# Patient Record
Sex: FEMALE | Race: WHITE | Employment: OTHER | URBAN - METROPOLITAN AREA
[De-identification: names, ages, dates, MRNs, and addresses within clinical notes are randomized per-mention and may not be internally consistent; named-entity substitution may affect disease eponyms.]

---

## 2020-09-01 VITALS
BODY MASS INDEX: 20.57 KG/M2 | HEIGHT: 66 IN | DIASTOLIC BLOOD PRESSURE: 64 MMHG | WEIGHT: 128 LBS | HEART RATE: 58 BPM | SYSTOLIC BLOOD PRESSURE: 113 MMHG

## 2020-09-01 DIAGNOSIS — I10 ESSENTIAL HYPERTENSION: ICD-10-CM

## 2020-09-01 DIAGNOSIS — Z95.0 CARDIAC PACEMAKER IN SITU: ICD-10-CM

## 2020-09-01 PROBLEM — K59.09 CHRONIC CONSTIPATION: Status: ACTIVE | Noted: 2018-10-22

## 2020-09-01 PROBLEM — I27.20 PULMONARY HYPERTENSION (HCC): Status: ACTIVE | Noted: 2018-09-04

## 2020-09-01 PROBLEM — I25.10 CORONARY ARTERIOSCLEROSIS: Status: ACTIVE | Noted: 2018-08-30

## 2020-09-01 PROBLEM — K57.90 DIVERTICULAR DISEASE: Status: ACTIVE | Noted: 2018-09-04

## 2020-09-01 RX ORDER — CARVEDILOL 3.12 MG/1
3.12 TABLET ORAL 2 TIMES DAILY WITH MEALS
COMMUNITY

## 2020-09-01 RX ORDER — FUROSEMIDE 20 MG/1
40 TABLET ORAL DAILY
COMMUNITY
End: 2021-10-06

## 2020-09-01 RX ORDER — AMLODIPINE BESYLATE 10 MG/1
TABLET ORAL DAILY
COMMUNITY
End: 2020-09-17

## 2020-09-17 ENCOUNTER — OFFICE VISIT (OUTPATIENT)
Dept: INTERNAL MEDICINE CLINIC | Age: 85
End: 2020-09-17
Payer: MEDICARE

## 2020-09-17 VITALS
OXYGEN SATURATION: 96 % | HEART RATE: 61 BPM | SYSTOLIC BLOOD PRESSURE: 116 MMHG | WEIGHT: 123.8 LBS | BODY MASS INDEX: 21.93 KG/M2 | DIASTOLIC BLOOD PRESSURE: 67 MMHG | TEMPERATURE: 97.6 F | RESPIRATION RATE: 14 BRPM | HEIGHT: 63 IN

## 2020-09-17 DIAGNOSIS — Z71.89 ADVANCED CARE PLANNING/COUNSELING DISCUSSION: ICD-10-CM

## 2020-09-17 DIAGNOSIS — K59.09 CHRONIC CONSTIPATION: ICD-10-CM

## 2020-09-17 DIAGNOSIS — I10 ESSENTIAL HYPERTENSION: Primary | ICD-10-CM

## 2020-09-17 DIAGNOSIS — E78.2 MIXED HYPERLIPIDEMIA: ICD-10-CM

## 2020-09-17 PROCEDURE — 3288F FALL RISK ASSESSMENT DOCD: CPT | Performed by: INTERNAL MEDICINE

## 2020-09-17 PROCEDURE — G8420 CALC BMI NORM PARAMETERS: HCPCS | Performed by: INTERNAL MEDICINE

## 2020-09-17 PROCEDURE — G8427 DOCREV CUR MEDS BY ELIG CLIN: HCPCS | Performed by: INTERNAL MEDICINE

## 2020-09-17 PROCEDURE — 1100F PTFALLS ASSESS-DOCD GE2>/YR: CPT | Performed by: INTERNAL MEDICINE

## 2020-09-17 PROCEDURE — G0444 DEPRESSION SCREEN ANNUAL: HCPCS | Performed by: INTERNAL MEDICINE

## 2020-09-17 PROCEDURE — G8510 SCR DEP NEG, NO PLAN REQD: HCPCS | Performed by: INTERNAL MEDICINE

## 2020-09-17 PROCEDURE — 99214 OFFICE O/P EST MOD 30 MIN: CPT | Performed by: INTERNAL MEDICINE

## 2020-09-17 PROCEDURE — 1090F PRES/ABSN URINE INCON ASSESS: CPT | Performed by: INTERNAL MEDICINE

## 2020-09-17 PROCEDURE — G8536 NO DOC ELDER MAL SCRN: HCPCS | Performed by: INTERNAL MEDICINE

## 2020-09-17 RX ORDER — PROMETHAZINE HYDROCHLORIDE 25 MG/1
25 TABLET ORAL
COMMUNITY
End: 2020-12-02 | Stop reason: SDUPTHER

## 2020-09-17 RX ORDER — LANOLIN ALCOHOL/MO/W.PET/CERES
1 CREAM (GRAM) TOPICAL
COMMUNITY
End: 2021-11-12

## 2020-09-17 RX ORDER — AMLODIPINE BESYLATE 5 MG/1
5 TABLET ORAL DAILY
COMMUNITY
Start: 2020-06-03 | End: 2021-01-25 | Stop reason: SDUPTHER

## 2020-09-17 RX ORDER — LANOLIN ALCOHOL/MO/W.PET/CERES
500 CREAM (GRAM) TOPICAL DAILY
COMMUNITY

## 2020-09-17 RX ORDER — BISMUTH SUBSALICYLATE 262 MG
1 TABLET,CHEWABLE ORAL DAILY
COMMUNITY

## 2020-09-17 RX ORDER — POLYETHYLENE GLYCOL 3350 17 G/17G
17 POWDER, FOR SOLUTION ORAL
COMMUNITY
End: 2021-11-12

## 2020-09-17 RX ORDER — FAMOTIDINE 20 MG/1
40 TABLET, FILM COATED ORAL 2 TIMES DAILY
COMMUNITY
Start: 2019-12-20 | End: 2021-01-25

## 2020-09-17 RX ORDER — SIMVASTATIN 20 MG/1
20 TABLET, FILM COATED ORAL
COMMUNITY
Start: 2020-06-03

## 2020-09-17 NOTE — PROGRESS NOTES
Omer Boone presents today for   Chief Complaint   Patient presents with    Physical    Hypotension     lightheaded       Depression Screening:  3 most recent PHQ Screens 9/17/2020   Little interest or pleasure in doing things Not at all   Feeling down, depressed, irritable, or hopeless Not at all   Total Score PHQ 2 0       Learning Assessment:  Learning Assessment 9/17/2020   PRIMARY LEARNER Patient   HIGHEST LEVEL OF EDUCATION - PRIMARY LEARNER  GRADUATED HIGH SCHOOL OR GED   PRIMARY LANGUAGE ENGLISH   LEARNER PREFERENCE PRIMARY READING   ANSWERED BY patient   RELATIONSHIP SELF       Abuse Screening:  Abuse Screening Questionnaire 9/17/2020   Do you ever feel afraid of your partner? N   Are you in a relationship with someone who physically or mentally threatens you? N   Is it safe for you to go home? Y       Fall Risk  Fall Risk Assessment, last 12 mths 9/17/2020   Able to walk? Yes   Fall in past 12 months? Yes   Fall with injury? No       ADL  ADL Assessment 9/17/2020   Feeding yourself No Help Needed   Getting from bed to chair No Help Needed   Getting dressed No Help Needed   Bathing or showering No Help Needed   Walk across the room (includes cane/walker) No Help Needed   Using the telphone No Help Needed   Taking your medications No Help Needed   Preparing meals No Help Needed   Managing money (expenses/bills) No Help Needed   Moderately strenuous housework (laundry) No Help Needed   Shopping for personal items (toiletries/medicines) No Help Needed   Shopping for groceries No Help Needed   Driving No Help Needed   Climbing a flight of stairs No Help Needed   Getting to places beyond walking distances No Help Needed         Health Maintenance reviewed and discussed and ordered per Provider.     Health Maintenance Due   Topic Date Due    Lipid Screen  07/31/1944    DTaP/Tdap/Td series (1 - Tdap) 07/31/1955    Shingrix Vaccine Age 50> (1 of 2) 07/31/1984    GLAUCOMA SCREENING Q2Y  07/31/1999    Bone Densitometry (Dexa) Screening  07/31/1999    Flu Vaccine (1) 09/01/2020   . Coordination of Care:  1. Have you been to the ER, urgent care clinic since your last visit? Hospitalized since your last visit? 8/28/20 costipation    2. Have you seen or consulted any other health care providers outside of the [address] [place] [address] since your last visit? Include any pap smears or colon screening. 9/7/20 right hand swelling.

## 2020-09-17 NOTE — PROGRESS NOTES
1. Essential hypertension  Pressures well controlled here. Will check a metabolic panel.  - METABOLIC PANEL, COMPREHENSIVE; Future    2. Chronic constipation  He is now having diarrhea from taking MiraLAX daily. I told her to cut her dose down by 50%. If she still has loose stools after that she is did change MiraLAX to every other day. 3. Mixed hyperlipidemia  Control unknown we will check a lipid panel continue statin  - LIPID PANEL; Future    4. Advanced care planning/counseling discussion  We spent about 8 minutes discussing advanced care planning. She is a DO NOT RESUSCITATE DO NOT INTUBATE. Her power of  is her daughter which is been documented in the ACP note  - DO NOT RESUSCITATE    5. Heart Failure  -Chronic condition and managed by . me see cardiology. Currently she appears euvolemic. She has chronic lower extremity edema. She did not take her Lasix dose this morning because she was coming to see me and did not want to have to use the bathroom. Patient was instructed to restart her Lasix immediately    Total time spent during this encounter was greater than 25 minutes more than 50% spent in counseling and coordination of care. This includes end-of-life discussion review of her medication lists and her past medical history and physical exam      Chief Complaint   Patient presents with    Physical    Hypotension     lightheaded        HPI   This is a delightful 44-year-old female with a history of cardiomyopathy and chronic congestive heart failure status post pacemaker placement. She has not seen a primary care doctor in quite some time but does have a cardiologist who is been managing her heart failure and her statin therapy. She reports she has been doing well but did have a visit to the hospital back in August.  She reports she has been doing well and taking her medications as prescribed. She has no chest pain shortness of breath no nausea vomiting or diarrhea.   She reports she does require home oxygen at night when she sleeps but otherwise can get around without oxygen. She reports she is wearing a mask pretty much at all times and otherwise is being very careful to avoid potential infection with COVID-19  Patient Active Problem List   Diagnosis Code    Allergic rhinitis J30.9    Cardiac pacemaker in situ Z95.0    Chronic constipation K59.09    Chronic obstructive lung disease ([facility] J44.9    Congestive heart failure (CHF) ([facility]) I50.9    Coronary arteriosclerosis I25.10    Diverticular disease K57.90    Essential hypertension I10    Gastroesophageal reflux disease K21.9    Hyperlipidemia E78.5    Myocardial infarction ([facility] I21.9    Osteopenia M85.80    Pulmonary hypertension ([facility]) I27.20        Current Outpatient Medications on File Prior to Visit   Medication Sig Dispense Refill    apixaban (ELIQUIS) 2.5 mg tablet Take 2.5 mg by mouth two (2) times a day.  famotidine (PEPCID) 20 mg tablet Take 40 mg by mouth two (2) times a day.  simvastatin (ZOCOR) 20 mg tablet Take 20 mg by mouth nightly.  amLODIPine (NORVASC) 5 mg tablet Take 5 mg by mouth daily.  linaCLOtide (LINZESS) 72 mcg cap capsule Take  by mouth Daily (before breakfast).  cyanocobalamin (VITAMIN B12) 500 mcg tablet Take 500 mcg by mouth daily.  calcium citrate-vitamin d3 (CITRACAL+D) 315 mg-5 mcg (200 unit) tab Take 1 Tab by mouth daily (with breakfast).  Omega-3 Fatty Acids 60- mg cpDR Take  by mouth.  multivitamin (ONE A DAY) tablet Take 1 Tab by mouth daily.  polyethylene glycol (MIRALAX) 17 gram/dose powder Take 17 g by mouth daily.  promethazine (PHENERGAN) 25 mg tablet Take 25 mg by mouth every six (6) hours as needed.  furosemide (LASIX) 20 mg tablet Take 20 mg by mouth daily.  carvediloL (COREG) 6.25 mg tablet Take 3.125 mg by mouth two (2) times daily (with meals).        No current facility-administered medications on file prior to visit.         ROS  - GEN: no weight gain/loss, no fevers or chills  - HEENT: no vision changes, no tinnitus, no sore throat  - CV: no cp, palpitations + edema  - RESP: no sob, cough  - ABD: no n/v/d, no blood in stool  - : no dysuria or changes in freq. - SKIN: no rashes, ulcers  - Neuro: no resting tremors, parasthesia in extremities, no headaches  - MS: No weakness in extremities, no gait abnormalities  - Psych: negative for depression or anxiety      Visit Vitals  /67 (BP 1 Location: Left arm, BP Patient Position: Sitting)   Pulse 61   Temp 97.6 °F (36.4 °C) (Temporal)   Resp 14   Ht 5' 3\" (1.6 m)   Wt 123 lb 12.8 oz (56.2 kg)   SpO2 96%   BMI 21.93 kg/m²           Physical Exam  Constitutional:       Appearance: Normal appearance. Normal weight. NAD and pleasant  HENT:      Head: Normocephalic. Nose: Nose normal.      Mouth/Throat:      Mouth: Mucous membranes are moist. Throat not inflammed  Eyes:      Extraocular Movements: Extraocular movements intact. Conjunctiva/sclera: Conjunctivae normal. Sclera anicteric     Pupils: Pupils are equal, round, and reactive to light. Cardiovascular:      Rate and Rhythm: some ectopy noted, pacer/defib in place on left. Pulses: Normal pulses. Pulmonary:      Effort: No respiratory distress. Breath sounds: CTAB and No stridor. No rhonchi. Abdominal:      General: There is no distension. NT, ND  Neurological:      Mental Status: patient is alert and oriented times 3.  No resting tremor, normal gait     Cranial Nerves: cranial nerves grossly intact  Muskuloskeletal     Full ROM in extremities     Normal gait  Skin     Dry without lesions on examined areas, warm to the touch       Deferred  Psychiatry     Calm, normal affect, interacting normally

## 2020-10-26 ENCOUNTER — HOSPITAL ENCOUNTER (OUTPATIENT)
Dept: LAB | Age: 85
Discharge: HOME | End: 2020-10-26
Payer: MEDICARE

## 2020-10-26 DIAGNOSIS — I10 ESSENTIAL HYPERTENSION: ICD-10-CM

## 2020-10-26 DIAGNOSIS — E78.2 MIXED HYPERLIPIDEMIA: ICD-10-CM

## 2020-10-26 LAB
ALBUMIN SERPL-MCNC: 4.4 G/DL (ref 3.5–4.7)
ALBUMIN/GLOB SERPL: 1.3 {RATIO}
ALP SERPL-CCNC: 66 U/L (ref 38–126)
ALT SERPL-CCNC: 16 U/L (ref 3–52)
ANION GAP SERPL CALC-SCNC: 10 MMOL/L
AST SERPL W P-5'-P-CCNC: 24 U/L (ref 14–74)
BILIRUB SERPL-MCNC: 0.7 MG/DL (ref 0.2–1)
BUN SERPL-MCNC: 14 MG/DL (ref 9–21)
BUN/CREAT SERPL: 20
CA-I BLD-MCNC: 9.9 MG/DL (ref 8.5–10.5)
CHLORIDE SERPL-SCNC: 107 MMOL/L (ref 94–111)
CO2 SERPL-SCNC: 26 MMOL/L (ref 21–33)
CREAT SERPL-MCNC: 0.7 MG/DL (ref 0.7–1.2)
GLOBULIN SER CALC-MCNC: 3.5 G/DL
GLUCOSE SERPL-MCNC: 84 MG/DL (ref 70–110)
POTASSIUM SERPL-SCNC: 3.9 MMOL/L (ref 3.2–5.1)
PROT SERPL-MCNC: 7.9 G/DL (ref 6.1–8.4)
SODIUM SERPL-SCNC: 143 MMOL/L (ref 135–145)

## 2020-10-26 PROCEDURE — 80061 LIPID PANEL: CPT

## 2020-10-26 PROCEDURE — 36415 COLL VENOUS BLD VENIPUNCTURE: CPT

## 2020-10-26 PROCEDURE — 80053 COMPREHEN METABOLIC PANEL: CPT

## 2020-10-27 LAB
CHOLEST SERPL-MCNC: 156 MG/DL
HDLC SERPL-MCNC: 73 MG/DL
HDLC SERPL: 2.1 {RATIO} (ref 0–5)
LDLC SERPL CALC-MCNC: 67 MG/DL (ref 0–100)
LIPID PROFILE,FLP: NORMAL
TRIGL SERPL-MCNC: 80 MG/DL (ref ?–150)
VLDLC SERPL CALC-MCNC: 16 MG/DL

## 2020-11-05 RX ORDER — LEVALBUTEROL TARTRATE 45 UG/1
90 AEROSOL, METERED ORAL
COMMUNITY
End: 2020-11-05 | Stop reason: SDUPTHER

## 2020-11-05 RX ORDER — LEVALBUTEROL TARTRATE 45 UG/1
AEROSOL, METERED ORAL
Qty: 1 INHALER | Refills: 3 | Status: SHIPPED | OUTPATIENT
Start: 2020-11-05 | End: 2021-01-25

## 2020-11-05 NOTE — TELEPHONE ENCOUNTER
Fax from pharmacy requesting refill for patient. Verbal order given by Dr. Peace Mcpherson and RX sent as requested.   Sharan Euceda LPN

## 2020-12-02 RX ORDER — PROMETHAZINE HYDROCHLORIDE 25 MG/1
25 TABLET ORAL
Qty: 20 TAB | Refills: 1 | Status: SHIPPED | OUTPATIENT
Start: 2020-12-02 | End: 2021-01-25

## 2021-01-25 ENCOUNTER — VIRTUAL VISIT (OUTPATIENT)
Dept: INTERNAL MEDICINE CLINIC | Age: 86
End: 2021-01-25
Payer: MEDICARE

## 2021-01-25 DIAGNOSIS — E78.2 MIXED HYPERLIPIDEMIA: Primary | ICD-10-CM

## 2021-01-25 DIAGNOSIS — Z13.31 SCREENING FOR DEPRESSION: ICD-10-CM

## 2021-01-25 DIAGNOSIS — Z00.00 MEDICARE ANNUAL WELLNESS VISIT, SUBSEQUENT: ICD-10-CM

## 2021-01-25 DIAGNOSIS — M62.838 NIGHT MUSCLE SPASMS: ICD-10-CM

## 2021-01-25 PROCEDURE — 1100F PTFALLS ASSESS-DOCD GE2>/YR: CPT | Performed by: INTERNAL MEDICINE

## 2021-01-25 PROCEDURE — G8536 NO DOC ELDER MAL SCRN: HCPCS | Performed by: INTERNAL MEDICINE

## 2021-01-25 PROCEDURE — G8420 CALC BMI NORM PARAMETERS: HCPCS | Performed by: INTERNAL MEDICINE

## 2021-01-25 PROCEDURE — G0439 PPPS, SUBSEQ VISIT: HCPCS | Performed by: INTERNAL MEDICINE

## 2021-01-25 PROCEDURE — G8510 SCR DEP NEG, NO PLAN REQD: HCPCS | Performed by: INTERNAL MEDICINE

## 2021-01-25 PROCEDURE — G8427 DOCREV CUR MEDS BY ELIG CLIN: HCPCS | Performed by: INTERNAL MEDICINE

## 2021-01-25 PROCEDURE — 3288F FALL RISK ASSESSMENT DOCD: CPT | Performed by: INTERNAL MEDICINE

## 2021-01-25 RX ORDER — CETIRIZINE HYDROCHLORIDE 5 MG/1
5 TABLET ORAL DAILY
COMMUNITY

## 2021-01-25 RX ORDER — ARFORMOTEROL TARTRATE 15 UG/2ML
15 SOLUTION RESPIRATORY (INHALATION)
COMMUNITY

## 2021-01-25 RX ORDER — BUDESONIDE 0.5 MG/2ML
500 INHALANT ORAL
COMMUNITY
End: 2021-03-17

## 2021-01-25 RX ORDER — DENOSUMAB 60 MG/ML
60 INJECTION SUBCUTANEOUS
COMMUNITY
End: 2021-09-14

## 2021-01-25 RX ORDER — METHOCARBAMOL 500 MG/1
500 TABLET, FILM COATED ORAL 4 TIMES DAILY
COMMUNITY
End: 2021-01-25 | Stop reason: SDUPTHER

## 2021-01-25 RX ORDER — LEVOCETIRIZINE DIHYDROCHLORIDE 5 MG/1
5 TABLET, FILM COATED ORAL DAILY
COMMUNITY

## 2021-01-25 RX ORDER — AMLODIPINE BESYLATE 5 MG/1
5 TABLET ORAL DAILY
Qty: 90 TAB | Refills: 3 | Status: SHIPPED | OUTPATIENT
Start: 2021-01-25 | End: 2021-09-14

## 2021-01-25 RX ORDER — METHOCARBAMOL 500 MG/1
500 TABLET, FILM COATED ORAL 4 TIMES DAILY
Qty: 360 TAB | Refills: 3 | Status: SHIPPED | OUTPATIENT
Start: 2021-01-25

## 2021-01-25 RX ORDER — MONTELUKAST SODIUM 10 MG/1
10 TABLET ORAL DAILY
COMMUNITY
End: 2021-09-06

## 2021-01-25 RX ORDER — TRAMADOL HYDROCHLORIDE 50 MG/1
50 TABLET ORAL
COMMUNITY
End: 2021-03-17

## 2021-01-25 RX ORDER — LANOLIN ALCOHOL/MO/W.PET/CERES
325 CREAM (GRAM) TOPICAL
COMMUNITY
End: 2021-03-17

## 2021-01-25 NOTE — PATIENT INSTRUCTIONS
Medicare Wellness Visit, Female The best way to live healthy is to have a lifestyle where you eat a well-balanced diet, exercise regularly, limit alcohol use, and quit all forms of tobacco/nicotine, if applicable. Regular preventive services are another way to keep healthy. Preventive services (vaccines, screening tests, monitoring & exams) can help personalize your care plan, which helps you manage your own care. Screening tests can find health problems at the earliest stages, when they are easiest to treat. [facility] follows the current, evidence-based guidelines published by the [facility] ([facility]) when recommending preventive services for our patients. Because we follow these guidelines, sometimes recommendations change over time as research supports it. (For example, mammograms used to be recommended annually. Even though Medicare will still pay for an annual mammogram, the newer guidelines recommend a mammogram every two years for women of average risk). Of course, you and your doctor may decide to screen more often for some diseases, based on your risk and your co-morbidities (chronic disease you are already diagnosed with). Preventive services for you include: - Medicare offers their members a free annual wellness visit, which is time for you and your primary care provider to discuss and plan for your preventive service needs. Take advantage of this benefit every year! 
-All adults over the age of 72 should receive the recommended pneumonia vaccines. Current USPSTF guidelines recommend a series of two vaccines for the best pneumonia protection.  
-All adults should have a flu vaccine yearly and a tetanus vaccine every 10 years.  
-All adults age 48 and older should receive the shingles vaccines (series of two vaccines). -All adults age 38-68 who are overweight should have a diabetes screening test once every three years. -All adults born between [birth year] and 1965 should be screened once for Hepatitis C. 
-Other screening tests and preventive services for persons with diabetes include: an eye exam to screen for diabetic retinopathy, a kidney function test, a foot exam, and stricter control over your cholesterol.  
-Cardiovascular screening for adults with routine risk involves an electrocardiogram (ECG) at intervals determined by your doctor.  
-Colorectal cancer screenings should be done for adults age 54-65 with no increased risk factors for colorectal cancer. There are a number of acceptable methods of screening for this type of cancer. Each test has its own benefits and drawbacks. Discuss with your doctor what is most appropriate for you during your annual wellness visit. The different tests include: colonoscopy (considered the best screening method), a fecal occult blood test, a fecal DNA test, and sigmoidoscopy. 
 
-A bone mass density test is recommended when a woman turns 65 to screen for osteoporosis. This test is only recommended one time, as a screening. Some providers will use this same test as a disease monitoring tool if you already have osteoporosis. -Breast cancer screenings are recommended every other year for women of normal risk, age 54-69. 
-Cervical cancer screenings for women over age 72 are only recommended with certain risk factors. Here is a list of your current Health Maintenance items (your personalized list of preventive services) with a due date: 
Health Maintenance Due Topic Date Due  
 COVID-19 Vaccine (1 of 2) 07/31/1950  DTaP/Tdap/Td  (1 - Tdap) 07/31/1955  Shingles Vaccine (1 of 2) 07/31/1984  Glaucoma Screening   07/31/1999  Bone Mineral Density   07/31/1999  Yearly Flu Vaccine (1) 09/01/2020

## 2021-01-25 NOTE — PROGRESS NOTES
Wants to see GI and Pulmonary. This is the Subsequent Medicare Annual Wellness Exam, performed 12 months or more after the Initial AWV or the last Subsequent AWV    I have reviewed the patient's medical history in detail and updated the computerized patient record. Depression Risk Factor Screening:     3 most recent PHQ Screens 9/17/2020   Little interest or pleasure in doing things Not at all   Feeling down, depressed, irritable, or hopeless Not at all   Total Score PHQ 2 0       Alcohol Risk Screen    Do you average more than 1 drink per night or more than 7 drinks a week:  No    On any one occasion in the past three months have you have had more than 3 drinks containing alcohol:  No        Functional Ability and Level of Safety:    Hearing: Hearing is good. Activities of Daily Living: The home contains: grab bars  Patient does total self care      Ambulation: with mild difficulty     Fall Risk:  Fall Risk Assessment, last 12 mths 9/17/2020   Able to walk? Yes   Fall in past 12 months? Yes   Fall with injury? 0      Abuse Screen:  Patient is not abused       Cognitive Screening    Has your family/caregiver stated any concerns about your memory: no    Cognitive Screening: Normal - Mini Cog Test Patient repeated back to me 2 out of 3 words she had to memorize banana and chair. Could not remember sunrise    Assessment/Plan   Education and counseling provided:  Are appropriate based on today's review and evaluation    Diagnoses and all orders for this visit:    1. Medicare annual wellness visit, subsequent  -     [address]    2. Screening for depression  -     DEPRESSION SCREEN ANNUAL    3. Tobacco  - quit smoking over 20 years ago. She does not want a screening ct.     Health Maintenance Due     Health Maintenance Due   Topic Date Due    COVID-19 Vaccine (1 of 2) 07/31/1950    DTaP/Tdap/Td series (1 - Tdap) 07/31/1955    Shingrix Vaccine Age 50> (1 of 2) 07/31/1984    GLAUCOMA SCREENING Q2Y  07/31/1999    Bone Densitometry (Dexa) Screening  07/31/1999    Flu Vaccine (1) 09/01/2020       Patient Care Team   Patient Care Team:  Loretta Schuster MD as PCP - General (Internal Medicine)  Loretta Schuster MD as PCP - [address] [place] [address] Dr Gifford Provider    History     Patient Active Problem List   Diagnosis Code    Allergic rhinitis J30.9    Cardiac pacemaker in situ Z95.0    Chronic constipation K59.09    Chronic obstructive lung disease ([address] J44.9    Congestive heart failure (CHF) ([facility]) I50.9    Coronary arteriosclerosis I25.10    Diverticular disease K57.90    Essential hypertension I10    Gastroesophageal reflux disease K21.9    Hyperlipidemia E78.5    Myocardial infarction ([address] I21.9    Osteopenia M85.80    Pulmonary hypertension ([address] I27.20     Past Medical History:   Diagnosis Date    Arthritis     Asthma     CAD (coronary artery disease)     Heart disease     High cholesterol     Hyperlipidemia       Past Surgical History:   Procedure Laterality Date    CARDIAC SURG PROCEDURE UNLIST  2007    defibrillator placed    HX APPENDECTOMY      HX COLONOSCOPY  01/26/2015    per gi no further colonoscopy needed due to age of the patient, see note 1/26/15    HX HERNIA REPAIR Left 2001    inguinal    HX HYSTERECTOMY       Current Outpatient Medications   Medication Sig Dispense Refill    promethazine (PHENERGAN) 25 mg tablet Take 1 Tab by mouth every twelve (12) hours as needed for Nausea. 20 Tab 1    levalbuterol tartrate (XOPENEX) 45 mcg/actuation inhaler Inhale 2 puffs every 4-6 hours as needed 1 Inhaler 3    apixaban (ELIQUIS) 2.5 mg tablet Take 2.5 mg by mouth two (2) times a day.  famotidine (PEPCID) 20 mg tablet Take 40 mg by mouth two (2) times a day.  simvastatin (ZOCOR) 20 mg tablet Take 20 mg by mouth nightly.  amLODIPine (NORVASC) 5 mg tablet Take 5 mg by mouth daily.       linaCLOtide (LINZESS) 72 mcg cap capsule Take  by mouth Daily (before breakfast).  cyanocobalamin (VITAMIN B12) 500 mcg tablet Take 500 mcg by mouth daily.  calcium citrate-vitamin d3 (CITRACAL+D) 315 mg-5 mcg (200 unit) tab Take 1 Tab by mouth daily (with breakfast).  Omega-3 Fatty Acids 60- mg cpDR Take  by mouth.  multivitamin (ONE A DAY) tablet Take 1 Tab by mouth daily.  polyethylene glycol (MIRALAX) 17 gram/dose powder Take 17 g by mouth daily.  furosemide (LASIX) 20 mg tablet Take 20 mg by mouth daily.  carvediloL (COREG) 6.25 mg tablet Take 3.125 mg by mouth two (2) times daily (with meals). Allergies   Allergen Reactions    Codeine Nausea and Vomiting       Family History   Problem Relation Age of Onset    Emphysema Mother      Social History     Tobacco Use    Smoking status: Former Smoker    Smokeless tobacco: Never Used   Substance Use Topics    Alcohol use: Not Currently       Margarito Lagunas, who was evaluated through a synchronous (real-time) audio only encounter, and/or her healthcare decision maker, is aware that it is a billable service, with coverage as determined by her insurance carrier. She provided verbal consent to proceed: Yes, and patient identification was verified. It was conducted pursuant to the emergency declaration under the [facility], [address] [place] [address] authority and the [employer] Act. A caregiver was present when appropriate. Ability to conduct physical exam was limited. I was at home. The patient was at home.     Kimber Gutierrez LPN

## 2021-03-02 RX ORDER — LINACLOTIDE 72 UG/1
CAPSULE, GELATIN COATED ORAL
Qty: 90 CAP | Refills: 0 | Status: SHIPPED | OUTPATIENT
Start: 2021-03-02

## 2021-03-17 ENCOUNTER — OFFICE VISIT (OUTPATIENT)
Dept: INTERNAL MEDICINE CLINIC | Age: 86
End: 2021-03-17
Payer: MEDICARE

## 2021-03-17 VITALS
RESPIRATION RATE: 20 BRPM | BODY MASS INDEX: 23.42 KG/M2 | TEMPERATURE: 96.7 F | SYSTOLIC BLOOD PRESSURE: 136 MMHG | HEART RATE: 65 BPM | HEIGHT: 63 IN | WEIGHT: 132.2 LBS | OXYGEN SATURATION: 96 % | DIASTOLIC BLOOD PRESSURE: 73 MMHG

## 2021-03-17 DIAGNOSIS — I48.0 PAROXYSMAL ATRIAL FIBRILLATION (HCC): ICD-10-CM

## 2021-03-17 DIAGNOSIS — R60.9 EDEMA, UNSPECIFIED TYPE: ICD-10-CM

## 2021-03-17 DIAGNOSIS — Z79.01 CHRONIC ANTICOAGULATION: ICD-10-CM

## 2021-03-17 DIAGNOSIS — I10 ESSENTIAL HYPERTENSION: ICD-10-CM

## 2021-03-17 DIAGNOSIS — I42.8 NONISCHEMIC CARDIOMYOPATHY (HCC): ICD-10-CM

## 2021-03-17 DIAGNOSIS — E78.2 MIXED HYPERLIPIDEMIA: ICD-10-CM

## 2021-03-17 DIAGNOSIS — R25.2 LEG CRAMPS: Primary | ICD-10-CM

## 2021-03-17 DIAGNOSIS — J44.9 CHRONIC OBSTRUCTIVE PULMONARY DISEASE, UNSPECIFIED COPD TYPE (HCC): ICD-10-CM

## 2021-03-17 PROCEDURE — G8420 CALC BMI NORM PARAMETERS: HCPCS | Performed by: INTERNAL MEDICINE

## 2021-03-17 PROCEDURE — G8536 NO DOC ELDER MAL SCRN: HCPCS | Performed by: INTERNAL MEDICINE

## 2021-03-17 PROCEDURE — G8427 DOCREV CUR MEDS BY ELIG CLIN: HCPCS | Performed by: INTERNAL MEDICINE

## 2021-03-17 PROCEDURE — 1100F PTFALLS ASSESS-DOCD GE2>/YR: CPT | Performed by: INTERNAL MEDICINE

## 2021-03-17 PROCEDURE — 99214 OFFICE O/P EST MOD 30 MIN: CPT | Performed by: INTERNAL MEDICINE

## 2021-03-17 PROCEDURE — 1090F PRES/ABSN URINE INCON ASSESS: CPT | Performed by: INTERNAL MEDICINE

## 2021-03-17 PROCEDURE — 3288F FALL RISK ASSESSMENT DOCD: CPT | Performed by: INTERNAL MEDICINE

## 2021-03-17 PROCEDURE — G8510 SCR DEP NEG, NO PLAN REQD: HCPCS | Performed by: INTERNAL MEDICINE

## 2021-03-17 RX ORDER — LISINOPRIL 20 MG/1
20 TABLET ORAL DAILY
COMMUNITY
End: 2021-10-06

## 2021-03-17 RX ORDER — FAMOTIDINE 20 MG/1
20 TABLET, FILM COATED ORAL 2 TIMES DAILY
COMMUNITY
End: 2021-10-08 | Stop reason: SDUPTHER

## 2021-03-17 NOTE — PROGRESS NOTES
Bilateral legs swelling and bad leg cramps at night. Jone Bender presents today for   Chief Complaint   Patient presents with    Peripheral Edema     bilateral lower extremity       Is someone accompanying this pt? no    Is the patient using any DME equipment during OV? no    Depression Screening:  3 most recent PHQ Screens 3/17/2021   Little interest or pleasure in doing things Not at all   Feeling down, depressed, irritable, or hopeless Not at all   Total Score PHQ 2 0       Learning Assessment:  Learning Assessment 1/25/2021   PRIMARY LEARNER Patient   HIGHEST LEVEL OF EDUCATION - PRIMARY LEARNER  GRADUATED HIGH SCHOOL OR GED   BARRIERS PRIMARY LEARNER NONE   PRIMARY LANGUAGE ENGLISH   LEARNER PREFERENCE PRIMARY READING   ANSWERED BY patient   RELATIONSHIP SELF       Fall Risk  Fall Risk Assessment, last 12 mths 3/17/2021   Able to walk? Yes   Fall in past 12 months? 0   Do you feel unsteady? 0   Are you worried about falling 0   Fall with injury? -       ADL  ADL Assessment 3/17/2021   Feeding yourself No Help Needed   Getting from bed to chair No Help Needed   Getting dressed No Help Needed   Bathing or showering No Help Needed   Walk across the room (includes cane/walker) No Help Needed   Using the telphone No Help Needed   Taking your medications No Help Needed   Preparing meals No Help Needed   Managing money (expenses/bills) No Help Needed   Moderately strenuous housework (laundry) No Help Needed   Shopping for personal items (toiletries/medicines) No Help Needed   Shopping for groceries No Help Needed   Driving No Help Needed   Climbing a flight of stairs No Help Needed   Getting to places beyond walking distances No Help Needed       Health Maintenance reviewed and discussed and ordered per Provider.     Health Maintenance Due   Topic Date Due    DTaP/Tdap/Td series (1 - Tdap) Never done    Shingrix Vaccine Age 50> (1 of 2) Never done    GLAUCOMA SCREENING Q2Y  Never done    Bone Densitometry (Dexa) Screening  Never done    Flu Vaccine (1) 09/01/2020   . Coordination of Care:  1. Have you been to the ER, urgent care clinic since your last visit? Hospitalized since your last visit? no    2. Have you seen or consulted any other health care providers outside of the [address] [place] [address] since your last visit? Include any pap smears or colon screening.  no

## 2021-03-17 NOTE — PROGRESS NOTES
1. Chronic obstructive pulmonary disease, unspecified COPD type ([facility] [address]  New current treatment. To my knowledge she has not had pulmonary function tests done. She does not want to travel further than [place].  - REFERRAL TO PULMONARY DISEASE    2. Nonischemic cardiomyopathy (HCC)  Stable. She does have edema in her bilateral lower extremities. I am going to check a CMP    3. Paroxysmal atrial fibrillation (HCC)  She is in normal sinus rhythm on my exam.  Continue anticoagulation    4. Leg cramps  We will check a magnesium level and a BMP  - MAGNESIUM    5. Essential hypertension  We will check a metabolic panel blood pressure is controlled today  - METABOLIC PANEL, COMPREHENSIVE    6. Mixed hyperlipidemia  She is due for lipid panel will check. Continue statin  - LIPID PANEL; Future    7. Chronic anticoagulation  CBC  - CBC W/O DIFF    8. Edema  This is a new problem. Increase Lasix to 40 mg daily. We are checking a CMP. We will plan to closely follow-up in a few weeks. Chief Complaint   Patient presents with    Peripheral Edema     bilateral lower extremity        HPI   This is a very pleasant 80-year-old female with a history of advanced COPD requiring 2 L of oxygen ischemic cardiomyopathy and generalized debility. She presents today complaining of a slight increase in shortness of breath over the past several months. She has an appointment with her cardiologist next month. She also reports she has been having slight we worse coughing over the past few months people. She has not seen a pulmonary physician in quite some time. She is also complaining of increased bilateral lower extremity edema. It goes up to the shins and is on both her left and right. She denies any dizziness chest pain or chest pressure. She has no nausea or vomiting. She reports elevating her legs really does not help with the edema.   Patient Active Problem List   Diagnosis Code    Allergic rhinitis J30.9    Cardiac pacemaker in situ Z95.0    Chronic constipation K59.09    Chronic obstructive lung disease ([facility]) J44.9    Congestive heart failure (CHF) ([facility]) I50.9    Coronary arteriosclerosis I25.10    Diverticular disease K57.90    Essential hypertension I10    Gastroesophageal reflux disease K21.9    Hyperlipidemia E78.5    Myocardial infarction ([facility]) I21.9    Osteopenia M85.80    Pulmonary hypertension ([facility]) I27.20        Current Outpatient Medications on File Prior to Visit   Medication Sig Dispense Refill    lisinopriL (PRINIVIL, ZESTRIL) 20 mg tablet Take 20 mg by mouth daily.  famotidine (PEPCID) 20 mg tablet Take 20 mg by mouth two (2) times a day.  Linzess 72 mcg cap capsule Take 1 capsule by mouth once daily for 90 days 90 Cap 0    albuterol sulfate 90 mcg/actuation aebs Take 90 mcg by inhalation daily.  arformoteroL (BROVANA) 15 mcg/2 mL nebu neb solution 15 mcg by Nebulization route.  cetirizine (ZYRTEC) 5 mg tablet Take 5 mg by mouth daily.  fluticasone propionate (FLONASE ALLERGY RELIEF NA) 1 [place] by Nasal route.  montelukast (Singulair) 10 mg tablet Take 10 mg by mouth daily.  denosumab (Prolia) 60 mg/mL injection 60 mg by SubCUTAneous route.  levocetirizine (XYZAL) 5 mg tablet Take 5 mg by mouth daily.  Oxygen 2 Liters at night      amLODIPine (NORVASC) 5 mg tablet Take 1 Tab by mouth daily. 90 Tab 3    methocarbamoL (ROBAXIN) 500 mg tablet Take 1 Tab by mouth four (4) times daily. 360 Tab 3    apixaban (ELIQUIS) 2.5 mg tablet Take 2.5 mg by mouth two (2) times a day.  simvastatin (ZOCOR) 20 mg tablet Take 20 mg by mouth nightly.  cyanocobalamin (VITAMIN B12) 500 mcg tablet Take 500 mcg by mouth daily.  calcium citrate-vitamin d3 (CITRACAL+D) 315 mg-5 mcg (200 unit) tab Take 1 Tab by mouth daily (with breakfast).  Omega-3 Fatty Acids 60- mg cpDR Take  by mouth.  multivitamin (ONE A DAY) tablet Take 1 Tab by mouth daily.  polyethylene glycol (MIRALAX) 17 gram/dose powder Take 17 g by mouth daily.  furosemide (LASIX) 20 mg tablet Take 20 mg by mouth daily.  carvediloL (COREG) 6.25 mg tablet Take 3.125 mg by mouth two (2) times daily (with meals). No current facility-administered medications on file prior to visit. ROS  - GEN: no weight gain/loss, no fevers or chills  - HEENT: no vision changes, no tinnitus, no sore throat  - CV: no cp, palpitations + edema  - RESP: + sob, +cough  - ABD: no n/v/d, no blood in stool  - : no dysuria or changes in freq. - SKIN: no rashes, ulcers  - Neuro: no resting tremors, parasthesia in extremities, no headaches  - MS: No weakness in extremities, no gait abnormalities  - Psych: negative for depression or anxiety      Visit Vitals  /73   Pulse 65   Temp (!) 96.7 °F (35.9 °C)   Resp 20   Ht 5' 3\" (1.6 m)   Wt 132 lb 3.2 oz (60 kg)   SpO2 96%   BMI 23.42 kg/m²           Physical Exam  Constitutional:       Appearance: Normal appearance. Normal weight. NAD and pleasant  HENT:      Head: Normocephalic. Nose: Nose normal.      Mouth/Throat:      Mouth: Mucous membranes are moist. Throat not inflammed  Eyes:      Extraocular Movements: Extraocular movements intact. Conjunctiva/sclera: Conjunctivae normal. Sclera anicteric     Pupils: Pupils are equal, round, and reactive to light. Cardiovascular:      Rate and Rhythm: Normal rate and regular rhythm. 2/6 sm lsb +1 bilateral pedal edema bilaterally no cords noted, negative irma's     Pulses: Normal pulses. Pulmonary:      Effort: No respiratory distress. Breath sounds: diminished in all lung fields   Abdominal:      General: There is no distension. NT, ND  Neurological:      Mental Status: patient is alert and oriented times 3.  No resting tremor, abnormal gait     Cranial Nerves: cranial nerves grossly intact  Muskuloskeletal     Full ROM in extremities     abnml gait  Skin     Dry without lesions on examined areas, warm to the touch       Deferred  Psychiatry     Calm, normal affect, interacting normally

## 2021-03-18 ENCOUNTER — HOSPITAL ENCOUNTER (OUTPATIENT)
Dept: LAB | Age: 86
Discharge: HOME | End: 2021-03-18
Payer: MEDICARE

## 2021-03-18 DIAGNOSIS — E78.2 MIXED HYPERLIPIDEMIA: ICD-10-CM

## 2021-03-18 LAB
CHOLEST SERPL-MCNC: 147 MG/DL
HDLC SERPL-MCNC: 82 MG/DL
HDLC SERPL: 1.8 {RATIO} (ref 0–5)
LDLC SERPL CALC-MCNC: 45.6 MG/DL (ref 0–100)
LIPID PROFILE,FLP: NORMAL
TRIGL SERPL-MCNC: 97 MG/DL (ref ?–150)
VLDLC SERPL CALC-MCNC: 19.4 MG/DL

## 2021-03-18 PROCEDURE — 36415 COLL VENOUS BLD VENIPUNCTURE: CPT

## 2021-03-18 PROCEDURE — 80061 LIPID PANEL: CPT

## 2021-04-19 ENCOUNTER — OFFICE VISIT (OUTPATIENT)
Dept: INTERNAL MEDICINE CLINIC | Age: 86
End: 2021-04-19
Payer: MEDICARE

## 2021-04-19 VITALS
OXYGEN SATURATION: 95 % | TEMPERATURE: 98.2 F | HEART RATE: 81 BPM | SYSTOLIC BLOOD PRESSURE: 121 MMHG | DIASTOLIC BLOOD PRESSURE: 72 MMHG | BODY MASS INDEX: 23.39 KG/M2 | RESPIRATION RATE: 22 BRPM | WEIGHT: 132 LBS | HEIGHT: 63 IN

## 2021-04-19 DIAGNOSIS — M62.838 TRAPEZIUS MUSCLE SPASM: ICD-10-CM

## 2021-04-19 DIAGNOSIS — R11.0 NAUSEA: ICD-10-CM

## 2021-04-19 DIAGNOSIS — J44.9 CHRONIC OBSTRUCTIVE PULMONARY DISEASE, UNSPECIFIED COPD TYPE (HCC): Primary | ICD-10-CM

## 2021-04-19 PROCEDURE — G8427 DOCREV CUR MEDS BY ELIG CLIN: HCPCS | Performed by: INTERNAL MEDICINE

## 2021-04-19 PROCEDURE — 99214 OFFICE O/P EST MOD 30 MIN: CPT | Performed by: INTERNAL MEDICINE

## 2021-04-19 PROCEDURE — 1100F PTFALLS ASSESS-DOCD GE2>/YR: CPT | Performed by: INTERNAL MEDICINE

## 2021-04-19 PROCEDURE — 3288F FALL RISK ASSESSMENT DOCD: CPT | Performed by: INTERNAL MEDICINE

## 2021-04-19 PROCEDURE — G8510 SCR DEP NEG, NO PLAN REQD: HCPCS | Performed by: INTERNAL MEDICINE

## 2021-04-19 PROCEDURE — G8420 CALC BMI NORM PARAMETERS: HCPCS | Performed by: INTERNAL MEDICINE

## 2021-04-19 PROCEDURE — G8536 NO DOC ELDER MAL SCRN: HCPCS | Performed by: INTERNAL MEDICINE

## 2021-04-19 PROCEDURE — 1090F PRES/ABSN URINE INCON ASSESS: CPT | Performed by: INTERNAL MEDICINE

## 2021-04-19 RX ORDER — PROMETHAZINE HYDROCHLORIDE 25 MG/1
25 TABLET ORAL
Qty: 60 TAB | Refills: 3 | Status: SHIPPED | OUTPATIENT
Start: 2021-04-19 | End: 2021-11-03 | Stop reason: DRUGHIGH

## 2021-04-19 RX ORDER — LIDOCAINE 50 MG/G
PATCH TOPICAL
Qty: 30 EACH | Refills: 2 | Status: SHIPPED | OUTPATIENT
Start: 2021-04-19

## 2021-04-19 NOTE — PROGRESS NOTES
SOB worsening and left shoulder pain. Patient may possibly need her oxygen around the clock. Patient oxygen level assessed with ambulating around office and 96%      Theresa Pagan presents today for   Chief Complaint   Patient presents with    Follow-up     4 week       Is someone accompanying this pt? no  Is the patient using any DME equipment during OV? no    Depression Screening:  3 most recent PHQ Screens 4/19/2021   Little interest or pleasure in doing things Not at all   Feeling down, depressed, irritable, or hopeless Not at all   Total Score PHQ 2 0       Learning Assessment:  Learning Assessment 1/25/2021   PRIMARY LEARNER Patient   HIGHEST LEVEL OF EDUCATION - PRIMARY LEARNER  GRADUATED HIGH SCHOOL OR GED   BARRIERS PRIMARY LEARNER NONE   PRIMARY LANGUAGE ENGLISH   LEARNER PREFERENCE PRIMARY READING   ANSWERED BY patient   RELATIONSHIP SELF       Fall Risk  Fall Risk Assessment, last 12 mths 4/19/2021   Able to walk? Yes   Fall in past 12 months? 0   Do you feel unsteady? 0   Are you worried about falling 0   Fall with injury? -       ADL  ADL Assessment 3/17/2021   Feeding yourself No Help Needed   Getting from bed to chair No Help Needed   Getting dressed No Help Needed   Bathing or showering No Help Needed   Walk across the room (includes cane/walker) No Help Needed   Using the telphone No Help Needed   Taking your medications No Help Needed   Preparing meals No Help Needed   Managing money (expenses/bills) No Help Needed   Moderately strenuous housework (laundry) No Help Needed   Shopping for personal items (toiletries/medicines) No Help Needed   Shopping for groceries No Help Needed   Driving No Help Needed   Climbing a flight of stairs No Help Needed   Getting to places beyond walking distances No Help Needed       Health Maintenance reviewed and discussed and ordered per Provider.     Health Maintenance Due   Topic Date Due    DTaP/Tdap/Td series (1 - Tdap) Never done    Shingrix Vaccine Age 50> (1 of 2) Never done    Bone Densitometry (Dexa) Screening  Never done   . Coordination of Care:  1. Have you been to the ER, urgent care clinic since your last visit? Hospitalized since your last visit? no    2. Have you seen or consulted any other health care providers outside of the [address] [place] [address] since your last visit? Include any pap smears or colon screening.  no

## 2021-04-19 NOTE — PROGRESS NOTES
1. Chronic obstructive pulmonary disease, unspecified COPD type ([facility] [address]  At this juncture I have ordered pulmonary referral and so has the patient's cardiologist.  She has still not heard anything back. I gave her the phone number of her most recent pulmonary referral.  Of asked her to give them a call and find out what is going on. If she cannot through get through she is to call my office and we will figure out a way to get this taken care of her for her    2. Trapezius muscle spasm  She has reproducible pain about her left scapula. She has been getting relief with 1% patches although she notes it still there. Will increase to Lidoderm 5%  - lidocaine (LIDODERM) 5 %; Apply patch to the affected area for 12 hours a day and remove for 12 hours a day. Dispense: 30 Each; Refill: 2    3. Nausea  Is a chronic problem. We will renew her Phenergan  - promethazine (PHENERGAN) 25 mg tablet; Take 1 Tab by mouth every twelve (12) hours as needed for Nausea. Dispense: 60 Tab; Refill: 3    Total time spent was approx 32 minutes. approx 20 minutes spent at the bedside and exam. The remaining time was spent reviewing her cardiologists note and looking up referal information   Chief Complaint   Patient presents with    Follow-up     4 week        HPI   This is a delightful 49-year-old female with a history of COPD hypertension and pulmonary hypertension as well who presents for follow-up. Her primary complaint is that she is having some left-sided scapular pain that occasionally radiates into her chest.  She reports she has noticed it over the past couple days. It is made worse when she gets short of breath but it completely goes away when she is at rest.  It is described as a sharp searing pain about her left shoulder right where the scapula and the spine come together.   She also reports sometimes the pain will move from that area to her left chest.  She has had this before and admits that usually occurs when she is having a COPD exacerbation or when she is exerting herself too much. She otherwise reports she has been doing well. She reports chronic nausea for which she takes Phenergan as needed. She denies any other chest pain or chest pressure she has no nausea or vomiting currently. She notes she usually takes Phenergan once or twice every couple days for her nausea. She otherwise reports she is feeling okay. She is looking forward to have meeting a new pulmonologist  Patient Active Problem List   Diagnosis Code    Allergic rhinitis J30.9    Cardiac pacemaker in situ Z95.0    Chronic constipation K59.09    Chronic obstructive lung disease ([facility] [address] J44.9    Congestive heart failure (CHF) ([facility]) I50.9    Coronary arteriosclerosis I25.10    Diverticular disease K57.90    Essential hypertension I10    Gastroesophageal reflux disease K21.9    Hyperlipidemia E78.5    My[facility] infarction ([facility] I21.9    Osteopenia M85.80    Pulmonary hypertension ([facility]) I27.20        Current Outpatient Medications on File Prior to Visit   Medication Sig Dispense Refill    lisinopriL (PRINIVIL, ZESTRIL) 20 mg tablet Take 20 mg by mouth daily.  famotidine (PEPCID) 20 mg tablet Take 20 mg by mouth two (2) times a day.  Linzess 72 mcg cap capsule Take 1 capsule by mouth once daily for 90 days 90 Cap 0    albuterol sulfate 90 mcg/actuation aebs Take 90 mcg by inhalation daily.  arformoteroL (BROVANA) 15 mcg/2 mL nebu neb solution 15 mcg by Nebulization route.  cetirizine (ZYRTEC) 5 mg tablet Take 5 mg by mouth daily.  fluticasone propionate (FLONASE ALLERGY RELIEF NA) 1 [place] by Nasal route.  montelukast (Singulair) 10 mg tablet Take 10 mg by mouth daily.  denosumab (Prolia) 60 mg/mL injection 60 mg by SubCUTAneous route.  levocetirizine (XYZAL) 5 mg tablet Take 5 mg by mouth daily.  Oxygen 2 Liters at night      amLODIPine (NORVASC) 5 mg tablet Take 1 Tab by mouth daily.  90 Tab 3    methocarbamoL (ROBAXIN) 500 mg tablet Take 1 Tab by mouth four (4) times daily. 360 Tab 3    apixaban (ELIQUIS) 2.5 mg tablet Take 2.5 mg by mouth two (2) times a day.  simvastatin (ZOCOR) 20 mg tablet Take 20 mg by mouth nightly.  cyanocobalamin (VITAMIN B12) 500 mcg tablet Take 500 mcg by mouth daily.  calcium citrate-vitamin d3 (CITRACAL+D) 315 mg-5 mcg (200 unit) tab Take 1 Tab by mouth daily (with breakfast).  Omega-3 Fatty Acids 60- mg cpDR Take  by mouth.  multivitamin (ONE A DAY) tablet Take 1 Tab by mouth daily.  polyethylene glycol (MIRALAX) 17 gram/dose powder Take 17 g by mouth daily.  furosemide (LASIX) 20 mg tablet Take 20 mg by mouth daily.  carvediloL (COREG) 6.25 mg tablet Take 3.125 mg by mouth two (2) times daily (with meals). No current facility-administered medications on file prior to visit. ROS  - GEN: no weight gain/loss, no fevers or chills  - HEENT: no vision changes, no tinnitus, no sore throat  - CV: no cp, palpitations or edema  - RESP: + sob,- cough  - ABD: no n/v/d, no blood in stool  - : no dysuria or changes in freq. - SKIN: no rashes, ulcers  - Neuro: no resting tremors, parasthesia in extremities, no headaches  - MS: No weakness in extremities, + gait abnormalities walks with cane  - Psych: negative for depression or anxiety      Visit Vitals  /72   Pulse 81   Temp 98.2 °F (36.8 °C)   Resp 22   Ht 5' 3\" (1.6 m)   Wt 132 lb (59.9 kg)   SpO2 95%   BMI 23.38 kg/m²           Physical Exam  Constitutional:       Appearance: Normal appearance. Normal weight. NAD and pleasant  HENT:      Head: Normocephalic. Nose: Nose normal.      Mouth/Throat:      Mouth: Mucous membranes are moist. Throat not inflammed  Eyes:      Extraocular Movements: Extraocular movements intact. Conjunctiva/sclera: Conjunctivae normal. Sclera anicteric     Pupils: Pupils are equal, round, and reactive to light.    Cardiovascular: Rate and Rhythm: Normal rate and regular rhythm. Pulses: Normal pulses. Pulmonary:      Effort: No respiratory distress. Breath sounds: CTAB and No stridor. No rhonchi. Abdominal:      General: There is no distension. NT, ND  Neurological:      Mental Status: patient is alert and oriented times 3.  No resting tremor, abnml gait     Cranial Nerves: cranial nerves grossly intact  Muskuloskeletal     Full ROM in extremities     abnml gait  Skin     Dry without lesions on examined areas, warm to the touch       Deferred  Psychiatry     Calm, normal affect, interacting normally

## 2021-05-11 DIAGNOSIS — J44.9 CHRONIC OBSTRUCTIVE PULMONARY DISEASE, UNSPECIFIED COPD TYPE (HCC): Primary | ICD-10-CM

## 2021-05-11 RX ORDER — ALBUTEROL SULFATE 90 UG/1
AEROSOL, METERED RESPIRATORY (INHALATION)
COMMUNITY
End: 2021-05-11

## 2021-05-11 RX ORDER — ALBUTEROL SULFATE 90 UG/1
2 AEROSOL, METERED RESPIRATORY (INHALATION)
Status: DISCONTINUED | OUTPATIENT
Start: 2021-05-11 | End: 2021-10-25

## 2021-05-11 RX ORDER — ALBUTEROL SULFATE 90 UG/1
2 AEROSOL, METERED RESPIRATORY (INHALATION)
Qty: 1 INHALER | Refills: 3 | Status: SHIPPED | OUTPATIENT
Start: 2021-05-11

## 2021-06-21 ENCOUNTER — HOSPITAL ENCOUNTER (OUTPATIENT)
Dept: GENERAL RADIOLOGY | Age: 86
Discharge: HOME | End: 2021-06-21
Attending: INTERNAL MEDICINE
Payer: MEDICARE

## 2021-06-21 ENCOUNTER — HOSPITAL ENCOUNTER (OUTPATIENT)
Dept: LAB | Age: 86
Discharge: HOME | End: 2021-06-21
Payer: MEDICARE

## 2021-06-21 ENCOUNTER — OFFICE VISIT (OUTPATIENT)
Dept: INTERNAL MEDICINE CLINIC | Age: 86
End: 2021-06-21
Payer: MEDICARE

## 2021-06-21 VITALS
BODY MASS INDEX: 23.07 KG/M2 | SYSTOLIC BLOOD PRESSURE: 127 MMHG | RESPIRATION RATE: 22 BRPM | TEMPERATURE: 97.8 F | WEIGHT: 130.2 LBS | OXYGEN SATURATION: 96 % | HEIGHT: 63 IN | DIASTOLIC BLOOD PRESSURE: 70 MMHG | HEART RATE: 64 BPM

## 2021-06-21 DIAGNOSIS — J44.1 COPD EXACERBATION (HCC): Primary | ICD-10-CM

## 2021-06-21 DIAGNOSIS — R53.83 FATIGUE, UNSPECIFIED TYPE: ICD-10-CM

## 2021-06-21 DIAGNOSIS — J44.1 COPD EXACERBATION (HCC): ICD-10-CM

## 2021-06-21 LAB
ALBUMIN SERPL-MCNC: 4.3 G/DL (ref 3.5–4.7)
ALBUMIN/GLOB SERPL: 1.1 {RATIO}
ALP SERPL-CCNC: 87 U/L (ref 38–126)
ALT SERPL-CCNC: 16 U/L (ref 3–52)
ANION GAP SERPL CALC-SCNC: 11 MMOL/L
AST SERPL W P-5'-P-CCNC: 22 U/L (ref 14–74)
BASOPHILS # BLD: 0 K/UL (ref 0–0.1)
BASOPHILS NFR BLD: 0 % (ref 0–2)
BILIRUB SERPL-MCNC: 0.6 MG/DL (ref 0.2–1)
BUN SERPL-MCNC: 12 MG/DL (ref 9–21)
BUN/CREAT SERPL: 17
CA-I BLD-MCNC: 10.3 MG/DL (ref 8.5–10.5)
CHLORIDE SERPL-SCNC: 106 MMOL/L (ref 94–111)
CO2 SERPL-SCNC: 26 MMOL/L (ref 21–33)
CREAT SERPL-MCNC: 0.7 MG/DL (ref 0.7–1.2)
EOSINOPHIL # BLD: 0.2 K/UL (ref 0–0.4)
EOSINOPHIL NFR BLD: 2 % (ref 0–5)
ERYTHROCYTE [DISTWIDTH] IN BLOOD BY AUTOMATED COUNT: 13.1 % (ref 11.6–14.5)
GLOBULIN SER CALC-MCNC: 3.9 G/DL
GLUCOSE SERPL-MCNC: 78 MG/DL (ref 70–110)
HCT VFR BLD AUTO: 42.5 % (ref 35–45)
HGB BLD-MCNC: 13.7 G/DL (ref 12–16)
IMM GRANULOCYTES # BLD AUTO: 0 K/UL
IMM GRANULOCYTES NFR BLD AUTO: 0 %
LYMPHOCYTES # BLD: 2.3 K/UL (ref 0.9–3.6)
LYMPHOCYTES NFR BLD: 28 % (ref 21–52)
MCH RBC QN AUTO: 32.3 PG (ref 24–34)
MCHC RBC AUTO-ENTMCNC: 32.2 G/DL (ref 31–37)
MCV RBC AUTO: 100.2 FL (ref 74–97)
MONOCYTES # BLD: 0.8 K/UL (ref 0.05–1.2)
MONOCYTES NFR BLD: 10 % (ref 3–10)
NEUTS SEG # BLD: 4.9 K/UL (ref 1.8–8)
NEUTS SEG NFR BLD: 60 % (ref 40–73)
PLATELET # BLD AUTO: 312 K/UL (ref 135–420)
PMV BLD AUTO: 10.5 FL (ref 9.2–11.8)
POTASSIUM SERPL-SCNC: 5.3 MMOL/L (ref 3.2–5.1)
PROT SERPL-MCNC: 8.2 G/DL (ref 6.1–8.4)
RBC # BLD AUTO: 4.24 M/UL (ref 4.2–5.3)
SODIUM SERPL-SCNC: 143 MMOL/L (ref 135–145)
WBC # BLD AUTO: 8.2 K/UL (ref 4.6–13.2)

## 2021-06-21 PROCEDURE — G8420 CALC BMI NORM PARAMETERS: HCPCS | Performed by: INTERNAL MEDICINE

## 2021-06-21 PROCEDURE — 99214 OFFICE O/P EST MOD 30 MIN: CPT | Performed by: INTERNAL MEDICINE

## 2021-06-21 PROCEDURE — 1100F PTFALLS ASSESS-DOCD GE2>/YR: CPT | Performed by: INTERNAL MEDICINE

## 2021-06-21 PROCEDURE — 36415 COLL VENOUS BLD VENIPUNCTURE: CPT

## 2021-06-21 PROCEDURE — G8510 SCR DEP NEG, NO PLAN REQD: HCPCS | Performed by: INTERNAL MEDICINE

## 2021-06-21 PROCEDURE — 85025 COMPLETE CBC W/AUTO DIFF WBC: CPT

## 2021-06-21 PROCEDURE — 71046 X-RAY EXAM CHEST 2 VIEWS: CPT

## 2021-06-21 PROCEDURE — 1090F PRES/ABSN URINE INCON ASSESS: CPT | Performed by: INTERNAL MEDICINE

## 2021-06-21 PROCEDURE — 80053 COMPREHEN METABOLIC PANEL: CPT

## 2021-06-21 PROCEDURE — G8536 NO DOC ELDER MAL SCRN: HCPCS | Performed by: INTERNAL MEDICINE

## 2021-06-21 PROCEDURE — G8427 DOCREV CUR MEDS BY ELIG CLIN: HCPCS | Performed by: INTERNAL MEDICINE

## 2021-06-21 PROCEDURE — 3288F FALL RISK ASSESSMENT DOCD: CPT | Performed by: INTERNAL MEDICINE

## 2021-06-21 RX ORDER — METHYLPREDNISOLONE 4 MG/1
TABLET ORAL
Qty: 1 DOSE PACK | Refills: 0 | Status: SHIPPED | OUTPATIENT
Start: 2021-06-21 | End: 2021-09-06

## 2021-06-21 NOTE — PROGRESS NOTES
Patient fell on her back 2 weeks ago, states her right ankle twisted and she fell. States that she has just been feeling really weak and no energy. Marc Funez presents today for   Chief Complaint   Patient presents with    Follow-up     2 month       Is someone accompanying this pt? noIs the patient using any DME equipment during OV? no    Depression Screening:  3 most recent PHQ Screens 6/21/2021   Little interest or pleasure in doing things Not at all   Feeling down, depressed, irritable, or hopeless Not at all   Total Score PHQ 2 0       Learning Assessment:  Learning Assessment 1/25/2021   PRIMARY LEARNER Patient   HIGHEST LEVEL OF EDUCATION - PRIMARY LEARNER  GRADUATED HIGH SCHOOL OR GED   BARRIERS PRIMARY LEARNER NONE   PRIMARY LANGUAGE ENGLISH   LEARNER PREFERENCE PRIMARY READING   ANSWERED BY patient   RELATIONSHIP SELF       Fall Risk  Fall Risk Assessment, last 12 mths 6/21/2021   Able to walk? Yes   Fall in past 12 months? 0   Do you feel unsteady? 0   Are you worried about falling 0   Fall with injury? -       ADL  ADL Assessment 6/21/2021   Feeding yourself No Help Needed   Getting from bed to chair No Help Needed   Getting dressed No Help Needed   Bathing or showering No Help Needed   Walk across the room (includes cane/walker) No Help Needed   Using the telphone No Help Needed   Taking your medications No Help Needed   Preparing meals No Help Needed   Managing money (expenses/bills) No Help Needed   Moderately strenuous housework (laundry) No Help Needed   Shopping for personal items (toiletries/medicines) No Help Needed   Shopping for groceries No Help Needed   Driving No Help Needed   Climbing a flight of stairs No Help Needed   Getting to places beyond walking distances No Help Needed       Health Maintenance reviewed and discussed and ordered per Provider.     Health Maintenance Due   Topic Date Due    DTaP/Tdap/Td series (1 - Tdap) Never done    Shingrix Vaccine Age 50> (1 of 2) Never done    Bone Densitometry (Dexa) Screening  Never done   . Coordination of Care:  1. Have you been to the ER, urgent care clinic since your last visit? Hospitalized since your last visit? no    2. Have you seen or consulted any other health care providers outside of the [address] [place] [address] since your last visit? Include any pap smears or colon screening.  no

## 2021-06-21 NOTE — PROGRESS NOTES
1. COPD exacerbation ([facility] [address]  She appears to be having a mild COPD exacerbation. She already has fairly advanced COPD and is following with pulmonary. We will start a Medrol Dosepak I am also ordering a chest x-ray to make sure she does not have a pneumonia. Check a CBC  - methylPREDNISolone (MEDROL DOSEPACK) 4 mg tablet; Take as directed  Dispense: 1 Dose Pack; Refill: 0  - CBC WITH AUTOMATED DIFF  - XR CHEST PA LAT; Future    2. Fatigue, unspecified type  She is also been complaining of fatigue. I am going to check a CMP to make sure her renal function is normal  - METABOLIC PANEL, COMPREHENSIVE      Chief Complaint   Patient presents with    Follow-up     2 month        HPI   This is a very pleasant 14-year-old female with a longstanding history of COPD hypertension and heart failure in the past who presents just not feeling herself. She reports she feels tired and weak. She has noticed some wheezing in her lungs as well. She does not have any chest pain she reports a slight dry cough. She had some diarrhea last week which is subsequently improved. She also reports a slight scratchy throat and generalized malaise. She has been taking all of her medications as prescribed. She has noted she has been sleeping a little bit more. She did finally see pulmonary and they recommended pulmonary PT and she has been participating in that. She denies any new shortness of breath while laying flat or swelling in her legs.   Patient Active Problem List   Diagnosis Code    Allergic rhinitis J30.9    Cardiac pacemaker in situ Z95.0    Chronic constipation K59.09    [facility] J44.9    Congestive heart failure (CHF) ([facility] I50.9    Coronary arteriosclerosis I25.10    Diverticular disease K57.90    Essential hypertension I10    Gastroesophageal reflux disease K21.9    Hyperlipidemia E78.5    Myocardial infarction ([facility] [address] I21.9    Osteopenia M85.80    Pulmonary hypertension ([facility] [address] I27.20        Current Outpatient Medications on File Prior to Visit   Medication Sig Dispense Refill    albuterol (PROVENTIL HFA, VENTOLIN HFA, PROAIR HFA) 90 mcg/actuation inhaler Take 2 Puffs by inhalation every six (6) hours as needed for Wheezing. Indications: chronic obstructive pulmonary disease 1 Inhaler 3    lidocaine (LIDODERM) 5 % Apply patch to the affected area for 12 hours a day and remove for 12 hours a day. 30 Each 2    promethazine (PHENERGAN) 25 mg tablet Take 1 Tab by mouth every twelve (12) hours as needed for Nausea. 60 Tab 3    lisinopriL (PRINIVIL, ZESTRIL) 20 mg tablet Take 20 mg by mouth daily.  famotidine (PEPCID) 20 mg tablet Take 20 mg by mouth two (2) times a day.  Linzess 72 mcg cap capsule Take 1 capsule by mouth once daily for 90 days 90 Cap 0    arformoteroL (BROVANA) 15 mcg/2 mL nebu neb solution 15 mcg by Nebulization route.  cetirizine (ZYRTEC) 5 mg tablet Take 5 mg by mouth daily.  fluticasone propionate (FLONASE ALLERGY RELIEF NA) 1 [place] by Nasal route.  montelukast (Singulair) 10 mg tablet Take 10 mg by mouth daily.  denosumab (Prolia) 60 mg/mL injection 60 mg by SubCUTAneous route.  levocetirizine (XYZAL) 5 mg tablet Take 5 mg by mouth daily.  Oxygen 2 Liters at night      amLODIPine (NORVASC) 5 mg tablet Take 1 Tab by mouth daily. 90 Tab 3    methocarbamoL (ROBAXIN) 500 mg tablet Take 1 Tab by mouth four (4) times daily. 360 Tab 3    apixaban (ELIQUIS) 2.5 mg tablet Take 2.5 mg by mouth two (2) times a day.  simvastatin (ZOCOR) 20 mg tablet Take 20 mg by mouth nightly.  cyanocobalamin (VITAMIN B12) 500 mcg tablet Take 500 mcg by mouth daily.  calcium citrate-vitamin d3 (CITRACAL+D) 315 mg-5 mcg (200 unit) tab Take 1 Tab by mouth daily (with breakfast).  Omega-3 Fatty Acids 60- mg cpDR Take  by mouth.  multivitamin (ONE A DAY) tablet Take 1 Tab by mouth daily.       polyethylene glycol (MIRALAX) 17 gram/dose powder Take 17 g by mouth daily.  furosemide (LASIX) 20 mg tablet Take 20 mg by mouth daily.  carvediloL (COREG) 6.25 mg tablet Take 3.125 mg by mouth two (2) times daily (with meals). Current Facility-Administered Medications on File Prior to Visit   Medication Dose Route Frequency Provider Last Rate Last Admin    albuterol (PROVENTIL HFA, VENTOLIN HFA, PROAIR HFA) inhaler 2 Puff  2 Puff Inhalation Q6H PRN Levon Shanks MD            ROS  - GEN: no weight gain/loss, no fevers or chills  - HEENT: no vision changes, no tinnitus, no sore throat  - CV: no cp, palpitations or edema  - RESP: no sob, +cough  - ABD: no n/v/d, no blood in stool  - : no dysuria or changes in freq. - SKIN: no rashes, ulcers  - Neuro: no resting tremors, parasthesia in extremities, no headaches  - MS: No weakness in extremities, no gait abnormalities  - Psych: negative for depression or anxiety      Visit Vitals  /70   Pulse 64   Temp 97.8 °F (36.6 °C)   Resp 22   Ht 5' 3\" (1.6 m)   Wt 130 lb 3.2 oz (59.1 kg)   SpO2 96%   BMI 23.06 kg/m²           Physical Exam  Constitutional:       Appearance: Normal appearance. Normal weight. NAD and pleasant  HENT:      Head: Normocephalic. Nose: Nose normal.      Mouth/Throat:      Mouth: Mucous membranes are moist. Throat not inflammed  Eyes:      Extraocular Movements: Extraocular movements intact. Conjunctiva/sclera: Conjunctivae normal. Sclera anicteric     Pupils: Pupils are equal, round, and reactive to light. Cardiovascular:      Rate and Rhythm: Normal rate and regular rhythm. Pulses: Normal pulses. Pulmonary:      Effort: No respiratory distress. Breath sounds: scant exp wheezes bilaterally. Abdominal:      General: There is no distension. NT, ND  Neurological:      Mental Status: patient is alert and oriented times 3.  No resting tremor, normal gait     Cranial Nerves: cranial nerves grossly intact  Muskuloskeletal     Full ROM in extremities     Normal gait  Skin     Dry without lesions on examined areas, warm to the touch       Deferred  Psychiatry     Calm, normal affect, interacting normally

## 2021-09-06 ENCOUNTER — APPOINTMENT (OUTPATIENT)
Dept: CT IMAGING | Age: 86
DRG: 392 | End: 2021-09-06
Attending: FAMILY MEDICINE
Payer: MEDICARE

## 2021-09-06 ENCOUNTER — HOSPITAL ENCOUNTER (INPATIENT)
Age: 86
LOS: 8 days | Discharge: NURSING, REHAB OR LONG-TERM CARE | DRG: 392 | End: 2021-09-14
Attending: FAMILY MEDICINE | Admitting: INTERNAL MEDICINE
Payer: MEDICARE

## 2021-09-06 DIAGNOSIS — K52.9 NONINFECTIOUS GASTROENTERITIS, UNSPECIFIED TYPE: Primary | ICD-10-CM

## 2021-09-06 DIAGNOSIS — J44.9 CHRONIC OBSTRUCTIVE PULMONARY DISEASE, UNSPECIFIED COPD TYPE (HCC): ICD-10-CM

## 2021-09-06 PROBLEM — R10.9 ABDOMINAL PAIN: Status: ACTIVE | Noted: 2021-09-06

## 2021-09-06 PROBLEM — I48.0 PAROXYSMAL ATRIAL FIBRILLATION (HCC): Status: ACTIVE | Noted: 2021-09-06

## 2021-09-06 PROBLEM — R91.8 LUNG MASS: Status: ACTIVE | Noted: 2021-09-06

## 2021-09-06 PROBLEM — R33.9 URINARY RETENTION: Status: ACTIVE | Noted: 2021-09-06

## 2021-09-06 PROBLEM — E87.6 HYPOKALEMIA: Status: ACTIVE | Noted: 2021-09-06

## 2021-09-06 LAB
ALBUMIN SERPL-MCNC: 2.9 G/DL (ref 3.5–4.7)
ALBUMIN/GLOB SERPL: 0.8 {RATIO}
ALP SERPL-CCNC: 59 U/L (ref 38–126)
ALT SERPL-CCNC: 19 U/L (ref 3–52)
ANION GAP SERPL CALC-SCNC: 10 MMOL/L
AST SERPL W P-5'-P-CCNC: 28 U/L (ref 14–74)
BASOPHILS # BLD: 0 K/UL (ref 0–0.1)
BASOPHILS NFR BLD: 0 % (ref 0–2)
BILIRUB SERPL-MCNC: 0.5 MG/DL (ref 0.2–1)
BUN SERPL-MCNC: 8 MG/DL (ref 9–21)
BUN/CREAT SERPL: 16
CA-I BLD-MCNC: 9 MG/DL (ref 8.5–10.5)
CHLORIDE SERPL-SCNC: 105 MMOL/L (ref 94–111)
CO2 SERPL-SCNC: 25 MMOL/L (ref 21–33)
CREAT SERPL-MCNC: 0.5 MG/DL (ref 0.7–1.2)
DIFFERENTIAL METHOD BLD: ABNORMAL
EOSINOPHIL # BLD: 0 K/UL (ref 0–0.4)
EOSINOPHIL NFR BLD: 0 % (ref 0–5)
ERYTHROCYTE [DISTWIDTH] IN BLOOD BY AUTOMATED COUNT: 13.5 % (ref 11.6–14.5)
GLOBULIN SER CALC-MCNC: 3.5 G/DL
GLUCOSE SERPL-MCNC: 125 MG/DL (ref 70–110)
HCT VFR BLD AUTO: 33.1 % (ref 35–45)
HGB BLD-MCNC: 10.8 G/DL (ref 12–16)
IMM GRANULOCYTES # BLD AUTO: 0 K/UL
IMM GRANULOCYTES NFR BLD AUTO: 0 %
LYMPHOCYTES # BLD: 2.3 K/UL (ref 0.9–3.6)
LYMPHOCYTES NFR BLD: 11 % (ref 21–52)
MAGNESIUM SERPL-MCNC: 1.9 MG/DL (ref 1.7–2.8)
MCH RBC QN AUTO: 31.6 PG (ref 24–34)
MCHC RBC AUTO-ENTMCNC: 32.6 G/DL (ref 31–37)
MCV RBC AUTO: 96.8 FL (ref 78–100)
MONOCYTES # BLD: 0 K/UL (ref 0.05–1.2)
MONOCYTES NFR BLD: 0 % (ref 3–10)
NEUTS SEG # BLD: 18.4 K/UL (ref 1.8–8)
NEUTS SEG NFR BLD: 89 % (ref 40–73)
NRBC # BLD: 0 K/UL (ref 0–0.01)
NRBC BLD-RTO: 0 PER 100 WBC
PLATELET # BLD AUTO: 336 K/UL (ref 135–420)
PMV BLD AUTO: 9.1 FL (ref 9.2–11.8)
POTASSIUM SERPL-SCNC: 3.1 MMOL/L (ref 3.2–5.1)
PROT SERPL-MCNC: 6.4 G/DL (ref 6.1–8.4)
RBC # BLD AUTO: 3.42 M/UL (ref 4.2–5.3)
RBC MORPH BLD: ABNORMAL
SODIUM SERPL-SCNC: 140 MMOL/L (ref 135–145)
WBC # BLD AUTO: 20.7 K/UL (ref 4.6–13.2)

## 2021-09-06 PROCEDURE — 96374 THER/PROPH/DIAG INJ IV PUSH: CPT

## 2021-09-06 PROCEDURE — 74011250637 HC RX REV CODE- 250/637: Performed by: NURSE PRACTITIONER

## 2021-09-06 PROCEDURE — 74011000258 HC RX REV CODE- 258: Performed by: FAMILY MEDICINE

## 2021-09-06 PROCEDURE — 80053 COMPREHEN METABOLIC PANEL: CPT

## 2021-09-06 PROCEDURE — 74177 CT ABD & PELVIS W/CONTRAST: CPT

## 2021-09-06 PROCEDURE — 74011250636 HC RX REV CODE- 250/636: Performed by: INTERNAL MEDICINE

## 2021-09-06 PROCEDURE — 96375 TX/PRO/DX INJ NEW DRUG ADDON: CPT

## 2021-09-06 PROCEDURE — 74011250636 HC RX REV CODE- 250/636: Performed by: FAMILY MEDICINE

## 2021-09-06 PROCEDURE — 99283 EMERGENCY DEPT VISIT LOW MDM: CPT

## 2021-09-06 PROCEDURE — 74011250636 HC RX REV CODE- 250/636: Performed by: NURSE PRACTITIONER

## 2021-09-06 PROCEDURE — 65270000029 HC RM PRIVATE

## 2021-09-06 PROCEDURE — 85025 COMPLETE CBC W/AUTO DIFF WBC: CPT

## 2021-09-06 PROCEDURE — 74011000636 HC RX REV CODE- 636: Performed by: FAMILY MEDICINE

## 2021-09-06 PROCEDURE — 83735 ASSAY OF MAGNESIUM: CPT

## 2021-09-06 RX ORDER — ONDANSETRON 4 MG/1
4 TABLET, ORALLY DISINTEGRATING ORAL
Status: DISCONTINUED | OUTPATIENT
Start: 2021-09-06 | End: 2021-09-14 | Stop reason: HOSPADM

## 2021-09-06 RX ORDER — THERA TABS 400 MCG
1 TAB ORAL DAILY
Status: DISCONTINUED | OUTPATIENT
Start: 2021-09-07 | End: 2021-09-14 | Stop reason: HOSPADM

## 2021-09-06 RX ORDER — LISINOPRIL 20 MG/1
20 TABLET ORAL DAILY
Status: DISCONTINUED | OUTPATIENT
Start: 2021-09-07 | End: 2021-09-14 | Stop reason: HOSPADM

## 2021-09-06 RX ORDER — POLYETHYLENE GLYCOL 3350 17 G/17G
17 POWDER, FOR SOLUTION ORAL DAILY PRN
Status: DISCONTINUED | OUTPATIENT
Start: 2021-09-06 | End: 2021-09-14 | Stop reason: HOSPADM

## 2021-09-06 RX ORDER — SODIUM CHLORIDE 0.9 % (FLUSH) 0.9 %
5-40 SYRINGE (ML) INJECTION EVERY 8 HOURS
Status: DISCONTINUED | OUTPATIENT
Start: 2021-09-06 | End: 2021-09-14 | Stop reason: HOSPADM

## 2021-09-06 RX ORDER — ATORVASTATIN CALCIUM 10 MG/1
20 TABLET, FILM COATED ORAL
Status: DISCONTINUED | OUTPATIENT
Start: 2021-09-06 | End: 2021-09-14 | Stop reason: HOSPADM

## 2021-09-06 RX ORDER — MORPHINE SULFATE 2 MG/ML
1 INJECTION, SOLUTION INTRAMUSCULAR; INTRAVENOUS
Status: DISCONTINUED | OUTPATIENT
Start: 2021-09-06 | End: 2021-09-06

## 2021-09-06 RX ORDER — ONDANSETRON 2 MG/ML
4 INJECTION INTRAMUSCULAR; INTRAVENOUS
Status: DISCONTINUED | OUTPATIENT
Start: 2021-09-06 | End: 2021-09-14 | Stop reason: HOSPADM

## 2021-09-06 RX ORDER — KETOROLAC TROMETHAMINE 30 MG/ML
15 INJECTION, SOLUTION INTRAMUSCULAR; INTRAVENOUS
Status: COMPLETED | OUTPATIENT
Start: 2021-09-06 | End: 2021-09-06

## 2021-09-06 RX ORDER — LEVOFLOXACIN 5 MG/ML
750 INJECTION, SOLUTION INTRAVENOUS EVERY 24 HOURS
Status: DISCONTINUED | OUTPATIENT
Start: 2021-09-06 | End: 2021-09-06

## 2021-09-06 RX ORDER — TAMSULOSIN HYDROCHLORIDE 0.4 MG/1
0.4 CAPSULE ORAL DAILY
Status: DISCONTINUED | OUTPATIENT
Start: 2021-09-07 | End: 2021-09-14 | Stop reason: HOSPADM

## 2021-09-06 RX ORDER — CARVEDILOL 3.12 MG/1
3.12 TABLET ORAL 2 TIMES DAILY WITH MEALS
Status: DISCONTINUED | OUTPATIENT
Start: 2021-09-06 | End: 2021-09-14 | Stop reason: HOSPADM

## 2021-09-06 RX ORDER — LANOLIN ALCOHOL/MO/W.PET/CERES
3 CREAM (GRAM) TOPICAL
Status: DISCONTINUED | OUTPATIENT
Start: 2021-09-06 | End: 2021-09-14 | Stop reason: HOSPADM

## 2021-09-06 RX ORDER — SODIUM CHLORIDE 9 MG/ML
50 INJECTION, SOLUTION INTRAVENOUS CONTINUOUS
Status: DISPENSED | OUTPATIENT
Start: 2021-09-06 | End: 2021-09-07

## 2021-09-06 RX ORDER — ALBUTEROL SULFATE 90 UG/1
2 AEROSOL, METERED RESPIRATORY (INHALATION)
Status: DISCONTINUED | OUTPATIENT
Start: 2021-09-06 | End: 2021-09-14 | Stop reason: HOSPADM

## 2021-09-06 RX ORDER — FUROSEMIDE 40 MG/1
40 TABLET ORAL DAILY
Status: DISCONTINUED | OUTPATIENT
Start: 2021-09-07 | End: 2021-09-14 | Stop reason: HOSPADM

## 2021-09-06 RX ORDER — AMLODIPINE BESYLATE 5 MG/1
5 TABLET ORAL DAILY
Status: DISCONTINUED | OUTPATIENT
Start: 2021-09-07 | End: 2021-09-14 | Stop reason: HOSPADM

## 2021-09-06 RX ORDER — SODIUM CHLORIDE 0.9 % (FLUSH) 0.9 %
5-40 SYRINGE (ML) INJECTION AS NEEDED
Status: DISCONTINUED | OUTPATIENT
Start: 2021-09-06 | End: 2021-09-14 | Stop reason: HOSPADM

## 2021-09-06 RX ORDER — TRAMADOL HYDROCHLORIDE 50 MG/1
50 TABLET ORAL
Status: DISCONTINUED | OUTPATIENT
Start: 2021-09-06 | End: 2021-09-14 | Stop reason: HOSPADM

## 2021-09-06 RX ORDER — SODIUM CHLORIDE 9 MG/ML
75 INJECTION, SOLUTION INTRAVENOUS CONTINUOUS
Status: DISCONTINUED | OUTPATIENT
Start: 2021-09-06 | End: 2021-09-06

## 2021-09-06 RX ORDER — LEVOFLOXACIN 5 MG/ML
750 INJECTION, SOLUTION INTRAVENOUS
Status: DISCONTINUED | OUTPATIENT
Start: 2021-09-06 | End: 2021-09-10

## 2021-09-06 RX ORDER — CALCIUM CARBONATE/VITAMIN D3 250-3.125
1 TABLET ORAL DAILY
Status: DISCONTINUED | OUTPATIENT
Start: 2021-09-07 | End: 2021-09-14 | Stop reason: HOSPADM

## 2021-09-06 RX ORDER — TAMSULOSIN HYDROCHLORIDE 0.4 MG/1
0.4 CAPSULE ORAL DAILY
COMMUNITY

## 2021-09-06 RX ORDER — SODIUM CHLORIDE 0.9 % (FLUSH) 0.9 %
5-10 SYRINGE (ML) INJECTION
Status: COMPLETED | OUTPATIENT
Start: 2021-09-06 | End: 2021-09-06

## 2021-09-06 RX ORDER — POTASSIUM CHLORIDE 750 MG/1
20 TABLET, EXTENDED RELEASE ORAL
Status: COMPLETED | OUTPATIENT
Start: 2021-09-06 | End: 2021-09-06

## 2021-09-06 RX ORDER — ACETAMINOPHEN 325 MG/1
650 TABLET ORAL
Status: DISCONTINUED | OUTPATIENT
Start: 2021-09-06 | End: 2021-09-14 | Stop reason: HOSPADM

## 2021-09-06 RX ORDER — ACETAMINOPHEN 650 MG/1
650 SUPPOSITORY RECTAL
Status: DISCONTINUED | OUTPATIENT
Start: 2021-09-06 | End: 2021-09-14 | Stop reason: HOSPADM

## 2021-09-06 RX ORDER — FAMOTIDINE 20 MG/1
20 TABLET, FILM COATED ORAL 2 TIMES DAILY
Status: DISCONTINUED | OUTPATIENT
Start: 2021-09-06 | End: 2021-09-08

## 2021-09-06 RX ORDER — FLUTICASONE PROPIONATE 50 MCG
1 SPRAY, SUSPENSION (ML) NASAL DAILY
Status: DISCONTINUED | OUTPATIENT
Start: 2021-09-07 | End: 2021-09-14 | Stop reason: HOSPADM

## 2021-09-06 RX ORDER — METRONIDAZOLE 500 MG/100ML
500 INJECTION, SOLUTION INTRAVENOUS
Status: COMPLETED | OUTPATIENT
Start: 2021-09-06 | End: 2021-09-06

## 2021-09-06 RX ORDER — METRONIDAZOLE 500 MG/100ML
500 INJECTION, SOLUTION INTRAVENOUS EVERY 8 HOURS
Status: DISCONTINUED | OUTPATIENT
Start: 2021-09-06 | End: 2021-09-10

## 2021-09-06 RX ADMIN — METRONIDAZOLE 500 MG: 500 INJECTION, SOLUTION INTRAVENOUS at 15:24

## 2021-09-06 RX ADMIN — ONDANSETRON 4 MG: 2 INJECTION INTRAMUSCULAR; INTRAVENOUS at 17:19

## 2021-09-06 RX ADMIN — KETOROLAC TROMETHAMINE 15 MG: 30 INJECTION, SOLUTION INTRAMUSCULAR at 12:46

## 2021-09-06 RX ADMIN — APIXABAN 2.5 MG: 2.5 TABLET, FILM COATED ORAL at 16:53

## 2021-09-06 RX ADMIN — SODIUM CHLORIDE 1000 ML: 9 INJECTION, SOLUTION INTRAVENOUS at 15:24

## 2021-09-06 RX ADMIN — Medication 10 ML: at 15:12

## 2021-09-06 RX ADMIN — Medication 10 ML: at 13:25

## 2021-09-06 RX ADMIN — SODIUM CHLORIDE 1000 ML: 9 INJECTION, SOLUTION INTRAVENOUS at 12:40

## 2021-09-06 RX ADMIN — ATORVASTATIN CALCIUM 20 MG: 10 TABLET, FILM COATED ORAL at 22:13

## 2021-09-06 RX ADMIN — SODIUM CHLORIDE 50 ML/HR: 9 INJECTION, SOLUTION INTRAVENOUS at 16:54

## 2021-09-06 RX ADMIN — CARVEDILOL 3.12 MG: 3.12 TABLET, FILM COATED ORAL at 16:53

## 2021-09-06 RX ADMIN — POTASSIUM CHLORIDE 20 MEQ: 750 TABLET, EXTENDED RELEASE ORAL at 16:53

## 2021-09-06 RX ADMIN — Medication 10 ML: at 22:14

## 2021-09-06 RX ADMIN — IOPAMIDOL 97 ML: 755 INJECTION, SOLUTION INTRAVENOUS at 13:28

## 2021-09-06 RX ADMIN — METRONIDAZOLE 500 MG: 500 INJECTION, SOLUTION INTRAVENOUS at 22:14

## 2021-09-06 RX ADMIN — PIPERACILLIN AND TAZOBACTAM 3.38 G: 3; .375 INJECTION, POWDER, LYOPHILIZED, FOR SOLUTION INTRAVENOUS at 12:41

## 2021-09-06 RX ADMIN — ACETAMINOPHEN 650 MG: 325 TABLET ORAL at 19:11

## 2021-09-06 RX ADMIN — LEVOFLOXACIN 750 MG: 5 INJECTION, SOLUTION INTRAVENOUS at 16:55

## 2021-09-06 RX ADMIN — FAMOTIDINE 20 MG: 20 TABLET, FILM COATED ORAL at 16:53

## 2021-09-06 NOTE — ED TRIAGE NOTES
Pt arrives via EMS from home c/o abdominal pain. Pt states she was diagnosed with diverticulitis last week at [facility]. Pt reports malaise, fatigue, and continued abdominal pain.

## 2021-09-06 NOTE — ASSESSMENT & PLAN NOTE
-abdominal pain, recent antibiotic treatment for diverticulitis, no improvement  -CT abdomen: Severe distal colitis involving rectum and roughly half of the sigmoid colon. There is also extensive sigmoid diverticulosis.   -NPO except for meds  -pain mgt Toradol  -antibiotics (Flagyl and Levofloxacin)  -clear liquids in 24 hours, advance as tolerated  -when tolerating diet switch to PO antibiotics  -IV hydration

## 2021-09-06 NOTE — ROUTINE PROCESS
TRANSFER - OUT REPORT:    Verbal report given to Pramod Paul RN on Para Block  being transferred to [facility] for admission. Report consisted of patients Situation, Background, Assessment and   Recommendations(SBAR). Information from the following report(s) ED Summary, MAR and Recent Results was reviewed with the receiving nurse. Lines:   Peripheral IV 09/06/21 Anterior; Left Hand (Active)        Opportunity for questions and clarification was provided.       Patient transported with:   Patient-specific medications from Pharmacy  Registered Nurse

## 2021-09-06 NOTE — ED PROVIDER NOTES
EMERGENCY DEPARTMENT HISTORY AND PHYSICAL EXAM      Date: 9/6/2021  Patient Name: Marie Ritter    History of Presenting Illness     Chief Complaint   Patient presents with    Abdominal Pain       History Provided By: Patient    HPI: Marie Ritter, 80 y.o. female with a past medical history significant hypertension, hyperlipidemia, myocardial infarction and asthma presents to the ED with cc of lower abdominal pain. Patient states that she was admitted to [facility] in [place] last week for diverticulitis. She been taking the antibiotics since being discharged. She is having increasing pain. She has a fever of 100.7 today. She says the pain is 8 out of 10. She taken meds without relief. She denies any fevers or chills. She denies cough or cold. There are no other complaints, changes, or physical findings at this time. PCP: Sam Núñez MD    Current Facility-Administered Medications on File Prior to Encounter   Medication Dose Route Frequency Provider Last Rate Last Admin    albuterol (PROVENTIL HFA, VENTOLIN HFA, PROAIR HFA) inhaler 2 Puff  2 Puff Inhalation Q6H PRN Sam Núñez MD         Current Outpatient Medications on File Prior to Encounter   Medication Sig Dispense Refill    methylPREDNISolone (MEDROL DOSEPACK) 4 mg tablet Take as directed 1 Dose Pack 0    albuterol (PROVENTIL HFA, VENTOLIN HFA, PROAIR HFA) 90 mcg/actuation inhaler Take 2 Puffs by inhalation every six (6) hours as needed for Wheezing. Indications: chronic obstructive pulmonary disease 1 Inhaler 3    lidocaine (LIDODERM) 5 % Apply patch to the affected area for 12 hours a day and remove for 12 hours a day. 30 Each 2    promethazine (PHENERGAN) 25 mg tablet Take 1 Tab by mouth every twelve (12) hours as needed for Nausea. 60 Tab 3    lisinopriL (PRINIVIL, ZESTRIL) 20 mg tablet Take 20 mg by mouth daily.  famotidine (PEPCID) 20 mg tablet Take 20 mg by mouth two (2) times a day.       Linzess 72 mcg cap capsule Take 1 capsule by mouth once daily for 90 days 90 Cap 0    arformoteroL (BROVANA) 15 mcg/2 mL nebu neb solution 15 mcg by Nebulization route.  cetirizine (ZYRTEC) 5 mg tablet Take 5 mg by mouth daily.  fluticasone propionate (FLONASE ALLERGY RELIEF NA) 1 [place] by Nasal route.  montelukast (Singulair) 10 mg tablet Take 10 mg by mouth daily.  denosumab (Prolia) 60 mg/mL injection 60 mg by SubCUTAneous route.  levocetirizine (XYZAL) 5 mg tablet Take 5 mg by mouth daily.  Oxygen 2 Liters at night      amLODIPine (NORVASC) 5 mg tablet Take 1 Tab by mouth daily. 90 Tab 3    methocarbamoL (ROBAXIN) 500 mg tablet Take 1 Tab by mouth four (4) times daily. 360 Tab 3    apixaban (ELIQUIS) 2.5 mg tablet Take 2.5 mg by mouth two (2) times a day.  simvastatin (ZOCOR) 20 mg tablet Take 20 mg by mouth nightly.  cyanocobalamin (VITAMIN B12) 500 mcg tablet Take 500 mcg by mouth daily.  calcium citrate-vitamin d3 (CITRACAL+D) 315 mg-5 mcg (200 unit) tab Take 1 Tab by mouth daily (with breakfast).  Omega-3 Fatty Acids 60- mg cpDR Take  by mouth.  multivitamin (ONE A DAY) tablet Take 1 Tab by mouth daily.  polyethylene glycol (MIRALAX) 17 gram/dose powder Take 17 g by mouth daily.  furosemide (LASIX) 20 mg tablet Take 20 mg by mouth daily.  carvediloL (COREG) 6.25 mg tablet Take 3.125 mg by mouth two (2) times daily (with meals).          Past History     Past Medical History:  Past Medical History:   Diagnosis Date    Arthritis     Asthma     CAD (coronary artery disease)     Heart disease     High cholesterol     Hyperlipidemia        Past Surgical History:  Past Surgical History:   Procedure Laterality Date    HX APPENDECTOMY      HX COLONOSCOPY  01/26/2015    per gi no further colonoscopy needed due to age of the patient, see note 1/26/15    HX HERNIA REPAIR Left 2001    inguinal    HX HYSTERECTOMY      [place] CARDIAC SURG PROCEDURE UNLIST  2007    defibrillator placed       Family History:  Family History   Problem Relation Age of Onset    Emphysema Mother        Social History:  Social History     Tobacco Use    Smoking status: Former Smoker    Smokeless tobacco: Never Used   Vaping Use    Vaping Use: Never used   Substance Use Topics    Alcohol use: Not Currently    Drug use: Never       Allergies: Allergies   Allergen Reactions    Codeine Nausea and Vomiting         Review of Systems     Review of Systems   Constitutional: Positive for fever. Negative for fatigue. HENT: Negative for rhinorrhea and sore throat. Respiratory: Negative for cough and shortness of breath. Cardiovascular: Negative for chest pain and palpitations. Gastrointestinal: Positive for abdominal pain and nausea. Negative for diarrhea and vomiting. Genitourinary: Negative for difficulty urinating and dysuria. Musculoskeletal: Negative for arthralgias and myalgias. Skin: Negative for color change and rash. Neurological: Negative for light-headedness and headaches. Physical Exam     Physical Exam  Vitals and nursing note reviewed. Constitutional:       General: She is awake. She is in acute distress. Appearance: Normal appearance. She is well-developed and normal weight. She is not ill-appearing, toxic-appearing or diaphoretic. Interventions: Face mask in place. Comments: Elderly   HENT:      Head: Normocephalic and atraumatic. Eyes:      Conjunctiva/sclera: Conjunctivae normal.      Pupils: Pupils are equal, round, and reactive to light. Cardiovascular:      Rate and Rhythm: Normal rate and regular rhythm. Pulses: Normal pulses. Heart sounds: Normal heart sounds. Pulmonary:      Effort: Pulmonary effort is normal.      Breath sounds: Normal breath sounds. Abdominal:      General: Abdomen is flat. Palpations: Abdomen is soft. Tenderness: There is abdominal tenderness in the left lower quadrant. Musculoskeletal:      Cervical back: Normal range of motion and neck supple. Skin:     General: Skin is warm and dry. Neurological:      General: No focal deficit present. Mental Status: She is alert and oriented to person, place, and time. GCS: GCS eye subscore is 4. GCS verbal subscore is 5. GCS motor subscore is 6. Psychiatric:         Mood and Affect: Mood and affect normal.         Behavior: Behavior normal. Behavior is cooperative. Thought Content: Thought content normal.         Lab and Diagnostic Study Results     Labs -     Recent Results (from the past 12 hour(s))   CBC WITH AUTOMATED DIFF    Collection Time: 09/06/21 12:00 PM   Result Value Ref Range    WBC 20.7 (H) 4.6 - 13.2 K/uL    RBC 3.42 (L) 4.20 - 5.30 M/uL    HGB 10.8 (L) 12.0 - 16.0 g/dL    HCT 33.1 (L) 35.0 - 45.0 %    MCV 96.8 78.0 - 100.0 FL    MCH 31.6 24.0 - 34.0 PG    MCHC 32.6 31.0 - 37.0 g/dL    RDW 13.5 11.6 - 14.5 %    PLATELET 727 058 - 072 K/uL    MPV 9.1 (L) 9.2 - 11.8 FL    NRBC 0.0 0.0  WBC    ABSOLUTE NRBC 0.00 0.00 - 0.01 K/uL    NEUTROPHILS 89 (H) 40 - 73 %    LYMPHOCYTES 11 (L) 21 - 52 %    MONOCYTES 0 (L) 3 - 10 %    EOSINOPHILS 0 0 - 5 %    BASOPHILS 0 0 - 2 %    IMMATURE GRANULOCYTES 0 %    ABS. NEUTROPHILS 18.4 (H) 1.8 - 8.0 K/UL    ABS. LYMPHOCYTES 2.3 0.9 - 3.6 K/UL    ABS. MONOCYTES 0.0 (L) 0.05 - 1.2 K/UL    ABS. EOSINOPHILS 0.0 0.0 - 0.4 K/UL    ABS. BASOPHILS 0.0 0.0 - 0.1 K/UL    ABS. IMM.  GRANS. 0.0 K/UL    DF AUTOMATED      RBC COMMENTS Normocytic, Normochromic     METABOLIC PANEL, COMPREHENSIVE    Collection Time: 09/06/21 12:00 PM   Result Value Ref Range    Sodium 140 135 - 145 mmol/L    Potassium 3.1 (L) 3.2 - 5.1 mmol/L    Chloride 105 94 - 111 mmol/L    CO2 25 21 - 33 mmol/L    Anion gap 10 mmol/L    Glucose 125 (H) 70 - 110 mg/dL    BUN 8 (L) 9 - 21 mg/dL    Creatinine 0.50 (L) 0.70 - 1.20 mg/dL    BUN/Creatinine ratio 16      GFR est AA >60 ml/min/1.73m2    GFR est non-AA >60 ml/min/1.73m2    Calcium 9.0 8.5 - 10.5 mg/dL    Bilirubin, total 0.5 0.2 - 1.0 mg/dL    AST (SGOT) 28 14 - 74 U/L    ALT (SGPT) 19 3 - 52 U/L    Alk. phosphatase 59 38 - 126 U/L    Protein, total 6.4 6.1 - 8.4 g/dL    Albumin 2.9 (L) 3.5 - 4.7 g/dL    Globulin 3.5 g/dL    A-G Ratio 0.8         Radiologic Studies -   @lastxrresult@  CT Results  (Last 48 hours)               09/06/21 1336  CT ABD PELV W CONT Final result    Impression:          1. Severe distal colitis involving rectum and roughly half of the sigmoid colon. There is also extensive sigmoid diverticulosis. 2. 4.6 cm mass in the right lower lobe with surrounding consolidation. This   should be considered to represent lung carcinoma until proven otherwise. 3. Mild hepatic steatosis. Narrative:  EXAM: CT ABD PELV W CONT       CLINICAL INDICATION/HISTORY:  abd pain.     > Additional: None       COMPARISON: 8/28/2020     > Correlative imaging: None. TECHNIQUE: Axial CT imaging of the abdomen and pelvis was performed with   intravenous contrast. Multiplanar reformats were generated. One or more dose   reduction techniques were used on this CT: automated exposure control,   adjustment of the mAs and/or kVp according to patient size, and iterative   reconstruction techniques. The specific techniques used on this CT exam have   been documented in the patient's electronic medical record. Digital imaging and   communications in medicine (DICOM) format image data are available to   nonaffiliated external healthcare facilities or entities on a secure, media   free, reciprocally searchable basis with patient authorization for at least a 12   month period after this study. _______________       FINDINGS:       LOWER CHEST: Interval development of a mass in the right lower lobe with   surrounding consolidation, proximally 4.6 x 4.3 cm. There is considerable   bilateral lung base scarring and emphysema.  Cardiac pacemaker in situ.       LIVER, BILIARY:      > Liver: Mildly diminished in attenuation. Focally diminished density in   segment 4, characteristic of steatosis. No definite liver masses. Incidentally   noted small perfusional variant in the anterior left lobe.     > Biliary: Unremarkable. SPLEEN: Unremarkable. PANCREAS: Unremarkable. ADRENALS: Unremarkable. KIDNEYS/URETERS/BLADDER: Small simple cysts bilaterally requiring no additional   follow-up. Urinary bladder largely secured by streak artifact from the left hip. PELVIC ORGANS: Probable hysterectomy, obscured. GASTROINTESTINAL TRACT: Extensive diverticulosis of the sigmoid colon. There is   a long segment of marked bowel wall thickening beginning in the mid sigmoid in   reaching all the way to the rectum, with marked surrounding inflammation,   consistent with colitis. Remaining large bowel unremarkable. Small bowel loops   are unremarkable. VASCULATURE: Moderate aortoiliac atherosclerosis without aneurysm. LYMPH NODES: No enlarged lymph nodes. OTHER: Small right inguinal hernia containing a knuckle of small bowel. No   obstruction. MUSCULOSKELETAL: Left hip arthroplasty with severe streak artifact. Marked   dextrorotoscoliosis of the lumbar spine.       _______________               CXR Results  (Last 48 hours)    None            Medical Decision Making   - I am the first provider for this patient. - I reviewed the vital signs, available nursing notes, past medical history, past surgical history, family history and social history. - Initial assessment performed. The patients presenting problems have been discussed, and they are in agreement with the care plan formulated and outlined with them. I have encouraged them to ask questions as they arise throughout their visit. Vital Signs-Reviewed the patient's vital signs.   Patient Vitals for the past 12 hrs:   Temp Pulse Resp BP SpO2   09/06/21 1203 98.2 °F (36.8 °C) 83 20 (!) 140/81 93 %       Records Reviewed: Nursing Notes and Old Medical Records    The patient presents with abdominal pain with a differential diagnosis of abdominal pain, diverticulitis, renal Colic, UTI and intra-abdominal abscess      ED Course:          Provider Notes (Medical Decision Making):     Patient is an 59-year-old female presented with lower abdominal pain. She is currently on Augmentin for diverticulitis. She was at [facility] in [place] for the same. Her white count was 20. She had a temp 100.7 per EMS. Her vitals are otherwise stable. CT showed extensive severe colitis. There is no abscess or perforation. She can be switched over to Flagyl and Levaquin. She can be admitted to inpatient medical.  Talk to the hospitalist who will admit the patient. MDM       Procedures   Medical Decision Makingedical Decision Making  Performed by: Oneyda Iqbal MD  PROCEDURES:  Procedures       Disposition   Disposition:    Admitted        Diagnosis     Clinical Impression:   1. Noninfectious gastroenteritis, unspecified type        Attestations:    Oneyda Iqbal MD    Please note that this dictation was completed with [employer], the computer voice recognition software. Quite often unanticipated grammatical, syntax, homophones, and other interpretive errors are inadvertently transcribed by the computer software. Please disregard these errors. Please excuse any errors that have escaped final proofreading. Thank you.

## 2021-09-06 NOTE — PROGRESS NOTES
Assumed care of pt    1911- Assessed pt, pt c/o abdominal pain and headache. PRN Tylenol given per pt request for headache. CBWR.     0368- Scheduled medications given per MAR. Pt states she \"feels clammy, like when I have a fever\", oral temperature is 97.2 at this time. Turned down thermostat in room. CBWR.      2300- placed pt on 2L of humidified oxygen per pt request to maintain her normal sleep routine. CBWR.     3017- rounded on pt, VSS, no voiced concerns at this time. CBWR     0425- in with pt to get up to go the bathroom, pt had a small amount of mucous like stool and unmeasureable amount of urine. VSS, pt back to bed, no other voiced concerns at this time, CBWR.

## 2021-09-06 NOTE — ASSESSMENT & PLAN NOTE
-incidental finding  -CT: 4.6 cm mass in the right lower lobe with surrounding consolidation.  This should be considered to represent lung carcinoma until proven otherwise.  -will consult pulmonologist, with possible outpatient follow-up  -patient is stable

## 2021-09-06 NOTE — H&P
History and Physical    Subjective:     Rob Torres is a 80 y.o. elderly  female with a past medical history for COPD nocturnal oxygen dependent, paroxsymal atrial fib, hypertension, hyperlipidemia, diastolic congestive heart failure, coronary artery disease, AICD, and recently diagnosed with diverticulitis. Patient presents to the ED with a chief complaint of abdominal pain. Patient states that abdominal pain has been ongoing since her last admission at [facility]. She complains of generalized abdominal pain, but mostly to LLQ that has been ongoing for the last 2 weeks, she reports intermittent sharp pain, but a continuous dull ache, nothing has relieved the pain, but palpation aggravates the pain, she is rating the pain 8/10. Patient was admitted on 8/28/2021 at [facility] for diagnosis of diverticulitis, there she was started on IV Zosyn and transition to p.o. Augmentin. Other accompanying symptoms that the patient complained of is fever, chills, weakness, lightheaded, headaches, nausea, mucoid stools, and shortness of breath that is chronic. Patient denies chest pain, palpitations, cough, vomiting, diarrhea and constipation. In the ED WBC 20.7, H&H 10.8/33.1, potassium 3.1, albumin 2.9, CT of the abdomen showing severe distal colitis involving the rectum and half of the sigmoid colon, 4.6 cm mass in her right lower lobe with surrounding consolidation. In the ED patient received 2 L of IV fluids, 3.375 of Zosyn, 50 mg of Toradol via IV, and 500 mg of Flagyl via IV. Admit to medical surgery unit. Patient assessed in the ED, at the bedside, patient is alert and oriented, there is no acute distress noted. Patient agrees to admission for a diagnosis of colitis, treatment to include IV antibiotics, gentle hydration, and pulmonology consult due to incidental finding of a right lower lobe lung mass.     Past Medical History:   Diagnosis Date    Arthritis     Asthma     CAD (coronary artery disease)     Heart disease     High cholesterol     Hyperlipidemia       Past Surgical History:   Procedure Laterality Date    HX APPENDECTOMY      HX COLONOSCOPY  01/26/2015    per gi no further colonoscopy needed due to age of the patient, see note 1/26/15    HX HERNIA REPAIR Left 2001    inguinal    HX HYSTERECTOMY      SC CARDIAC SURG PROCEDURE UNLIST  2007    defibrillator placed     Family History   Problem Relation Age of Onset    Emphysema Mother       Social History     Tobacco Use    Smoking status: Former Smoker    Smokeless tobacco: Never Used   Substance Use Topics    Alcohol use: Not Currently       Prior to Admission medications    Medication Sig Start Date End Date Taking? Authorizing Provider   tamsulosin (Flomax) 0.4 mg capsule Take 0.4 mg by mouth daily. Yes Provider, Historical   albuterol (PROVENTIL HFA, VENTOLIN HFA, PROAIR HFA) 90 mcg/actuation inhaler Take 2 Puffs by inhalation every six (6) hours as needed for Wheezing. Indications: chronic obstructive pulmonary disease 5/11/21  Yes Keiry Browning MD   promethazine (PHENERGAN) 25 mg tablet Take 1 Tab by mouth every twelve (12) hours as needed for Nausea. 4/19/21  Yes Keiry Browning MD   lisinopriL (PRINIVIL, ZESTRIL) 20 mg tablet Take 20 mg by mouth daily. Yes Provider, Historical   famotidine (PEPCID) 20 mg tablet Take 20 mg by mouth two (2) times a day. Yes Provider, Historical   Linzess 72 mcg cap capsule Take 1 capsule by mouth once daily for 90 days 3/2/21  Yes Luke Kirkpatrick MD   fluticasone propionate [facility] ALLERGY RELIEF NA) 1 [place] by Nasal route. Yes Provider, Historical   Oxygen 2 Liters at night   Yes Provider, Historical   apixaban (ELIQUIS) 2.5 mg tablet Take 2.5 mg by mouth two (2) times a day. 6/3/20  Yes Provider, Historical   simvastatin (ZOCOR) 20 mg tablet Take 20 mg by mouth nightly.  6/3/20  Yes Provider, Historical   calcium citrate-vitamin d3 (CITRACAL+D) 315 mg-5 mcg (200 unit) tab Take 1 Tab by mouth daily (with breakfast). Yes Provider, Historical   Omega-3 Fatty Acids 60- mg cpDR Take  by mouth. Yes Provider, Historical   multivitamin (ONE A DAY) tablet Take 1 Tab by mouth daily. Yes Provider, Historical   polyethylene glycol (MIRALAX) 17 gram/dose powder Take 17 g by mouth daily. Yes Provider, Historical   furosemide (LASIX) 20 mg tablet Take 40 mg by mouth daily. Yes Provider, Historical   carvediloL (COREG) 6.25 mg tablet Take 3.125 mg by mouth two (2) times daily (with meals). Yes Provider, Historical   lidocaine (LIDODERM) 5 % Apply patch to the affected area for 12 hours a day and remove for 12 hours a day. 4/19/21   Heinz Phalen., MD   arformoteroL (BROVANA) 15 mcg/2 mL nebu neb solution 15 mcg by Nebulization route. Provider, Historical   cetirizine (ZYRTEC) 5 mg tablet Take 5 mg by mouth daily. Provider, Historical   denosumab (Prolia) 60 mg/mL injection 60 mg by SubCUTAneous route. Provider, Historical   levocetirizine (XYZAL) 5 mg tablet Take 5 mg by mouth daily. Provider, Historical   amLODIPine (NORVASC) 5 mg tablet Take 1 Tab by mouth daily. 1/25/21   Heinz Phalen., MD   methocarbamoL (ROBAXIN) 500 mg tablet Take 1 Tab by mouth four (4) times daily. 1/25/21   Heinz Phalen., MD   cyanocobalamin (VITAMIN B12) 500 mcg tablet Take 500 mcg by mouth daily.     Provider, Historical     Allergies   Allergen Reactions    Codeine Nausea and Vomiting          REVIEW OF SYSTEMS:       Total of 12 systems reviewed as follows:    Positive = Red  Constitutional: Positive for malaise/fatigue and weakness and fever and chills   HENT: Negative for ear pain, positive for lightheaded and headaches, negative for loss of sense of taste and smell   Eyes: Negative for blurred vision and double vision   Skin: Negative for itching, negative for open areas   Cardiovascular: Negative for chest pain, palpitations, negative for swelling   Respiratory: Positive for shortness or breath, negative for cough, negative for sputum production   Gastrointestinal: Positive for abdominal pain and nausea, constipation, vomiting, and diarrhea   Genitourinary: Negative for dysuria, frequency, and hematuria   Musculoskeletal: Negative for joint pain and myalgias   Neurological: Negative for seizures   Psychiatric: Negative for depression and anxiousness        Objective:   VITALS:    Visit Vitals  BP (!) 157/78   Pulse 98   Temp 98.2 °F (36.8 °C)   Resp 20   Ht 5' 3\" (1.6 m)   Wt 58.5 kg (129 lb)   SpO2 94%   BMI 22.85 kg/m²       PHYSICAL EXAM:  Positive = Red  Constitutional: Alert and oriented x 3 and no noted acute distress appears to be stated age, ill-appearing elderly female   HENT: Atraumatic, nose midline, oropharynx clear ad moist, trachea midline, no supraclavicular   Eyes: Conjunctiva normal and pupils equal   Skin: Dry, intact, warm, and dry   Cardiovascular: Regular rate and rhythm, normal heart sounds, no murmurs, pulses palpable, no noted edema   Respiratory: Lungs clear throughout, no wheezes, rales, or rhonchi, effort normal   Gastrointestinal: Appearance normal, bowel sounds are normal, abdominal tenderness in her left lower quadrant   Genitourinary: Deferred   Musculoskeletal: Normal ROM   Neurological: Alert and oriented x 3, awake. No facial droop. No slurred speech. Hand grasps equal. Strength 5/5 in all extremities.  Intact sensations   Psychiatric: Affect normal, Answers questions appropriately     __________________________________________________  Care Plan discussed with:    Comments   Patient X    Family      RN     Care Manager                    Consultant:      _______________________________________________________________________  Expected  Disposition:   Home with Family X   HH/PT/OT/RN    SNF/LTC    TAMRA    ________________________________________________________________________    Labs:  Recent Results (from the past 24 hour(s))   CBC WITH AUTOMATED DIFF    Collection Time: 09/06/21 12:00 PM   Result Value Ref Range    WBC 20.7 (H) 4.6 - 13.2 K/uL    RBC 3.42 (L) 4.20 - 5.30 M/uL    HGB 10.8 (L) 12.0 - 16.0 g/dL    HCT 33.1 (L) 35.0 - 45.0 %    MCV 96.8 78.0 - 100.0 FL    MCH 31.6 24.0 - 34.0 PG    MCHC 32.6 31.0 - 37.0 g/dL    RDW 13.5 11.6 - 14.5 %    PLATELET 253 319 - 145 K/uL    MPV 9.1 (L) 9.2 - 11.8 FL    NRBC 0.0 0.0  WBC    ABSOLUTE NRBC 0.00 0.00 - 0.01 K/uL    NEUTROPHILS 89 (H) 40 - 73 %    LYMPHOCYTES 11 (L) 21 - 52 %    MONOCYTES 0 (L) 3 - 10 %    EOSINOPHILS 0 0 - 5 %    BASOPHILS 0 0 - 2 %    IMMATURE GRANULOCYTES 0 %    ABS. NEUTROPHILS 18.4 (H) 1.8 - 8.0 K/UL    ABS. LYMPHOCYTES 2.3 0.9 - 3.6 K/UL    ABS. MONOCYTES 0.0 (L) 0.05 - 1.2 K/UL    ABS. EOSINOPHILS 0.0 0.0 - 0.4 K/UL    ABS. BASOPHILS 0.0 0.0 - 0.1 K/UL    ABS. IMM. GRANS. 0.0 K/UL    DF AUTOMATED      RBC COMMENTS Normocytic, Normochromic     METABOLIC PANEL, COMPREHENSIVE    Collection Time: 09/06/21 12:00 PM   Result Value Ref Range    Sodium 140 135 - 145 mmol/L    Potassium 3.1 (L) 3.2 - 5.1 mmol/L    Chloride 105 94 - 111 mmol/L    CO2 25 21 - 33 mmol/L    Anion gap 10 mmol/L    Glucose 125 (H) 70 - 110 mg/dL    BUN 8 (L) 9 - 21 mg/dL    Creatinine 0.50 (L) 0.70 - 1.20 mg/dL    BUN/Creatinine ratio 16      GFR est AA >60 ml/min/1.73m2    GFR est non-AA >60 ml/min/1.73m2    Calcium 9.0 8.5 - 10.5 mg/dL    Bilirubin, total 0.5 0.2 - 1.0 mg/dL    AST (SGOT) 28 14 - 74 U/L    ALT (SGPT) 19 3 - 52 U/L    Alk.  phosphatase 59 38 - 126 U/L    Protein, total 6.4 6.1 - 8.4 g/dL    Albumin 2.9 (L) 3.5 - 4.7 g/dL    Globulin 3.5 g/dL    A-G Ratio 0.8     MAGNESIUM    Collection Time: 09/06/21 12:00 PM   Result Value Ref Range    Magnesium 1.9 1.7 - 2.8 mg/dL       Imaging:  CT ABD PELV W CONT    Result Date: 9/6/2021  EXAM: CT ABD PELV W CONT CLINICAL INDICATION/HISTORY:  abd pain.   > Additional: None COMPARISON: 8/28/2020   > Correlative imaging: None. TECHNIQUE: Axial CT imaging of the abdomen and pelvis was performed with intravenous contrast. Multiplanar reformats were generated. One or more dose reduction techniques were used on this CT: automated exposure control, adjustment of the mAs and/or kVp according to patient size, and iterative reconstruction techniques. The specific techniques used on this CT exam have been documented in the patient's electronic medical record. Digital imaging and communications in medicine (DICOM) format image data are available to nonaffiliated external healthcare facilities or entities on a secure, media free, reciprocally searchable basis with patient authorization for at least a 12 month period after this study. _______________ FINDINGS: LOWER CHEST: Interval development of a mass in the right lower lobe with surrounding consolidation, proximally 4.6 x 4.3 cm. There is considerable bilateral lung base scarring and emphysema. Cardiac pacemaker in situ. LIVER, BILIARY:   > Liver: Mildly diminished in attenuation. Focally diminished density in segment 4, characteristic of steatosis. No definite liver masses. Incidentally noted small perfusional variant in the anterior left lobe.   > Biliary: Unremarkable. SPLEEN: Unremarkable. PANCREAS: Unremarkable. ADRENALS: Unremarkable. KIDNEYS/URETERS/BLADDER: Small simple cysts bilaterally requiring no additional follow-up. Urinary bladder largely secured by streak artifact from the left hip. PELVIC ORGANS: Probable hysterectomy, obscured. GASTROINTESTINAL TRACT: Extensive diverticulosis of the sigmoid colon. There is a long segment of marked bowel wall thickening beginning in the mid sigmoid in reaching all the way to the rectum, with marked surrounding inflammation, consistent with colitis. Remaining large bowel unremarkable. Small bowel loops are unremarkable. VASCULATURE: Moderate aortoiliac atherosclerosis without aneurysm. LYMPH NODES: No enlarged lymph nodes.  OTHER: Small right inguinal hernia containing a knuckle of small bowel. No obstruction. MUSCULOSKELETAL: Left hip arthroplasty with severe streak artifact. Marked dextrorotoscoliosis of the lumbar spine. _______________     1. Severe distal colitis involving rectum and roughly half of the sigmoid colon. There is also extensive sigmoid diverticulosis. 2. 4.6 cm mass in the right lower lobe with surrounding consolidation. This should be considered to represent lung carcinoma until proven otherwise. 3. Mild hepatic steatosis. Assessment & Plan:     Abdominal pain  -likely secondary to colitis  -gentle hydration  -empiric antibiotics  -Pain management with Toradol orally  -BMP and CBC in am    Colitis  -abdominal pain, recent antibiotic treatment for diverticulitis, no improvement  -WBC 20.7  -CT abdomen: Severe distal colitis involving rectum and roughly half of the sigmoid colon. There is also extensive sigmoid diverticulosis. -NPO except for meds, advance as tolerated in a next 24 hours  -pain mgt Toradol  -antibiotics (Flagyl and Levofloxacin)  -clear liquids in 24 hours, advance as tolerated  -when tolerating diet switch to PO antibiotics  -IV hydration  -BC and BMP in a.m. Lung mass  -incidental finding  -CT: 4.6 cm mass in the right lower lobe with surrounding consolidation.  This should be considered to represent lung carcinoma until proven otherwise.  -will consult pulmonologist, with possible outpatient follow-up  -patient is stable     Hypokalemia  -potassium 3.1, oral replacement given 20 MEQ x dose   -Magnesium ordered  -daily BMP     Hyperlipidemia  -Chronic  -continue statin    Essential hypertension  -chronic/controlled  -continue Norvasc, Lisnopril, and coreg  -monitor BP closely    Gastroesophageal reflux disease  -continue Pepcid    Congestive heart failure (CHF) (HCC)  -diastolic  -continue Coreg and Lasix  -monitor closely for fluid overload    Chronic obstructive lung disease (HCC)  -nocturnal oxygen dependent (2 LPM via NC)  -sttable  -continue PRN Albuterol    Urinary retention  -continue Flomax    Paroxysmal atrial fibrillation (HCC)  -currently on LTOA (eliquis)  -continue Eliquis  -cardiac monitoring continuous    Disposition. 2-3 midnight stay    TOTAL TIME:  45 Minutes    Code Status: DNR    Prophylaxis:  Eliquis     Electronically Signed : Omar Grossman, [facility]    Please note that this dictation was completed with [employer], the computer voice recognition software. Quite often unanticipated grammatical, syntax, homophones, and other interpretive errors are inadvertently transcribed by the computer software. Please disregard these errors. Please excuse any errors that have escaped final proofreading. Thank you.

## 2021-09-06 NOTE — ED NOTES
Pt is admitted to hospital.  [facility] by RN with fluids.

## 2021-09-07 ENCOUNTER — APPOINTMENT (OUTPATIENT)
Dept: CT IMAGING | Age: 86
DRG: 392 | End: 2021-09-07
Attending: NURSE PRACTITIONER
Payer: MEDICARE

## 2021-09-07 LAB
ANION GAP SERPL CALC-SCNC: 8 MMOL/L
BUN SERPL-MCNC: 12 MG/DL (ref 9–21)
BUN/CREAT SERPL: 15
CA-I BLD-MCNC: 7.8 MG/DL (ref 8.5–10.5)
CHLORIDE SERPL-SCNC: 109 MMOL/L (ref 94–111)
CO2 SERPL-SCNC: 22 MMOL/L (ref 21–33)
CREAT SERPL-MCNC: 0.8 MG/DL (ref 0.7–1.2)
ERYTHROCYTE [DISTWIDTH] IN BLOOD BY AUTOMATED COUNT: 13.9 % (ref 11.6–14.5)
GLUCOSE SERPL-MCNC: 83 MG/DL (ref 70–110)
HCT VFR BLD AUTO: 32.4 % (ref 35–45)
HGB BLD-MCNC: 10.4 G/DL (ref 12–16)
MAGNESIUM SERPL-MCNC: 1.7 MG/DL (ref 1.7–2.8)
MCH RBC QN AUTO: 31.2 PG (ref 24–34)
MCHC RBC AUTO-ENTMCNC: 32.1 G/DL (ref 31–37)
MCV RBC AUTO: 97.3 FL (ref 78–100)
NRBC # BLD: 0 K/UL (ref 0–0.01)
NRBC BLD-RTO: 0 PER 100 WBC
PLATELET # BLD AUTO: 313 K/UL (ref 135–420)
PMV BLD AUTO: 9.4 FL (ref 9.2–11.8)
POTASSIUM SERPL-SCNC: 3.2 MMOL/L (ref 3.2–5.1)
RBC # BLD AUTO: 3.33 M/UL (ref 4.2–5.3)
SODIUM SERPL-SCNC: 139 MMOL/L (ref 135–145)
WBC # BLD AUTO: 18.8 K/UL (ref 4.6–13.2)

## 2021-09-07 PROCEDURE — 36415 COLL VENOUS BLD VENIPUNCTURE: CPT

## 2021-09-07 PROCEDURE — 74011250636 HC RX REV CODE- 250/636: Performed by: NURSE PRACTITIONER

## 2021-09-07 PROCEDURE — 94761 N-INVAS EAR/PLS OXIMETRY MLT: CPT

## 2021-09-07 PROCEDURE — 74011000636 HC RX REV CODE- 636: Performed by: INTERNAL MEDICINE

## 2021-09-07 PROCEDURE — 71260 CT THORAX DX C+: CPT

## 2021-09-07 PROCEDURE — 74011250637 HC RX REV CODE- 250/637: Performed by: NURSE PRACTITIONER

## 2021-09-07 PROCEDURE — 80048 BASIC METABOLIC PNL TOTAL CA: CPT

## 2021-09-07 PROCEDURE — 99222 1ST HOSP IP/OBS MODERATE 55: CPT | Performed by: INTERNAL MEDICINE

## 2021-09-07 PROCEDURE — 85027 COMPLETE CBC AUTOMATED: CPT

## 2021-09-07 PROCEDURE — 65270000029 HC RM PRIVATE

## 2021-09-07 PROCEDURE — 83735 ASSAY OF MAGNESIUM: CPT

## 2021-09-07 PROCEDURE — 77010033678 HC OXYGEN DAILY

## 2021-09-07 RX ORDER — SODIUM CHLORIDE 0.9 % (FLUSH) 0.9 %
5-10 SYRINGE (ML) INJECTION
Status: COMPLETED | OUTPATIENT
Start: 2021-09-07 | End: 2021-09-07

## 2021-09-07 RX ADMIN — Medication 10 ML: at 17:38

## 2021-09-07 RX ADMIN — Medication 10 ML: at 07:11

## 2021-09-07 RX ADMIN — METRONIDAZOLE 500 MG: 500 INJECTION, SOLUTION INTRAVENOUS at 14:30

## 2021-09-07 RX ADMIN — Medication 1 TABLET: at 10:30

## 2021-09-07 RX ADMIN — FAMOTIDINE 20 MG: 20 TABLET, FILM COATED ORAL at 17:33

## 2021-09-07 RX ADMIN — Medication 10 ML: at 14:55

## 2021-09-07 RX ADMIN — METRONIDAZOLE 500 MG: 500 INJECTION, SOLUTION INTRAVENOUS at 21:14

## 2021-09-07 RX ADMIN — Medication 10 ML: at 21:17

## 2021-09-07 RX ADMIN — APIXABAN 2.5 MG: 2.5 TABLET, FILM COATED ORAL at 17:33

## 2021-09-07 RX ADMIN — FLUTICASONE PROPIONATE 1 SPRAY: 50 SPRAY, METERED NASAL at 10:30

## 2021-09-07 RX ADMIN — TAMSULOSIN HYDROCHLORIDE 0.4 MG: 0.4 CAPSULE ORAL at 10:30

## 2021-09-07 RX ADMIN — TRAMADOL HYDROCHLORIDE 50 MG: 50 TABLET, FILM COATED ORAL at 23:52

## 2021-09-07 RX ADMIN — AMLODIPINE BESYLATE 5 MG: 5 TABLET ORAL at 10:30

## 2021-09-07 RX ADMIN — CARVEDILOL 3.12 MG: 3.12 TABLET, FILM COATED ORAL at 17:34

## 2021-09-07 RX ADMIN — ACETAMINOPHEN 650 MG: 325 TABLET ORAL at 12:54

## 2021-09-07 RX ADMIN — FAMOTIDINE 20 MG: 20 TABLET, FILM COATED ORAL at 10:30

## 2021-09-07 RX ADMIN — THERA TABS 1 TABLET: TAB at 10:30

## 2021-09-07 RX ADMIN — APIXABAN 2.5 MG: 2.5 TABLET, FILM COATED ORAL at 10:30

## 2021-09-07 RX ADMIN — CARVEDILOL 3.12 MG: 3.12 TABLET, FILM COATED ORAL at 10:30

## 2021-09-07 RX ADMIN — ATORVASTATIN CALCIUM 20 MG: 10 TABLET, FILM COATED ORAL at 21:14

## 2021-09-07 RX ADMIN — IOPAMIDOL 97 ML: 755 INJECTION, SOLUTION INTRAVENOUS at 14:58

## 2021-09-07 RX ADMIN — METRONIDAZOLE 500 MG: 500 INJECTION, SOLUTION INTRAVENOUS at 06:29

## 2021-09-07 NOTE — CONSULTS
Gastroenterology Consult Note       Primary GI Physician: Dominique Serrano MD    Referring Provider: Hospitalist team    Consult Date:  2021    Admit Date:  2021    Chief Complaint: Abdominal pain and abnormal CT scan. Subjective:     History of Present Illness:  Patient is a 80 y.o. female who is seen in consultation for evaluation for abdominal pain and abnormal CT scan. On 2021, the patient underwent CT scan of the abdomen pelvis upon admission to [address] [place] [address].  This revealed the followin. Acute sigmoid diverticulitis with intramural abscesses. No intraperitoneal abscess or free intraperitoneal perforation. She was admitted and treated with IV antibiotics because of abdominal pain and the above finding. On 2021, she had repeat CT scan which revealed the following:  Sigmoid diverticulitis. Previously suspected intramural abscess is no longer present. In addition, paracolic fat stranding has improved slightly. Patient was discharged home on oral antibiotics. She came the emergency room yesterday because of current abdominal pain. She has had no rectal bleeding although this has been an occasional problem in the past due to her known irritable bowel syndrome with alternating constipation diarrhea, predominantly constipation. She had CT scan of the abdomen and pelvis 21 in the emergency room here which revealed the following: Diffuse severe diverticulosis especially in the sigmoid colon, with severe colitis involving roughly half the distal sigmoid colon and the rectum. She was treated initially with IV piperacillin, and is now on IV Levaquin and metronidazole. CT scan also showed a 4.6 cm mass in the right lower lobe with surrounding consolidation suspicious for lung cancer. The patient is a regular patient of [facility]. She was actually seen by Dr. Wes Sepulveda 2021 for routine follow-up.   At that time she treated her for her known irritable bowel syndrome with constipation alternating with diarrhea. This includes use of Linzess 72 mcg a day and as needed MiraLAX. Also given dicyclomine. Review of the records shows that the patient underwent colonoscopy in 2015 which was incomplete because of severe diverticulosis in the sigmoid colon as well as internal and internal hemorrhoids. Past medical history is significant for coronary artery disease, history of congestive heart failure, history of COPD and asthma, and use of Eliquis for anticoagulation. PMH:  Past Medical History:   Diagnosis Date    Arthritis     Asthma     CAD (coronary artery disease)     Heart disease     High cholesterol     Hyperlipidemia        PSH:  Past Surgical History:   Procedure Laterality Date    HX APPENDECTOMY      HX COLONOSCOPY  01/26/2015    per gi no further colonoscopy needed due to age of the patient, see note 1/26/15    HX HERNIA REPAIR Left 2001    inguinal    HX HYSTERECTOMY      ND CARDIAC SURG PROCEDURE UNLIST  2007    defibrillator placed       Allergies: Allergies   Allergen Reactions    Codeine Nausea and Vomiting       Home Medications:  Prior to Admission medications    Medication Sig Start Date End Date Taking? Authorizing Provider   tamsulosin (Flomax) 0.4 mg capsule Take 0.4 mg by mouth daily. Yes Provider, Historical   albuterol (PROVENTIL HFA, VENTOLIN HFA, PROAIR HFA) 90 mcg/actuation inhaler Take 2 Puffs by inhalation every six (6) hours as needed for Wheezing. Indications: chronic obstructive pulmonary disease 5/11/21  Yes Gila Sepulveda MD   promethazine (PHENERGAN) 25 mg tablet Take 1 Tab by mouth every twelve (12) hours as needed for Nausea. 4/19/21  Yes Gila Sepulveda MD   lisinopriL (PRINIVIL, ZESTRIL) 20 mg tablet Take 20 mg by mouth daily. Yes Provider, Historical   famotidine (PEPCID) 20 mg tablet Take 20 mg by mouth two (2) times a day.    Yes Provider, Historical   Linzess 72 mcg cap capsule Take 1 capsule by mouth once daily for 90 days 3/2/21  Yes Payam Marshall MD   fluticasone propionate [facility] ALLERGY RELIEF NA) 1 [place] by Nasal route. Yes Provider, Historical   Oxygen 2 Liters at night   Yes Provider, Historical   apixaban (ELIQUIS) 2.5 mg tablet Take 2.5 mg by mouth two (2) times a day. 6/3/20  Yes Provider, Historical   simvastatin (ZOCOR) 20 mg tablet Take 20 mg by mouth nightly. 6/3/20  Yes Provider, Historical   calcium citrate-vitamin d3 (CITRACAL+D) 315 mg-5 mcg (200 unit) tab Take 1 Tab by mouth daily (with breakfast). Yes Provider, Historical   Omega-3 Fatty Acids 60- mg cpDR Take  by mouth. Yes Provider, Historical   multivitamin (ONE A DAY) tablet Take 1 Tab by mouth daily. Yes Provider, Historical   polyethylene glycol (MIRALAX) 17 gram/dose powder Take 17 g by mouth daily. Yes Provider, Historical   furosemide (LASIX) 20 mg tablet Take 40 mg by mouth daily. Yes Provider, Historical   carvediloL (COREG) 6.25 mg tablet Take 3.125 mg by mouth two (2) times daily (with meals). Yes Provider, Historical   lidocaine (LIDODERM) 5 % Apply patch to the affected area for 12 hours a day and remove for 12 hours a day. 4/19/21   Tiburcio Gupta MD   arformoteroL (BROVANA) 15 mcg/2 mL nebu neb solution 15 mcg by Nebulization route. Provider, Historical   cetirizine (ZYRTEC) 5 mg tablet Take 5 mg by mouth daily. Provider, Historical   denosumab (Prolia) 60 mg/mL injection 60 mg by SubCUTAneous route. Provider, Historical   levocetirizine (XYZAL) 5 mg tablet Take 5 mg by mouth daily. Provider, Historical   amLODIPine (NORVASC) 5 mg tablet Take 1 Tab by mouth daily. 1/25/21   Tiburcio Gupta MD   methocarbamoL (ROBAXIN) 500 mg tablet Take 1 Tab by mouth four (4) times daily. 1/25/21   Tiburcio Gupta MD   cyanocobalamin (VITAMIN B12) 500 mcg tablet Take 500 mcg by mouth daily.     Provider, Historical       Hospital Medications:  Current Facility-Administered Medications Medication Dose Route Frequency    levoFLOXacin (LEVAQUIN) 750 mg in D5W IVPB  750 mg IntraVENous Q48H    sodium chloride (NS) flush 5-40 mL  5-40 mL IntraVENous Q8H    sodium chloride (NS) flush 5-40 mL  5-40 mL IntraVENous PRN    acetaminophen (TYLENOL) tablet 650 mg  650 mg Oral Q6H PRN    Or    acetaminophen (TYLENOL) suppository 650 mg  650 mg Rectal Q6H PRN    polyethylene glycol (MIRALAX) packet 17 g  17 g Oral DAILY PRN    ondansetron (ZOFRAN ODT) tablet 4 mg  4 mg Oral Q8H PRN    Or    ondansetron (ZOFRAN) injection 4 mg  4 mg IntraVENous Q6H PRN    metroNIDAZOLE (FLAGYL) IVPB premix 500 mg  500 mg IntraVENous Q8H    melatonin tablet 3 mg  3 mg Oral QHS PRN    traMADoL (ULTRAM) tablet 50 mg  50 mg Oral Q6H PRN    albuterol (PROVENTIL HFA, VENTOLIN HFA, PROAIR HFA) inhaler 2 Puff  2 Puff Inhalation Q6H PRN    amLODIPine (NORVASC) tablet 5 mg  5 mg Oral DAILY    apixaban (ELIQUIS) tablet 2.5 mg  2.5 mg Oral BID    calcium-vitamin D (OS-MOOK +D3) 250 mg-125 unit per tablet 1 Tablet  1 Tablet Oral DAILY    carvediloL (COREG) tablet 3.125 mg  3.125 mg Oral BID WITH MEALS    famotidine (PEPCID) tablet 20 mg  20 mg Oral BID    fluticasone propionate (FLONASE) 50 mcg/actuation nasal spray 1 Spray  1 Spray Both Nostrils DAILY    furosemide (LASIX) tablet 40 mg  40 mg Oral DAILY    lisinopriL (PRINIVIL, ZESTRIL) tablet 20 mg  20 mg Oral DAILY    therapeutic multivitamin (THERAGRAN) tablet 1 Tablet  1 Tablet Oral DAILY    tamsulosin (FLOMAX) capsule 0.4 mg  0.4 mg Oral DAILY    atorvastatin (LIPITOR) tablet 20 mg  20 mg Oral QHS       Social History:  Social History     Tobacco Use    Smoking status: Former Smoker    Smokeless tobacco: Never Used   Substance Use Topics    Alcohol use: Not Currently       Pt denies any history of drug use, blood transfusions.     Family History:  Family History   Problem Relation Age of Onset    Emphysema Mother        Review of Systems:  A detailed 10 system ROS is obtained, with pertinent positives as listed above. All others are negative. Diet:      Objective:     Physical Exam:  Vitals:  Visit Vitals  BP (!) 114/55   Pulse 75   Temp 97.2 °F (36.2 °C)   Resp 18   Ht 5' 3\" (1.6 m)   Wt 58.5 kg (129 lb)   SpO2 94%   BMI 22.85 kg/m²     Gen:  Pt is alert, cooperative, no acute distress  Skin:  Extremities and face reveal no rashes. HEENT: Sclerae anicteric. Extra-occular muscles are intact. No oral ulcers. No abnormal pigmentation of the lips. The neck is supple. Cardiovascular: Regular rate and rhythm. No murmurs, gallops, or rubs. Respiratory:  Comfortable breathing with no accessory muscle use. Clear breath sounds anteriorly with no wheezes, rales, or rhonchi. GI:  Abdomen nondistended, soft. Normal active bowel sounds. No enlargement of the liver or spleen. No masses palpable. Diffuse tenderness without rebound or guarding. Rectal:  Deferred  Musculoskeletal:  No pitting edema of the lower legs. Neurological:  Gross memory appears intact. Patient is alert and oriented. Psychiatric:  Mood appears appropriate with judgement intact. Lymphatic:  No cervical or supraclavicular adenopathy.     Laboratory:      Lab Results   Component Value Date     09/07/2021    K 3.2 09/07/2021     09/07/2021    CO2 22 09/07/2021    AGAP 8 09/07/2021    GLU 83 09/07/2021    BUN 12 09/07/2021    CREA 0.80 09/07/2021    BUCR 15 09/07/2021    GFRAA >60 09/07/2021    GFRNA >60 09/07/2021    CA 7.8 (L) 09/07/2021    TBILI 0.5 09/06/2021    AST 28 09/06/2021    ALT 19 09/06/2021    AP 59 09/06/2021    TP 6.4 09/06/2021    ALB 2.9 (L) 09/06/2021    GLOB 3.5 09/06/2021    AGRAT 0.8 09/06/2021    WBC 18.8 (H) 09/07/2021    RBC 3.33 (L) 09/07/2021    HGB 10.4 (L) 09/07/2021    HCT 32.4 (L) 09/07/2021    MCV 97.3 09/07/2021    MCH 31.2 09/07/2021    MCHC 32.1 09/07/2021    RDW 13.9 09/07/2021     09/07/2021    MPLV 9.4 09/07/2021    GRANS 89 (H) 09/06/2021 LYMPH 11 (L) 09/06/2021    MONOS 0 (L) 09/06/2021    EOS 0 09/06/2021    BASOS 0 09/06/2021    IG 0 09/06/2021    ANEU 18.4 (H) 09/06/2021    ABL 2.3 09/06/2021    ABM 0.0 (L) 09/06/2021    STEVE 0.0 09/06/2021    ABB 0.0 09/06/2021    AIG 0.0 09/06/2021   results  Lab Results   Component Value Date     09/07/2021    K 3.2 09/07/2021     09/07/2021    CO2 22 09/07/2021    AGAP 8 09/07/2021    GLU 83 09/07/2021    BUN 12 09/07/2021    CREA 0.80 09/07/2021    BUCR 15 09/07/2021    GFRAA >60 09/07/2021    GFRNA >60 09/07/2021    CA 7.8 (L) 09/07/2021    TBILI 0.5 09/06/2021    AST 28 09/06/2021    ALT 19 09/06/2021    AP 59 09/06/2021    TP 6.4 09/06/2021    ALB 2.9 (L) 09/06/2021    GLOB 3.5 09/06/2021    AGRAT 0.8 09/06/2021    WBC 18.8 (H) 09/07/2021    RBC 3.33 (L) 09/07/2021    HGB 10.4 (L) 09/07/2021    HCT 32.4 (L) 09/07/2021    MCV 97.3 09/07/2021    MCH 31.2 09/07/2021    MCHC 32.1 09/07/2021    RDW 13.9 09/07/2021     09/07/2021    MPLV 9.4 09/07/2021    GRANS 89 (H) 09/06/2021    LYMPH 11 (L) 09/06/2021    MONOS 0 (L) 09/06/2021    EOS 0 09/06/2021    BASOS 0 09/06/2021    IG 0 09/06/2021    ANEU 18.4 (H) 09/06/2021    ABL 2.3 09/06/2021    ABM 0.0 (L) 09/06/2021    STEVE 0.0 09/06/2021    ABB 0.0 09/06/2021    AIG 0.0 09/06/2021            CT Results (most recent):  Results from Hospital Encounter encounter on 09/06/21    CT CHEST W CONT    Narrative  EXAM: CT chest    CLINICAL INDICATION/HISTORY: RLL lung mass  > Additional: None. COMPARISON: None. > Reference Exam: CT abdomen one day earlier. TECHNIQUE: Axial CT imaging from the thoracic inlet through the diaphragm with  intravenous contrast. Multiplanar reformats were generated. One or more dose  reduction techniques were used on this CT: automated exposure control,  adjustment of the mAs and/or kVp according to patient size, and iterative  reconstruction techniques.   The specific techniques used on this CT exam have  been documented in the patient's electronic medical record. Digital Imaging and  Communications in Medicine (DICOM) format image data are available to  nonaffiliated external healthcare facilities or entities on a secure, media  free, reciprocally searchable basis with patient authorization for at least a  12-month period after this study. _______________    FINDINGS:    LUNGS:  > Diffuse emphysema.  > Posterior right lower lobe lung mass measures 4.1 x 4.3 cm. A large portion  of this mass is essentially low in attenuation consistent with necrosis. Adjacent pleural-based consolidation potentially postobstructive atelectasis or  infection. PLEURA: No significant pleural effusion. No pneumothorax. AIRWAY: Patent centrally. MEDIASTINUM: Normal heart size. No pericardial effusion. Great vessels  unremarkable. LYMPH NODES: Enlarged right interlobar lymph node measuring 12 mm in short axis. Enlarged right hilar lymph node measuring 12 mm in short axis. Borderline  enlarged right lower paratracheal lymph node measuring 10 mm in short axis. UPPER ABDOMEN: Hepatic steatosis. OTHER: No acute or aggressive osseous abnormalities identified. Chronic  appearing T8 or T9 compression fracture. _______________    Impression  1. Necrotic right lower lobe lung mass highly suspicious for primary lung  malignancy. Adjacent pleural-based consolidation potentially postobstructive  atelectasis or infection. 2. Metastatic lymphadenopathy to right interlobar, right hilar and potentially  right lower paratracheal lymph nodes. US Results (most recent):  No results found for this or any previous visit. MRI Results (most recent):  No results found for this or any previous visit. Assessment:     1. Abdominal pain and abnormal CT scan. The patient had documented acute sigmoid diverticulitis with associated intramural abscesses.   A subsequent CT scan done 3 days later showed some improvement with the abscesses resolving but still with inflammation present. CT scan now shows diffuse colitis from the distal sigmoid colon involving the rectum. It would be highly unusual for this to change from diverticulitis to some type of colitis. She is however at risk for ischemic colitis given her significant coronary artery disease and lung disease so this is a possibility. Blood cell count is elevated over 18,000. I suspect actually what is occurring is she has significant and known diverticulosis and developed significant diverticulitis with abscess formation. This is resolving although slowly. Is not realistic to expect quick resolution of something as significant as abscess formation with diverticulitis. This will likely take at least 2 weeks of antibiotic treatment orally when she is discharged in 4 to 6 weeks till she is feeling back to normal, to resolve if not longer. 2.  Anemia. This is likely due to bone marrow suppression from her present infection as well as phlebotomies which have been significant over the last week to 10 days. 3.  COPD. She has been on medication normally for this problem. 4.  Coronary artery disease. She is on Eliquis as well as other medications for significant coronary artery disease. It would not be surprising given her multiple medical problems and acute diverticulitis now, if she developed some ischemia localized to the sigmoid colon/upper rectum area given her known coronary artery disease. 5.  Lung mass. She should be evaluated by pulmonary disease for this problem. Plan:     1. There is no need to perform colonoscopy at this particular time. We know what she has in this area, it would not change the diagnosis or treatment, and in fact, perform a colonoscopy in the setting of acute diverticulitis increases the risk of perforation bacteremia.   If she has underlying ischemic colitis in addition to the acute diverticulitis, this will also heal conservatively over time.  2.  Continue IV antibiotics. I would fully expect this to take 2 to 4 weeks to resolve completely, if not longer. She could also develop SCAD (segmental colitis associated with diverticulosis since she has severe diverticulosis in the sigmoid colon.)  This can make treatment difficult and it would just take time and antibiotics. She already has an antispasmodic at home in the form of dicyclomine which she can take upon discharge. 3.   Upon discharge, she should be treated for a full 14 days with oral ciprofloxacin and metronidazole, each 500 mg twice a day. 4.  Future considerations if she does not improve or continues to have recurrent symptoms despite antibiotic therapy, would be referral to a surgeon as she may need sigmoid resection. Of note however, she would be a very high risk and I think this would require careful thought before proceeding with surgery. 5.  She can follow-up with her regular gastroenterologist Dr. Murphy Betancourt upon discharge, whom she was actually supposed to see him in follow-up today the patient reports. Thank you very much for this consultation.       Ailyn Perez MD

## 2021-09-07 NOTE — PROGRESS NOTES
Comprehensive Nutrition Assessment    Type and Reason for Visit: Initial    Nutrition Recommendations/Plan: clear liquids diet advance as tolerated to a cardiac diet  Provide ensure clear TID until diet can be advanced    Nutrition Assessment:  81 yo female PMH: COPD, Af-ib, HTN, HLD, CHF, CAD, recent diverticulitis   recent diverticulitis has persistant LLQ pain x 2 weeks with fever, chills, weakness, lightheadedness, nausea mucoid stools. Being treated with IVF IV ABx and pumonology consult for incidental finding of right lower lobe lung mass. Currently on clear liquids diet no FR currently does have hx of CHF with chronic SOB. Albumin 2.9, K+ 3.1 low likely due to poor intake. Pt received 20 mEq oral K+ and will start ensure clear TID for low albumin can consider changing supplement once diet is advanced. Recent Results (from the past 24 hour(s))   METABOLIC PANEL, BASIC    Collection Time: 09/07/21  5:00 AM   Result Value Ref Range    Sodium 139 135 - 145 mmol/L    Potassium 3.2 3.2 - 5.1 mmol/L    Chloride 109 94 - 111 mmol/L    CO2 22 21 - 33 mmol/L    Anion gap 8 mmol/L    Glucose 83 70 - 110 mg/dL    BUN 12 9 - 21 mg/dL    Creatinine 0.80 0.70 - 1.20 mg/dL    BUN/Creatinine ratio 15      GFR est AA >60 ml/min/1.73m2    GFR est non-AA >60 ml/min/1.73m2    Calcium 7.8 (L) 8.5 - 10.5 mg/dL   MAGNESIUM    Collection Time: 09/07/21  5:00 AM   Result Value Ref Range    Magnesium 1.7 1.7 - 2.8 mg/dL   CBC W/O DIFF    Collection Time: 09/07/21  5:00 AM   Result Value Ref Range    WBC 18.8 (H) 4.6 - 13.2 K/uL    RBC 3.33 (L) 4.20 - 5.30 M/uL    HGB 10.4 (L) 12.0 - 16.0 g/dL    HCT 32.4 (L) 35.0 - 45.0 %    MCV 97.3 78.0 - 100.0 FL    MCH 31.2 24.0 - 34.0 PG    MCHC 32.1 31.0 - 37.0 g/dL    RDW 13.9 11.6 - 14.5 %    PLATELET 541 102 - 359 K/uL    MPV 9.4 9.2 - 11.8 FL    NRBC 0.0 0.0  WBC    ABSOLUTE NRBC 0.00 0.00 - 0.01 K/uL       Malnutrition Assessment:  Malnutrition Status:   Moderate malnutrition (LLQ pain x 2 weeks related to diverticulitis)    Context:  Acute illness     Findings of the 6 clinical characteristics of malnutrition:   Energy Intake:  7 - 50% or less of est energy requirements for 5 or more days  Weight Loss:  Unable to assess     Body Fat Loss:  No significant body fat loss,     Muscle Mass Loss:  No significant muscle mass loss,    Fluid Accumulation:  No significant fluid accumulation,     Strength:  Not performed         Estimated Daily Nutrient Needs:  Energy (kcal): 8153-8650 kcal/day; Weight Used for Energy Requirements: Admission (59 kg)  Protein (g): 59-71 g/day; Weight Used for Protein Requirements: Admission (1-1.2 g/kg)  Fluid (ml/day): 9124-3332 mL/day; Method Used for Fluid Requirements: 1 ml/kcal      Nutrition Related Findings:  recent diverticulitis has persistant LLQ pain x 2 weeks with fever, chills, weakness, lightheadedness, nausea mucoid stools. Being treated with IVF IV ABx and pumonology consult for incidental finding of right lower lobe lung mass. Wounds:    None       Current Nutrition Therapies:  ADULT DIET Clear Liquid    Anthropometric Measures:  · Height:  5' 3\" (160 cm)  · Current Body Wt:  58.5 kg (129 lb)   · Admission Body Wt:  129 lb    · Usual Body Wt:        · Ideal Body Wt:  115 lbs:  112.2 %   · Adjusted Body Weight:   ; Weight Adjustment for: No adjustment   · Adjusted BMI:       · BMI Category:  Normal weight (BMI 18.5-24. 9)       Nutrition Diagnosis:   · Inadequate oral intake, Altered GI function related to altered GI function, other (specify) (reduced appetite) as evidenced by nausea, poor intake prior to admission, lab values      Nutrition Interventions:   Food and/or Nutrient Delivery: Continue current diet, Start oral nutrition supplement  Nutrition Education and Counseling: Education not appropriate  Coordination of Nutrition Care: Continue to monitor while inpatient    Goals:  Pt to eat > 75% of meals will be able tolerate diet advancement with in 3 days, BM q 1-3 days, BMI 18.5-24.9, K+ 3.5-5.1, albumin > 3.5       Nutrition Monitoring and Evaluation:   Behavioral-Environmental Outcomes:    Food/Nutrient Intake Outcomes: Food and nutrient intake, Supplement intake, Diet advancement/tolerance  Physical Signs/Symptoms Outcomes: Biochemical data, Meal time behavior, Weight, GI status, Nausea/vomiting, Diarrhea     F/U:9/10/2021    Discharge Planning:     Too soon to determine     Electronically signed by Blima Hodgkin on 9/7/2021 at 3:59 PM    Contact: SUZAN [phone]

## 2021-09-07 NOTE — PROGRESS NOTES
conducted an initial consultation and Spiritual Assessment for Van Menchaca, who is a 80 y.o.,female. Patients Primary Language is: Callie Amel. According to the patients EMR Denominational Affiliation is: No preference. The reason the Patient came to the hospital is:   Patient Active Problem List    Diagnosis Date Noted    Colitis 09/06/2021    Urinary retention 09/06/2021    Paroxysmal atrial fibrillation ([facility] 09/06/2021    Abdominal pain 09/06/2021    Lung mass 09/06/2021    Hypokalemia 09/06/2021    Allergic rhinitis     Chronic obstructive lung disease (HCC)     Congestive heart failure (CHF) (HCC)     Essential hypertension     Gastroesophageal reflux disease     Hyperlipidemia     Myocardial infarction ([facility]     Osteopenia     Chronic constipation 10/22/2018    Diverticular disease 09/04/2018    Pulmonary hypertension ([facility] 09/04/2018    Coronary arteriosclerosis 08/30/2018    Cardiac pacemaker in situ 01/01/2007        The  provided the following Interventions:  Initiated a relationship of care and support. Explored issues of zulema, spirituality and/or Yarsanism needs while hospitalized. Listened empathically. Provided chaplaincy education. Provided information about Spiritual Care Services. Offered prayer and assurance of continued prayers on patient's behalf. Chart reviewed. The following outcomes were achieved:  Patient shared some information about their medical narrative and spiritual journey/beliefs. Patient processed feeling about current hospitalization. Patient expressed gratitude for the 's visit. Assessment:  Patient did not indicate any spiritual or Yarsanism issues which require Spiritual Care Services interventions at this time. Patient does not have any Yarsanism/cultural needs that will affect patients preferences in health care.     Plan:  Chaplains will continue to follow and will provide pastoral care on an as needed or requested basis.  recommends bedside caregivers page  on duty if patient shows signs of acute spiritual or emotional distress. Per patient, not feeling her best but better since admission. May have to do rest today. Great report concerning staff.  provided prayer of comfort.      [address] [facility]   Staff [address] [facility]   [phone]        er

## 2021-09-07 NOTE — PROGRESS NOTES
Pt resting in bed with visitor at bedside,no acute distress or sob, bed in lowest position, denies pain but does have tenderness to abdomen, no needs voiced at this time, call bell is in reach.

## 2021-09-07 NOTE — CONSULTS
[address], [place]    Consult Report    NAME:  Raj Duarte  SEX:   female  ADMIT: 2021  DATE OF CONSULT:2021  REFERRING PHYSICIAN:  GIO Ness  : [id number]  MR#    [id number]  ROOM: [address] [place] [address]#  [de-identified]    CONSULTING PHYSICIAN:  Anisha Fish MD    REASON FOR CONSULTATION:  Lung mass    HISTORY OF PRESENT ILLNESS:  Raj Duarte is a 80 y.o. elderly  female with a past medical history for COPD nocturnal oxygen dependent, paroxsymal atrial fib, hypertension, hyperlipidemia, diastolic congestive heart failure, coronary artery disease, AICD, and recently diagnosed with diverticulitis. Patient presents to the ED with a chief complaint of abdominal pain. Patient states that abdominal pain has been ongoing since her last admission at [facility]. She complains of generalized abdominal pain, but mostly to LLQ that has been ongoing for the last 2 weeks, she reports intermittent sharp pain, but a continuous dull ache, nothing has relieved the pain, but palpation aggravates the pain, she is rating the pain 8/10. Patient was admitted on 2021 at [facility] for diagnosis of diverticulitis, there she was started on IV Zosyn and transition to p.o. Augmentin. Other accompanying symptoms that the patient complained of is fever, chills, weakness, lightheaded, headaches, nausea, mucoid stools, and shortness of breath that is chronic. Patient denies chest pain, palpitations, cough, vomiting, diarrhea and constipation. In the ED WBC 20.7, H&H 10.8/33.1, potassium 3.1, albumin 2.9, CT of the abdomen showing severe distal colitis involving the rectum and half of the sigmoid colon, 4.6 cm mass in her right lower lobe with surrounding consolidation. In the ED patient received 2 L of IV fluids, 3.375 of Zosyn, 50 mg of Toradol via IV, and 500 mg of Flagyl via IV.     REVIEW OF SYSTEMS:  General ROS: negative for - chills, fatigue or fever  ENT ROS: negative for - epistaxis, headaches, nasal congestion, sore throat or vertigo  Hematological and Lymphatic ROS: negative for - bleeding problems, jaundice, night sweats or weight loss  Endocrine ROS: negative for - malaise/lethargy, mood swings, palpitations, polydipsia/polyuria or temperature intolerance  Respiratory ROS: no cough, shortness of breath, or wheezing  Cardiovascular ROS: no chest pain or dyspnea on exertion  Gastrointestinal ROS: no abdominal pain, change in bowel habits, or black or bloody stools  Genito-Urinary ROS: no dysuria, trouble voiding, or hematuria  Musculoskeletal ROS: negative  negative for - joint pain, joint stiffness, joint swelling or muscular weakness  Neurological ROS: no TIA or stroke symptoms    PAST MEDICAL HISTORY:  Past Medical History:   Diagnosis Date    Arthritis     Asthma     CAD (coronary artery disease)     Heart disease     High cholesterol     Hyperlipidemia    .     PAST SURGICAL HISTORY:  Past Surgical History:   Procedure Laterality Date    HX APPENDECTOMY      HX COLONOSCOPY  01/26/2015    per gi no further colonoscopy needed due to age of the patient, see note 1/26/15    HX HERNIA REPAIR Left 2001    inguinal    HX HYSTERECTOMY      MA CARDIAC SURG PROCEDURE UNLIST  2007    defibrillator placed       SOCIAL HISTORY:  Social History     Socioeconomic History    Marital status:      Spouse name: Not on file    Number of children: Not on file    Years of education: Not on file    Highest education level: Not on file   Occupational History    Not on file   Tobacco Use    Smoking status: Former Smoker    Smokeless tobacco: Never Used   Vaping Use    Vaping Use: Never used   Substance and Sexual Activity    Alcohol use: Not Currently    Drug use: Never    Sexual activity: Not on file   Other Topics Concern     Service Not Asked    Blood Transfusions Not Asked    Caffeine Concern Not Asked    Occupational Exposure Not Asked    [address] Hazards Not Asked Sleep Concern Not Asked    Stress Concern Not Asked    Weight Concern Not Asked    Special Diet Not Asked    Back Care Not Asked    Exercise Not Asked    Bike Helmet Not Asked    Seat Belt Not Asked    Self-Exams Not Asked   Social History Narrative    Not on file     Social Determinants of Health     Financial Resource Strain:     Difficulty of Paying Living Expenses:    Food Insecurity:     Worried About Running Out of Food in the Last Year:     Ran Out of Food in the Last Year:    Transportation Needs:     Lack of Transportation (Medical):     Lack of Transportation (Non-Medical):    Physical Activity:     Days of Exercise per Week:     Minutes of Exercise per Session:    Stress:     Feeling of Stress :    Social Connections:     Frequency of Communication with Friends and Family:     Frequency of Social Gatherings with Friends and Family:     Attends Temple Services: Active Member of Clubs or Organizations:     Attends Club or Organization Meetings:     Marital Status:    Intimate Partner Violence:     Fear of Current or Ex-Partner:     Emotionally Abused:     Physically Abused:     Sexually Abused:        FAMILY HISTORY:  Family History   Problem Relation Age of Onset    Emphysema Mother        ALLERGIES:  Allergies   Allergen Reactions    Codeine Nausea and Vomiting       MEDICATIONS:  Prior to Admission Medications   Prescriptions Last Dose Informant Patient Reported? Taking? Linzess 72 mcg cap capsule   No Yes   Sig: Take 1 capsule by mouth once daily for 90 days   Omega-3 Fatty Acids 60- mg cpDR   Yes Yes   Sig: Take  by mouth. Oxygen   Yes Yes   Si Liters at night   albuterol (PROVENTIL HFA, VENTOLIN HFA, PROAIR HFA) 90 mcg/actuation inhaler   No Yes   Sig: Take 2 Puffs by inhalation every six (6) hours as needed for Wheezing. Indications: chronic obstructive pulmonary disease   amLODIPine (NORVASC) 5 mg tablet   No No   Sig: Take 1 Tab by mouth daily.    apixaban (ELIQUIS) 2.5 mg tablet   Yes Yes   Sig: Take 2.5 mg by mouth two (2) times a day. arformoteroL (BROVANA) 15 mcg/2 mL nebu neb solution   Yes No   Sig: 15 mcg by Nebulization route. calcium citrate-vitamin d3 (CITRACAL+D) 315 mg-5 mcg (200 unit) tab   Yes Yes   Sig: Take 1 Tab by mouth daily (with breakfast). carvediloL (COREG) 6.25 mg tablet   Yes Yes   Sig: Take 3.125 mg by mouth two (2) times daily (with meals). cetirizine (ZYRTEC) 5 mg tablet   Yes No   Sig: Take 5 mg by mouth daily. cyanocobalamin (VITAMIN B12) 500 mcg tablet   Yes No   Sig: Take 500 mcg by mouth daily. denosumab (Prolia) 60 mg/mL injection   Yes No   Si mg by SubCUTAneous route. famotidine (PEPCID) 20 mg tablet   Yes Yes   Sig: Take 20 mg by mouth two (2) times a day. fluticasone propionate (FLONASE ALLERGY RELIEF NA)   Yes Yes   Si Spray by Nasal route. furosemide (LASIX) 20 mg tablet   Yes Yes   Sig: Take 40 mg by mouth daily. levocetirizine (XYZAL) 5 mg tablet   Yes No   Sig: Take 5 mg by mouth daily. lidocaine (LIDODERM) 5 %   No No   Sig: Apply patch to the affected area for 12 hours a day and remove for 12 hours a day. lisinopriL (PRINIVIL, ZESTRIL) 20 mg tablet   Yes Yes   Sig: Take 20 mg by mouth daily. methocarbamoL (ROBAXIN) 500 mg tablet   No No   Sig: Take 1 Tab by mouth four (4) times daily. multivitamin (ONE A DAY) tablet   Yes Yes   Sig: Take 1 Tab by mouth daily. polyethylene glycol (MIRALAX) 17 gram/dose powder   Yes Yes   Sig: Take 17 g by mouth daily. promethazine (PHENERGAN) 25 mg tablet   No Yes   Sig: Take 1 Tab by mouth every twelve (12) hours as needed for Nausea. simvastatin (ZOCOR) 20 mg tablet   Yes Yes   Sig: Take 20 mg by mouth nightly. tamsulosin (Flomax) 0.4 mg capsule   Yes Yes   Sig: Take 0.4 mg by mouth daily.       Facility-Administered Medications Last Administration Doses Remaining   albuterol (PROVENTIL HFA, VENTOLIN HFA, PROAIR HFA) inhaler 2 Puff None recorded         Current Facility-Administered Medications   Medication Dose Route Frequency    levoFLOXacin (LEVAQUIN) 750 mg in D5W IVPB  750 mg IntraVENous Q48H    sodium chloride (NS) flush 5-40 mL  5-40 mL IntraVENous Q8H    sodium chloride (NS) flush 5-40 mL  5-40 mL IntraVENous PRN    acetaminophen (TYLENOL) tablet 650 mg  650 mg Oral Q6H PRN    Or    acetaminophen (TYLENOL) suppository 650 mg  650 mg Rectal Q6H PRN    polyethylene glycol (MIRALAX) packet 17 g  17 g Oral DAILY PRN    ondansetron (ZOFRAN ODT) tablet 4 mg  4 mg Oral Q8H PRN    Or    ondansetron (ZOFRAN) injection 4 mg  4 mg IntraVENous Q6H PRN    metroNIDAZOLE (FLAGYL) IVPB premix 500 mg  500 mg IntraVENous Q8H    melatonin tablet 3 mg  3 mg Oral QHS PRN    traMADoL (ULTRAM) tablet 50 mg  50 mg Oral Q6H PRN    albuterol (PROVENTIL HFA, VENTOLIN HFA, PROAIR HFA) inhaler 2 Puff  2 Puff Inhalation Q6H PRN    amLODIPine (NORVASC) tablet 5 mg  5 mg Oral DAILY    apixaban (ELIQUIS) tablet 2.5 mg  2.5 mg Oral BID    calcium-vitamin D (OS-MOOK +D3) 250 mg-125 unit per tablet 1 Tablet  1 Tablet Oral DAILY    carvediloL (COREG) tablet 3.125 mg  3.125 mg Oral BID WITH MEALS    famotidine (PEPCID) tablet 20 mg  20 mg Oral BID    fluticasone propionate (FLONASE) 50 mcg/actuation nasal spray 1 Spray  1 Spray Both Nostrils DAILY    furosemide (LASIX) tablet 40 mg  40 mg Oral DAILY    lisinopriL (PRINIVIL, ZESTRIL) tablet 20 mg  20 mg Oral DAILY    therapeutic multivitamin (THERAGRAN) tablet 1 Tablet  1 Tablet Oral DAILY    tamsulosin (FLOMAX) capsule 0.4 mg  0.4 mg Oral DAILY    atorvastatin (LIPITOR) tablet 20 mg  20 mg Oral QHS       PHYSICAL EXAMINATION:  Patient Vitals for the past 24 hrs:   Temp Pulse Resp BP SpO2   09/07/21 0104 97 °F (36.1 °C) 61 12 (!) 81/42 96 %   09/07/21 0000 -- 61 -- -- --   09/06/21 2000 -- 77 -- -- --   09/06/21 1924 96.9 °F (36.1 °C) 77 16 (!) 91/49 92 %   09/06/21 1545 98.4 °F (36.9 °C) 100 18 137/61 93 %   09/06/21 1527 -- 98 -- (!) 157/78 94 % 09/06/21 1203 98.2 °F (36.8 °C) 83 20 (!) 140/81 93 %     HEENT: NCAT, PERRLA  Neck: supple  Chest: clear  CVS:RRR  Abd: soft, NT, ND, BS++  Ext: no edema  Neuro: non focal exam        LABORATORY DATA:  Labs: Results:   ABG   No results found for: PH, PHI, PCO2, PCO2I, PO2, PO2I, HCO3, HCO3I, FIO2, FIO2I   Chemistry Recent Labs     09/07/21  0500 09/06/21  1200   GLU 83 125*    140   K 3.2 3.1*    105   CO2 22 25   BUN 12 8*   CREA 0.80 0.50*   CA 7.8* 9.0   AGAP 8 10   BUCR 15 16   AP  --  59   TP  --  6.4   ALB  --  2.9*   GLOB  --  3.5   AGRAT  --  0.8    Estimated Creatinine Clearance: 41 mL/min (based on SCr of 0.8 mg/dL). CBC w/Diff Recent Labs     09/07/21  0500 09/06/21  1200   WBC 18.8* 20.7*   RBC 3.33* 3.42*   HGB 10.4* 10.8*   HCT 32.4* 33.1*    336   GRANS  --  89*   LYMPH  --  11*   EOS  --  0      BTNP No results found for: BNP, BNPP, BNPPPOC, XBNPT, BNPNT   Cardiac Enzymes No results for input(s): CPK, CKND1, KINJAL in the last 72 hours. No lab exists for component: CKRMB, TROIP   Coagulation No results for input(s): PTP, INR, APTT, INREXT in the last 72 hours. Liver Enzymes Recent Labs     09/06/21  1200   TP 6.4   ALB 2.9*   AP 59   ALT 19      Thyroid Studies No results for input(s): T4, T3U, TSH, TSHEXT in the last 72 hours. No lab exists for component: T3RU     UA No results for input(s): UGLU in the last 72 hours. No lab exists for component: SOURCEUR, UBILIRUBIN, UKET, USPG, UOCB, UPH, UPSC, UUROBILISQ, UNITR, URINELEUKOC   Blood cultures No results found for: CULT, CULT1, CULT2, GMS    No results for input(s): CULT, CULT1, CULT2, GMS in the last 72 hours.     No results found for: CULT, CULT1, CULT2, GMS   Cultures All Micro Results       None             All Micro Results       None           CT ABD PELV W CONT    Result Date: 9/6/2021  EXAM: CT ABD PELV W CONT CLINICAL INDICATION/HISTORY:  abd pain.   > Additional: None COMPARISON: 8/28/2020   > Correlative imaging: None. TECHNIQUE: Axial CT imaging of the abdomen and pelvis was performed with intravenous contrast. Multiplanar reformats were generated. One or more dose reduction techniques were used on this CT: automated exposure control, adjustment of the mAs and/or kVp according to patient size, and iterative reconstruction techniques. The specific techniques used on this CT exam have been documented in the patient's electronic medical record. Digital imaging and communications in medicine (DICOM) format image data are available to nonaffiliated external healthcare facilities or entities on a secure, media free, reciprocally searchable basis with patient authorization for at least a 12 month period after this study. _______________ FINDINGS: LOWER CHEST: Interval development of a mass in the right lower lobe with surrounding consolidation, proximally 4.6 x 4.3 cm. There is considerable bilateral lung base scarring and emphysema. Cardiac pacemaker in situ. LIVER, BILIARY:   > Liver: Mildly diminished in attenuation. Focally diminished density in segment 4, characteristic of steatosis. No definite liver masses. Incidentally noted small perfusional variant in the anterior left lobe.   > Biliary: Unremarkable. SPLEEN: Unremarkable. PANCREAS: Unremarkable. ADRENALS: Unremarkable. KIDNEYS/URETERS/BLADDER: Small simple cysts bilaterally requiring no additional follow-up. Urinary bladder largely secured by streak artifact from the left hip. PELVIC ORGANS: Probable hysterectomy, obscured. GASTROINTESTINAL TRACT: Extensive diverticulosis of the sigmoid colon. There is a long segment of marked bowel wall thickening beginning in the mid sigmoid in reaching all the way to the rectum, with marked surrounding inflammation, consistent with colitis. Remaining large bowel unremarkable. Small bowel loops are unremarkable. VASCULATURE: Moderate aortoiliac atherosclerosis without aneurysm. LYMPH NODES: No enlarged lymph nodes.  OTHER: Small right inguinal hernia containing a knuckle of small bowel. No obstruction. MUSCULOSKELETAL: Left hip arthroplasty with severe streak artifact. Marked dextrorotoscoliosis of the lumbar spine. _______________     1. Severe distal colitis involving rectum and roughly half of the sigmoid colon. There is also extensive sigmoid diverticulosis. 2. 4.6 cm mass in the right lower lobe with surrounding consolidation. This should be considered to represent lung carcinoma until proven otherwise. 3. Mild hepatic steatosis. ASSESSMENT:  - Right sided Lung mass new since 8/2020  -Colitis with abdominal pain  -Hypokalemia  -Hyperlipidemia  -Paroxysmal atrial fibrillation    PLAN:  I have reviewed the entire presentation , labs and radiology. Patient comes in for abdominal pain. CT abdomen/pelvis showed changes consistent with colitis but also demonstrated a new RLL density which likely represents a mass / surrounding infiltrate.  This was not seen on a CT abd on 8/2020.   - Would like a dedicated CT scan chest with contrast to better evaluate the lung and mediastinum  - Further recommendation based on the CT findings    Thanks for the consult    Mackenzie Morton MD  September 7, 2021  7:54 AM

## 2021-09-07 NOTE — PROGRESS NOTES
Reason for Admission: Chart reviewed and noted patient presented with C/O abdominal pain. Pt C/O generalized abdominal pain, but mostly to LLQ that has been ongoing for the last 2 weeks. She states the pain is an intermittent sharp pain, but a continuous dull ache. Nothing has relieved the pain, but palpation aggravates the pain. Also, she is having fever, chills, weakness, lightheadedness, headaches, nausea, mucoid stools and SOB that is chronic. Dx: Abdominal Pain    PMH: COPD, Paroxsymal Atrial Fib, HTN, HLD, Diastolic CHF, CAD, AICD, Diverticulitis                      RUR Score: 16%                    Plan for utilizing home health: TBD         PCP: First and Last name:  James Vega MD     Name of Practice:    Are you a current patient: Yes/No:    Approximate date of last visit:    Can you participate in a virtual visit with your PCP:                     Current Advanced Directive/Advance Care Plan: DNR- No ACP      Healthcare Decision Maker: Kierra Robbins  Click here to complete [address] including selection of the Healthcare Decision Maker Relationship (ie \"Primary\")                             Transition of Care Plan: TBD    Care Management Interventions  PCP Verified by CM: Yes  Transition of Care Consult (CM Consult):  Discharge Planning  Current Support Network:Daughter- Kierra Robbins- [phone]. Patient has a friend that lives with her. She has a cane and a rollator that she uses. She plans to return back home at discharge.

## 2021-09-08 LAB
ANION GAP SERPL CALC-SCNC: 9 MMOL/L
BUN SERPL-MCNC: 8 MG/DL (ref 9–21)
BUN/CREAT SERPL: 20
CA-I BLD-MCNC: 8 MG/DL (ref 8.5–10.5)
CHLORIDE SERPL-SCNC: 109 MMOL/L (ref 94–111)
CO2 SERPL-SCNC: 21 MMOL/L (ref 21–33)
CREAT SERPL-MCNC: 0.4 MG/DL (ref 0.7–1.2)
ERYTHROCYTE [DISTWIDTH] IN BLOOD BY AUTOMATED COUNT: 14 % (ref 11.6–14.5)
GLUCOSE SERPL-MCNC: 84 MG/DL (ref 70–110)
HCT VFR BLD AUTO: 28.9 % (ref 35–45)
HGB BLD-MCNC: 9.2 G/DL (ref 12–16)
MAGNESIUM SERPL-MCNC: 1.8 MG/DL (ref 1.7–2.8)
MCH RBC QN AUTO: 30.8 PG (ref 24–34)
MCHC RBC AUTO-ENTMCNC: 31.8 G/DL (ref 31–37)
MCV RBC AUTO: 96.7 FL (ref 78–100)
NRBC # BLD: 0 K/UL (ref 0–0.01)
NRBC BLD-RTO: 0 PER 100 WBC
PLATELET # BLD AUTO: 303 K/UL (ref 135–420)
PMV BLD AUTO: 9.5 FL (ref 9.2–11.8)
POTASSIUM SERPL-SCNC: 3 MMOL/L (ref 3.2–5.1)
RBC # BLD AUTO: 2.99 M/UL (ref 4.2–5.3)
SODIUM SERPL-SCNC: 139 MMOL/L (ref 135–145)
WBC # BLD AUTO: 16.3 K/UL (ref 4.6–13.2)

## 2021-09-08 PROCEDURE — 94761 N-INVAS EAR/PLS OXIMETRY MLT: CPT

## 2021-09-08 PROCEDURE — 74011250636 HC RX REV CODE- 250/636: Performed by: NURSE PRACTITIONER

## 2021-09-08 PROCEDURE — 99231 SBSQ HOSP IP/OBS SF/LOW 25: CPT | Performed by: INTERNAL MEDICINE

## 2021-09-08 PROCEDURE — 74011250637 HC RX REV CODE- 250/637: Performed by: INTERNAL MEDICINE

## 2021-09-08 PROCEDURE — 77010033678 HC OXYGEN DAILY

## 2021-09-08 PROCEDURE — 85027 COMPLETE CBC AUTOMATED: CPT

## 2021-09-08 PROCEDURE — 83735 ASSAY OF MAGNESIUM: CPT

## 2021-09-08 PROCEDURE — 74011250637 HC RX REV CODE- 250/637: Performed by: NURSE PRACTITIONER

## 2021-09-08 PROCEDURE — 65270000029 HC RM PRIVATE

## 2021-09-08 PROCEDURE — 36415 COLL VENOUS BLD VENIPUNCTURE: CPT

## 2021-09-08 PROCEDURE — 80048 BASIC METABOLIC PNL TOTAL CA: CPT

## 2021-09-08 RX ORDER — POTASSIUM CHLORIDE 750 MG/1
40 TABLET, EXTENDED RELEASE ORAL 2 TIMES DAILY
Status: DISCONTINUED | OUTPATIENT
Start: 2021-09-08 | End: 2021-09-12

## 2021-09-08 RX ORDER — FAMOTIDINE 20 MG/1
10 TABLET, FILM COATED ORAL 2 TIMES DAILY
Status: DISCONTINUED | OUTPATIENT
Start: 2021-09-08 | End: 2021-09-14 | Stop reason: HOSPADM

## 2021-09-08 RX ADMIN — POTASSIUM CHLORIDE 40 MEQ: 750 TABLET, EXTENDED RELEASE ORAL at 17:36

## 2021-09-08 RX ADMIN — APIXABAN 2.5 MG: 2.5 TABLET, FILM COATED ORAL at 09:09

## 2021-09-08 RX ADMIN — CARVEDILOL 3.12 MG: 3.12 TABLET, FILM COATED ORAL at 17:37

## 2021-09-08 RX ADMIN — FLUTICASONE PROPIONATE 1 SPRAY: 50 SPRAY, METERED NASAL at 09:14

## 2021-09-08 RX ADMIN — ACETAMINOPHEN 650 MG: 325 TABLET ORAL at 13:29

## 2021-09-08 RX ADMIN — METRONIDAZOLE 500 MG: 500 INJECTION, SOLUTION INTRAVENOUS at 21:48

## 2021-09-08 RX ADMIN — POTASSIUM CHLORIDE 40 MEQ: 750 TABLET, EXTENDED RELEASE ORAL at 09:13

## 2021-09-08 RX ADMIN — Medication 10 ML: at 21:48

## 2021-09-08 RX ADMIN — THERA TABS 1 TABLET: TAB at 09:07

## 2021-09-08 RX ADMIN — CARVEDILOL 3.12 MG: 3.12 TABLET, FILM COATED ORAL at 09:06

## 2021-09-08 RX ADMIN — ONDANSETRON 4 MG: 2 INJECTION INTRAMUSCULAR; INTRAVENOUS at 17:10

## 2021-09-08 RX ADMIN — TAMSULOSIN HYDROCHLORIDE 0.4 MG: 0.4 CAPSULE ORAL at 09:08

## 2021-09-08 RX ADMIN — Medication 1 TABLET: at 09:06

## 2021-09-08 RX ADMIN — ATORVASTATIN CALCIUM 20 MG: 10 TABLET, FILM COATED ORAL at 21:48

## 2021-09-08 RX ADMIN — AMLODIPINE BESYLATE 5 MG: 5 TABLET ORAL at 09:08

## 2021-09-08 RX ADMIN — METRONIDAZOLE 500 MG: 500 INJECTION, SOLUTION INTRAVENOUS at 13:30

## 2021-09-08 RX ADMIN — FUROSEMIDE 40 MG: 40 TABLET ORAL at 09:08

## 2021-09-08 RX ADMIN — Medication 10 ML: at 13:32

## 2021-09-08 RX ADMIN — FAMOTIDINE 20 MG: 20 TABLET, FILM COATED ORAL at 09:07

## 2021-09-08 RX ADMIN — FAMOTIDINE 10 MG: 20 TABLET, FILM COATED ORAL at 17:38

## 2021-09-08 RX ADMIN — Medication 10 ML: at 06:04

## 2021-09-08 RX ADMIN — LISINOPRIL 20 MG: 20 TABLET ORAL at 09:09

## 2021-09-08 RX ADMIN — METRONIDAZOLE 500 MG: 500 INJECTION, SOLUTION INTRAVENOUS at 06:05

## 2021-09-08 RX ADMIN — APIXABAN 2.5 MG: 2.5 TABLET, FILM COATED ORAL at 17:37

## 2021-09-08 RX ADMIN — LEVOFLOXACIN 750 MG: 5 INJECTION, SOLUTION INTRAVENOUS at 15:24

## 2021-09-08 NOTE — PROGRESS NOTES
Pt bathed. Complained of abdominal pain. 5/10. At 10:41 I Ambulated pt to the restroom. She had a bowel movement and urinated. Bowels were semi-solid, green in color. Urine was yellow and clear. Pt had SOB afterwards.

## 2021-09-08 NOTE — PROGRESS NOTES
2115- pt received HS meds. 2355- Pt received PRN tramadol r/t pain and discomfort. 0230- Pt assisted to bathroom, back to bed tolerated, denies needs, call bell in reach.

## 2021-09-08 NOTE — PROGRESS NOTES
Hospitalist Progress Note     INTERNAL MEDICINE PROGRESS NOTE  Patient: Ovidio Dietz   YOB: 1934   MRN: [id number]      Hospital course / Assessment    Principle Problems:  Abdominal Pain, Distal Colitis   - presented with abdominal pain, LLQ, Dull ache. - on admission afebrile, vitals stable, WBC 20K, Hgb 10.8  - CT of the abdomen showing severe distal colitis involving the rectum and half of the sigmoid colon  - admitted too medical floor,NPO, IVF support, IV flagyl, pain control and GI consulted  Lung mass - NEW , incidental finding  -CT: 4.6 cm mass in the right lower lobe with surrounding consolidation. This should be considered to represent lung carcinoma until proven otherwise.  -will consult pulmonologist, with possible outpatient follow-up with pulmonary and oncology once tissue diagnosis done  -patient is stable  Hypokalemia   - replace and monitor   Anemia Chronic   - Hgb 10.8 ->9.2, continue with pepcid, monitor h/h and sings of LGIB,   - hold Eliquis to continue with drop or bleed     Active Problems:  CAD  PAF - on BB and Eliquis   HTN - on Norvasc, Lisnopril, and coreg  Diastolic CHF chronic- on lasix, Coreg   S/P AICD   Asthma / COPD - Stable , on PRN albuterol   Hyperlipidemia - on statin   GERD - on Pepcid      Code Status: DNR  DVT prophylaxis: pharmacologic and mechanical    Today Recommendation and Plan:   continue with antibiotic , fluid support  Advance diet to clear liquid   GI recommendations / plans   Monitor H/h   replace K       Disposition    Disposition: home     Subjective / ROS:   Patient states she feel better, no melena .        Medical Decision Making   Chart, Images and Lab data reviewed, necessary medical Orders placed   Discussed with nursing staff     Vitals:    09/08/21 0045 09/08/21 0400 09/08/21 0432 09/08/21 0751   BP: (!) 118/51  (!) 106/52 (!) 118/57   Pulse: 72 75 75 76   Resp: 18  16 17   Temp: 97 °F (36.1 °C)  97.1 °F (36.2 °C) 97.8 °F (36.6 °C)   SpO2: 94%  96% 96%   Weight:  59.4 kg (130 lb 14.4 oz)     Height:         Temp (24hrs), Av.2 °F (36.2 °C), Min:96.3 °F (35.7 °C), Max:97.8 °F (36.6 °C)    No intake or output data in the 24 hours ending 21 0757    Physical Exam:   General Appearance:   Appears in no acute distress.,  HEENT:   Moist oral mucous membranes, conjunctiva clear,   Neck:   Supple  Lungs: No wheezes. , No rales. , Normal respiratory effort,   Heart:   Regular rate and rhythm  Abdomen:   Soft , Non-distended and Non-tender,   Extremities:   - edema of legs  Neuro:   alert, oriented, moves all extremities well    Current medications:     Current Facility-Administered Medications   Medication Dose Route Frequency    levoFLOXacin (LEVAQUIN) 750 mg in D5W IVPB  750 mg IntraVENous Q48H    sodium chloride (NS) flush 5-40 mL  5-40 mL IntraVENous Q8H    sodium chloride (NS) flush 5-40 mL  5-40 mL IntraVENous PRN    acetaminophen (TYLENOL) tablet 650 mg  650 mg Oral Q6H PRN    Or    acetaminophen (TYLENOL) suppository 650 mg  650 mg Rectal Q6H PRN    polyethylene glycol (MIRALAX) packet 17 g  17 g Oral DAILY PRN    ondansetron (ZOFRAN ODT) tablet 4 mg  4 mg Oral Q8H PRN    Or    ondansetron (ZOFRAN) injection 4 mg  4 mg IntraVENous Q6H PRN    metroNIDAZOLE (FLAGYL) IVPB premix 500 mg  500 mg IntraVENous Q8H    melatonin tablet 3 mg  3 mg Oral QHS PRN    traMADoL (ULTRAM) tablet 50 mg  50 mg Oral Q6H PRN    albuterol (PROVENTIL HFA, VENTOLIN HFA, PROAIR HFA) inhaler 2 Puff  2 Puff Inhalation Q6H PRN    amLODIPine (NORVASC) tablet 5 mg  5 mg Oral DAILY    apixaban (ELIQUIS) tablet 2.5 mg  2.5 mg Oral BID    calcium-vitamin D (OS-MOOK +D3) 250 mg-125 unit per tablet 1 Tablet  1 Tablet Oral DAILY    carvediloL (COREG) tablet 3.125 mg  3.125 mg Oral BID WITH MEALS    famotidine (PEPCID) tablet 20 mg  20 mg Oral BID    fluticasone propionate (FLONASE) 50 mcg/actuation nasal spray 1 Spray  1 Spray Both Nostrils DAILY    furosemide (LASIX) tablet 40 mg  40 mg Oral DAILY    lisinopriL (PRINIVIL, ZESTRIL) tablet 20 mg  20 mg Oral DAILY    therapeutic multivitamin (THERAGRAN) tablet 1 Tablet  1 Tablet Oral DAILY    tamsulosin (FLOMAX) capsule 0.4 mg  0.4 mg Oral DAILY    atorvastatin (LIPITOR) tablet 20 mg  20 mg Oral QHS          Laboratory and Radiology Data :      No results found for: FERR  WBC   Date Value Ref Range Status   09/08/2021 16.3 (H) 4.6 - 13.2 K/uL Final     No results found for: REUBEN  No results found for: UEO    Microbiology    No results found for: GMS    Recent Results (from the past 24 hour(s))   METABOLIC PANEL, BASIC    Collection Time: 09/08/21  4:53 AM   Result Value Ref Range    Sodium 139 135 - 145 mmol/L    Potassium 3.0 (L) 3.2 - 5.1 mmol/L    Chloride 109 94 - 111 mmol/L    CO2 21 21 - 33 mmol/L    Anion gap 9 mmol/L    Glucose 84 70 - 110 mg/dL    BUN 8 (L) 9 - 21 mg/dL    Creatinine 0.40 (L) 0.70 - 1.20 mg/dL    BUN/Creatinine ratio 20      GFR est AA >60 ml/min/1.73m2    GFR est non-AA >60 ml/min/1.73m2    Calcium 8.0 (L) 8.5 - 10.5 mg/dL   MAGNESIUM    Collection Time: 09/08/21  4:53 AM   Result Value Ref Range    Magnesium 1.8 1.7 - 2.8 mg/dL   CBC W/O DIFF    Collection Time: 09/08/21  4:53 AM   Result Value Ref Range    WBC 16.3 (H) 4.6 - 13.2 K/uL    RBC 2.99 (L) 4.20 - 5.30 M/uL    HGB 9.2 (L) 12.0 - 16.0 g/dL    HCT 28.9 (L) 35.0 - 45.0 %    MCV 96.7 78.0 - 100.0 FL    MCH 30.8 24.0 - 34.0 PG    MCHC 31.8 31.0 - 37.0 g/dL    RDW 14.0 11.6 - 14.5 %    PLATELET 808 284 - 487 K/uL    MPV 9.5 9.2 - 11.8 FL    NRBC 0.0 0.0  WBC    ABSOLUTE NRBC 0.00 0.00 - 0.01 K/uL       XR Results:  Results from Hospital Encounter encounter on 06/21/21    XR CHEST PA LAT    Narrative  EXAM: Chest Radiography    CLINICAL INDICATION/HISTORY: Shortness of breath;  > Additional: None    COMPARISON: 3/9/2020    TECHNIQUE: PA and lateral    _______________    FINDINGS:    HEART AND MEDIASTINUM: Normal heart size with normal mediastinal contours. Biventricular transvenous cardiac pacemaker/AICD in situ, with left-sided  generator. LUNGS AND PLEURAL SPACES: Clear. BONY THORAX AND SOFT TISSUES: There are several thoracic compression fractures  present. The previous chest radiograph was performed as a portable frontal view. There is an old CT of the thorax dated 1/27/2020, showing all of these  compression fractures to be unchanged and therefore chronic.    _______________    Impression  No active cardiopulmonary disease. Chronic thoracic spine compression fractures  as noted. CT Results:  Results from Hospital Encounter encounter on 09/06/21    CT CHEST W CONT    Narrative  EXAM: CT chest    CLINICAL INDICATION/HISTORY: RLL lung mass  > Additional: None. COMPARISON: None. > Reference Exam: CT abdomen one day earlier. TECHNIQUE: Axial CT imaging from the thoracic inlet through the diaphragm with  intravenous contrast. Multiplanar reformats were generated. One or more dose  reduction techniques were used on this CT: automated exposure control,  adjustment of the mAs and/or kVp according to patient size, and iterative  reconstruction techniques. The specific techniques used on this CT exam have  been documented in the patient's electronic medical record. Digital Imaging and  Communications in Medicine (DICOM) format image data are available to  nonaffiliated external healthcare facilities or entities on a secure, media  free, reciprocally searchable basis with patient authorization for at least a  12-month period after this study. _______________    FINDINGS:    LUNGS:  > Diffuse emphysema.  > Posterior right lower lobe lung mass measures 4.1 x 4.3 cm. A large portion  of this mass is essentially low in attenuation consistent with necrosis. Adjacent pleural-based consolidation potentially postobstructive atelectasis or  infection. PLEURA: No significant pleural effusion.  No pneumothorax. AIRWAY: Patent centrally. MEDIASTINUM: Normal heart size. No pericardial effusion. Great vessels  unremarkable. LYMPH NODES: Enlarged right interlobar lymph node measuring 12 mm in short axis. Enlarged right hilar lymph node measuring 12 mm in short axis. Borderline  enlarged right lower paratracheal lymph node measuring 10 mm in short axis. UPPER ABDOMEN: Hepatic steatosis. OTHER: No acute or aggressive osseous abnormalities identified. Chronic  appearing T8 or T9 compression fracture. _______________    Impression  1. Necrotic right lower lobe lung mass highly suspicious for primary lung  malignancy. Adjacent pleural-based consolidation potentially postobstructive  atelectasis or infection. 2. Metastatic lymphadenopathy to right interlobar, right hilar and potentially  right lower paratracheal lymph nodes. MRI Results:  No results found for this or any previous visit. Nuclear Medicine Results:  No results found for this or any previous visit. US Results:  No results found for this or any previous visit. IR Results:  No results found for this or any previous visit. VAS/US Results:  No results found for this or any previous visit. Carley Paulino M.D.   Hospitalist

## 2021-09-08 NOTE — PROGRESS NOTES
[facility]       Progress Note     Name: Madonna Fierro   : 1934   MRN: [id number]   Date: 2021    [x]I have reviewed the flowsheet and previous days notes. Events, vitals, medications and notes from last 24 hours reviewed. ASSESSMENT:   - Right sided Lung mass new since 2020  -Colitis with abdominal pain  -Hypokalemia  -Hyperlipidemia  -Paroxysmal atrial fibrillation   PLAN:   - CT scan chest personally reviewed. Highly suspicious for primary lung malignancy with mediastinal and hilar LN involvement( ipsilateral)  - Needs Tissue diagnosis when stable  - When I discussed the findings of the CT scan , she expressed\" I have had a good life and I am 80years old\"  She would like to discuss this with her family. One of her sons is coming to visit her today  - Continue management for her Colitis                 SUBJECTIVE: I am tired of laying in this bed    ROS:   Otherwise negative except for as mentioned in subjective. Allergy:  Allergies   Allergen Reactions    Codeine Nausea and Vomiting        Vital Signs:    Visit Vitals  BP (!) 118/57 (BP 1 Location: Left upper arm)   Pulse 76   Temp 97.8 °F (36.6 °C)   Resp 17   Ht 5' 3\" (1.6 m)   Wt 59.4 kg (130 lb 14.4 oz)   SpO2 96%   BMI 23.19 kg/m²       O2 Device: Nasal cannula   O2 Flow Rate (L/min): 2 l/min   Temp (24hrs), Av.2 °F (36.2 °C), Min:96.3 °F (35.7 °C), Max:97.8 °F (36.6 °C)       Patient Vitals for the past 8 hrs:   Temp Pulse Resp BP SpO2   21 0751 97.8 °F (36.6 °C) 76 17 (!) 118/57 96 %   21 0432 97.1 °F (36.2 °C) 75 16 (!) 106/52 96 %   21 0400  75            Intake/Output:   Last shift:      No intake/output data recorded. Last 3 shifts: No intake/output data recorded.   No intake or output data in the 24 hours ending 21 0911       Physical Exam:  HEENT: NCAT, PERRLA  Neck: supple  Chest: clear  CVS:RRR  Abd: soft, NT, ND, BS++  Ext: no edema  Neuro: non focal exam      DATA:     Current Facility-Administered Medications   Medication Dose Route Frequency    potassium chloride (KLOR-CON) tablet 40 mEq  40 mEq Oral BID    levoFLOXacin (LEVAQUIN) 750 mg in D5W IVPB  750 mg IntraVENous Q48H    sodium chloride (NS) flush 5-40 mL  5-40 mL IntraVENous Q8H    sodium chloride (NS) flush 5-40 mL  5-40 mL IntraVENous PRN    acetaminophen (TYLENOL) tablet 650 mg  650 mg Oral Q6H PRN    Or    acetaminophen (TYLENOL) suppository 650 mg  650 mg Rectal Q6H PRN    polyethylene glycol (MIRALAX) packet 17 g  17 g Oral DAILY PRN    ondansetron (ZOFRAN ODT) tablet 4 mg  4 mg Oral Q8H PRN    Or    ondansetron (ZOFRAN) injection 4 mg  4 mg IntraVENous Q6H PRN    metroNIDAZOLE (FLAGYL) IVPB premix 500 mg  500 mg IntraVENous Q8H    melatonin tablet 3 mg  3 mg Oral QHS PRN    traMADoL (ULTRAM) tablet 50 mg  50 mg Oral Q6H PRN    albuterol (PROVENTIL HFA, VENTOLIN HFA, PROAIR HFA) inhaler 2 Puff  2 Puff Inhalation Q6H PRN    amLODIPine (NORVASC) tablet 5 mg  5 mg Oral DAILY    apixaban (ELIQUIS) tablet 2.5 mg  2.5 mg Oral BID    calcium-vitamin D (OS-MOOK +D3) 250 mg-125 unit per tablet 1 Tablet  1 Tablet Oral DAILY    carvediloL (COREG) tablet 3.125 mg  3.125 mg Oral BID WITH MEALS    famotidine (PEPCID) tablet 20 mg  20 mg Oral BID    fluticasone propionate (FLONASE) 50 mcg/actuation nasal spray 1 Spray  1 Spray Both Nostrils DAILY    furosemide (LASIX) tablet 40 mg  40 mg Oral DAILY    lisinopriL (PRINIVIL, ZESTRIL) tablet 20 mg  20 mg Oral DAILY    therapeutic multivitamin (THERAGRAN) tablet 1 Tablet  1 Tablet Oral DAILY    tamsulosin (FLOMAX) capsule 0.4 mg  0.4 mg Oral DAILY    atorvastatin (LIPITOR) tablet 20 mg  20 mg Oral QHS                 Labs: Results:   Chemistry Recent Labs     09/08/21  0453 09/07/21  0500 09/06/21  1200   GLU 84 83 125*    139 140   K 3.0* 3.2 3.1*    109 105   CO2 21 22 25   BUN 8* 12 8*   CREA 0.40* 0.80 0.50*   CA 8.0* 7.8* 9.0 AGAP 9 8 10   BUCR 20 15 16   AP  --   --  59   TP  --   --  6.4   ALB  --   --  2.9*   GLOB  --   --  3.5   AGRAT  --   --  0.8      CBC w/Diff Recent Labs     09/08/21  0453 09/07/21  0500 09/06/21  1200   WBC 16.3* 18.8* 20.7*   RBC 2.99* 3.33* 3.42*   HGB 9.2* 10.4* 10.8*   HCT 28.9* 32.4* 33.1*    313 336   GRANS  --   --  89*   LYMPH  --   --  11*   EOS  --   --  0        All Micro Results     None            Imaging:   CT CHEST W CONT    Result Date: 9/7/2021  EXAM: CT chest CLINICAL INDICATION/HISTORY: RLL lung mass   > Additional: None. COMPARISON: None. > Reference Exam: CT abdomen one day earlier. TECHNIQUE: Axial CT imaging from the thoracic inlet through the diaphragm with intravenous contrast. Multiplanar reformats were generated. One or more dose reduction techniques were used on this CT: automated exposure control, adjustment of the mAs and/or kVp according to patient size, and iterative reconstruction techniques. The specific techniques used on this CT exam have been documented in the patient's electronic medical record. Digital Imaging and Communications in Medicine (DICOM) format image data are available to nonaffiliated external healthcare facilities or entities on a secure, media free, reciprocally searchable basis with patient authorization for at least a 12-month period after this study. _______________ FINDINGS: LUNGS:   > Diffuse emphysema.   > Posterior right lower lobe lung mass measures 4.1 x 4.3 cm. A large portion of this mass is essentially low in attenuation consistent with necrosis. Adjacent pleural-based consolidation potentially postobstructive atelectasis or infection. PLEURA: No significant pleural effusion. No pneumothorax. AIRWAY: Patent centrally. MEDIASTINUM: Normal heart size. No pericardial effusion. Great vessels unremarkable. LYMPH NODES: Enlarged right interlobar lymph node measuring 12 mm in short axis.  Enlarged right hilar lymph node measuring 12 mm in short axis. Borderline enlarged right lower paratracheal lymph node measuring 10 mm in short axis. UPPER ABDOMEN: Hepatic steatosis. OTHER: No acute or aggressive osseous abnormalities identified. Chronic appearing T8 or T9 compression fracture. _______________     1. Necrotic right lower lobe lung mass highly suspicious for primary lung malignancy. Adjacent pleural-based consolidation potentially postobstructive atelectasis or infection. 2. Metastatic lymphadenopathy to right interlobar, right hilar and potentially right lower paratracheal lymph nodes.            Valorie Miller MD  September 8, 2021  9:11 AM

## 2021-09-08 NOTE — PROGRESS NOTES
Gastroenterology Progress Note         Admit Date:  2021    Today's Date:  2021    CC: Abdominal pain    Subjective:     History of Present Illness:  Patient is a 80 y.o. female who is seen in consultation for evaluation for abdominal pain and abnormal CT scan. On 2021, the patient underwent CT scan of the abdomen pelvis upon admission to [address] [place] [address].  This revealed the followin. Acute sigmoid diverticulitis with intramural abscesses. No intraperitoneal abscess or free intraperitoneal perforation. She was admitted and treated with IV antibiotics because of abdominal pain and the above finding. On 2021, she had repeat CT scan which revealed the following:  Sigmoid diverticulitis. Previously suspected intramural abscess is no longer present. In addition, paracolic fat stranding has improved slightly. Patient was discharged home on oral antibiotics. She came the emergency room yesterday because of current abdominal pain. She has had no rectal bleeding although this has been an occasional problem in the past due to her known irritable bowel syndrome with alternating constipation diarrhea, predominantly constipation. She had CT scan of the abdomen and pelvis 21 in the emergency room here which revealed the following: Diffuse severe diverticulosis especially in the sigmoid colon, with severe colitis involving roughly half the distal sigmoid colon and the rectum. She was treated initially with IV piperacillin, and is now on IV Levaquin and metronidazole. CT scan also showed a 4.6 cm mass in the right lower lobe with surrounding consolidation suspicious for lung cancer.     The patient is a regular patient of [facility]. She was actually seen by Dr. Otilio Kovacs 2021 for routine follow-up. At that time she treated her for her known irritable bowel syndrome with constipation alternating with diarrhea. This includes use of Linzess 72 mcg a day and as needed MiraLAX.   Also given dicyclomine. Review of the records shows that the patient underwent colonoscopy in 2015 which was incomplete because of severe diverticulosis in the sigmoid colon as well as internal and internal hemorrhoids.     Past medical history is significant for coronary artery disease, history of congestive heart failure, history of COPD and asthma, and use of Eliquis for anticoagulation. 09/08/2021 visit: Patient reports today she was starting to feel better with less abdominal pain. She tried to eat a clear liquid diet for lunch and said she was unable to do so. She is still presently having pain and says he is not feeling as well she did earlier this morning.     Medications:   Current Facility-Administered Medications   Medication Dose Route Frequency    potassium chloride (KLOR-CON) tablet 40 mEq  40 mEq Oral BID    famotidine (PEPCID) tablet 10 mg  10 mg Oral BID    levoFLOXacin (LEVAQUIN) 750 mg in D5W IVPB  750 mg IntraVENous Q48H    sodium chloride (NS) flush 5-40 mL  5-40 mL IntraVENous Q8H    sodium chloride (NS) flush 5-40 mL  5-40 mL IntraVENous PRN    acetaminophen (TYLENOL) tablet 650 mg  650 mg Oral Q6H PRN    Or    acetaminophen (TYLENOL) suppository 650 mg  650 mg Rectal Q6H PRN    polyethylene glycol (MIRALAX) packet 17 g  17 g Oral DAILY PRN    ondansetron (ZOFRAN ODT) tablet 4 mg  4 mg Oral Q8H PRN    Or    ondansetron (ZOFRAN) injection 4 mg  4 mg IntraVENous Q6H PRN    metroNIDAZOLE (FLAGYL) IVPB premix 500 mg  500 mg IntraVENous Q8H    melatonin tablet 3 mg  3 mg Oral QHS PRN    traMADoL (ULTRAM) tablet 50 mg  50 mg Oral Q6H PRN    albuterol (PROVENTIL HFA, VENTOLIN HFA, PROAIR HFA) inhaler 2 Puff  2 Puff Inhalation Q6H PRN    amLODIPine (NORVASC) tablet 5 mg  5 mg Oral DAILY    apixaban (ELIQUIS) tablet 2.5 mg  2.5 mg Oral BID    calcium-vitamin D (OS-MOOK +D3) 250 mg-125 unit per tablet 1 Tablet  1 Tablet Oral DAILY    carvediloL (COREG) tablet 3.125 mg  3.125 mg Oral BID WITH MEALS    fluticasone propionate (FLONASE) 50 mcg/actuation nasal spray 1 Spray  1 Spray Both Nostrils DAILY    furosemide (LASIX) tablet 40 mg  40 mg Oral DAILY    lisinopriL (PRINIVIL, ZESTRIL) tablet 20 mg  20 mg Oral DAILY    therapeutic multivitamin (THERAGRAN) tablet 1 Tablet  1 Tablet Oral DAILY    tamsulosin (FLOMAX) capsule 0.4 mg  0.4 mg Oral DAILY    atorvastatin (LIPITOR) tablet 20 mg  20 mg Oral QHS       Review of Systems:  ROS was obtained, with pertinent positives as listed above. No chest pain or SOB. Diet:  no change    Objective:   Vitals:  Visit Vitals  /66 (BP 1 Location: Left upper arm, BP Patient Position: At rest)   Pulse 66   Temp 97.7 °F (36.5 °C)   Resp 16   Ht 5' 3\" (1.6 m)   Wt 59.4 kg (130 lb 14.4 oz)   SpO2 96%   BMI 23.19 kg/m²     Intake/Output:  No intake/output data recorded. No intake/output data recorded. Exam:  General appearance: alert, cooperative, no distress  Abdomen: soft, non-tender.  Bowel sounds normal. No masses, no organomegaly  Neuro:  alert and oriented    Data Review (Labs):      Lab Results   Component Value Date     09/08/2021    K 3.0 (L) 09/08/2021     09/08/2021    CO2 21 09/08/2021    AGAP 9 09/08/2021    GLU 84 09/08/2021    BUN 8 (L) 09/08/2021    CREA 0.40 (L) 09/08/2021    BUCR 20 09/08/2021    GFRAA >60 09/08/2021    GFRNA >60 09/08/2021    CA 8.0 (L) 09/08/2021    TBILI 0.5 09/06/2021    AST 28 09/06/2021    ALT 19 09/06/2021    AP 59 09/06/2021    TP 6.4 09/06/2021    ALB 2.9 (L) 09/06/2021    GLOB 3.5 09/06/2021    AGRAT 0.8 09/06/2021    WBC 16.3 (H) 09/08/2021    RBC 2.99 (L) 09/08/2021    HGB 9.2 (L) 09/08/2021    HCT 28.9 (L) 09/08/2021    MCV 96.7 09/08/2021    MCH 30.8 09/08/2021    MCHC 31.8 09/08/2021    RDW 14.0 09/08/2021     09/08/2021    MPLV 9.5 09/08/2021    GRANS 89 (H) 09/06/2021    LYMPH 11 (L) 09/06/2021    MONOS 0 (L) 09/06/2021    EOS 0 09/06/2021    BASOS 0 09/06/2021    IG 0 09/06/2021 ANEU 18.4 (H) 09/06/2021    ABL 2.3 09/06/2021    ABM 0.0 (L) 09/06/2021    STEVE 0.0 09/06/2021    ABB 0.0 09/06/2021    AIG 0.0 09/06/2021   results  Lab Results   Component Value Date     09/08/2021    K 3.0 (L) 09/08/2021     09/08/2021    CO2 21 09/08/2021    AGAP 9 09/08/2021    GLU 84 09/08/2021    BUN 8 (L) 09/08/2021    CREA 0.40 (L) 09/08/2021    BUCR 20 09/08/2021    GFRAA >60 09/08/2021    GFRNA >60 09/08/2021    CA 8.0 (L) 09/08/2021    TBILI 0.5 09/06/2021    AST 28 09/06/2021    ALT 19 09/06/2021    AP 59 09/06/2021    TP 6.4 09/06/2021    ALB 2.9 (L) 09/06/2021    GLOB 3.5 09/06/2021    AGRAT 0.8 09/06/2021    WBC 16.3 (H) 09/08/2021    RBC 2.99 (L) 09/08/2021    HGB 9.2 (L) 09/08/2021    HCT 28.9 (L) 09/08/2021    MCV 96.7 09/08/2021    MCH 30.8 09/08/2021    MCHC 31.8 09/08/2021    RDW 14.0 09/08/2021     09/08/2021    MPLV 9.5 09/08/2021    GRANS 89 (H) 09/06/2021    LYMPH 11 (L) 09/06/2021    MONOS 0 (L) 09/06/2021    EOS 0 09/06/2021    BASOS 0 09/06/2021    IG 0 09/06/2021    ANEU 18.4 (H) 09/06/2021    ABL 2.3 09/06/2021    ABM 0.0 (L) 09/06/2021    STEVE 0.0 09/06/2021    ABB 0.0 09/06/2021    AIG 0.0 09/06/2021          CT Results (most recent):  Results from Hospital Encounter encounter on 09/06/21    CT CHEST W CONT    Narrative  EXAM: CT chest    CLINICAL INDICATION/HISTORY: RLL lung mass  > Additional: None. COMPARISON: None. > Reference Exam: CT abdomen one day earlier. TECHNIQUE: Axial CT imaging from the thoracic inlet through the diaphragm with  intravenous contrast. Multiplanar reformats were generated. One or more dose  reduction techniques were used on this CT: automated exposure control,  adjustment of the mAs and/or kVp according to patient size, and iterative  reconstruction techniques. The specific techniques used on this CT exam have  been documented in the patient's electronic medical record.   Digital Imaging and  Communications in Medicine (DICOM) format image data are available to  nonaffiliated external healthcare facilities or entities on a secure, media  free, reciprocally searchable basis with patient authorization for at least a  12-month period after this study. _______________    FINDINGS:    LUNGS:  > Diffuse emphysema.  > Posterior right lower lobe lung mass measures 4.1 x 4.3 cm. A large portion  of this mass is essentially low in attenuation consistent with necrosis. Adjacent pleural-based consolidation potentially postobstructive atelectasis or  infection. PLEURA: No significant pleural effusion. No pneumothorax. AIRWAY: Patent centrally. MEDIASTINUM: Normal heart size. No pericardial effusion. Great vessels  unremarkable. LYMPH NODES: Enlarged right interlobar lymph node measuring 12 mm in short axis. Enlarged right hilar lymph node measuring 12 mm in short axis. Borderline  enlarged right lower paratracheal lymph node measuring 10 mm in short axis. UPPER ABDOMEN: Hepatic steatosis. OTHER: No acute or aggressive osseous abnormalities identified. Chronic  appearing T8 or T9 compression fracture. _______________    Impression  1. Necrotic right lower lobe lung mass highly suspicious for primary lung  malignancy. Adjacent pleural-based consolidation potentially postobstructive  atelectasis or infection. 2. Metastatic lymphadenopathy to right interlobar, right hilar and potentially  right lower paratracheal lymph nodes. US Results (most recent):  No results found for this or any previous visit. MRI Results (most recent):  No results found for this or any previous visit. .    Assessment:     1. Abdominal pain and abnormal CT scan. The patient had documented acute sigmoid diverticulitis with associated intramural abscesses. A subsequent CT scan done 3 days later showed some improvement with the abscesses resolving but still with inflammation present.   CT scan now shows diffuse colitis from the distal sigmoid colon involving the rectum. It would be highly unusual for this to change from diverticulitis to some type of colitis. She is however at risk for ischemic colitis given her significant coronary artery disease and lung disease so this is a possibility. Blood cell count is elevated over 18,000. I suspect actually what is occurring is she has significant and known diverticulosis and developed significant diverticulitis with abscess formation. This is resolving although slowly. Is not realistic to expect quick resolution of something as significant as abscess formation with diverticulitis. This may be a mixed picture of ischemic colitis on top of diverticulitis. 09/08/2021: She is feeling pretty much the same although some improvement this morning before trying to eat lunch of clear liquid diet. This will likely take at least 2 weeks of antibiotic treatment orally when she is discharged in 4 to 6 weeks till she is feeling back to normal, to resolve if not longer. 2.  Anemia. This is likely due to bone marrow suppression from her present infection as well as phlebotomies which have been significant over the last week to 10 days. 3.  COPD. She has been on medication normally for this problem. 4.  Coronary artery disease. She is on Eliquis as well as other medications for significant coronary artery disease. It would not be surprising given her multiple medical problems and acute diverticulitis now, if she developed some ischemia localized to the sigmoid colon/upper rectum area given her known coronary artery disease. 5.  Lung mass. Been seen by the pulmonologist.  He feels this is likely lung cancer. She is going to discuss with her family the prognosis and evaluation and consider whether she wants to just leave things as they are or proceed with lung and metastatic disease evaluation. Plan:     1. Continue present antibiotics. Okay to use clear liquid diet.   2.  Again I feel this may take 2 weeks of oral antibiotic therapy once she is discharged to resolve and may take 4 to 6 weeks to feel back to normal.  3.  If she does not start improving, then I would have a surgeon see her as she may need sigmoid resection. I feel this is resolving diverticulitis with abscess formation and not colitis as suggested by the CT scan. 4.  No plans for colonoscopy as this is contraindicated in the face of acute diverticulitis. 5.  Follow-up with her regular gastroenterologist Dr. Isabel Frank upon discharge. I will sign off for now. Please contact me if you have further questions.       Deborah Heaton MD

## 2021-09-08 NOTE — PROGRESS NOTES
Ambulated pt to restroom. Small BM, loose. Urine yellow,clear. SOB afterwards. Pt complained of abdominal pain after doctor visit. 5/10. Pt watching tv and put in comfortable position. Pt ate 5% of lunch. She claimed she was not hungry.

## 2021-09-09 LAB
ANION GAP SERPL CALC-SCNC: 7 MMOL/L
BUN SERPL-MCNC: 5 MG/DL (ref 9–21)
BUN/CREAT SERPL: 10
CA-I BLD-MCNC: 7.8 MG/DL (ref 8.5–10.5)
CHLORIDE SERPL-SCNC: 108 MMOL/L (ref 94–111)
CO2 SERPL-SCNC: 24 MMOL/L (ref 21–33)
CREAT SERPL-MCNC: 0.5 MG/DL (ref 0.7–1.2)
ERYTHROCYTE [DISTWIDTH] IN BLOOD BY AUTOMATED COUNT: 14.1 % (ref 11.6–14.5)
GLUCOSE SERPL-MCNC: 90 MG/DL (ref 70–110)
HCT VFR BLD AUTO: 30.5 % (ref 35–45)
HGB BLD-MCNC: 9.6 G/DL (ref 12–16)
MCH RBC QN AUTO: 30.5 PG (ref 24–34)
MCHC RBC AUTO-ENTMCNC: 31.5 G/DL (ref 31–37)
MCV RBC AUTO: 96.8 FL (ref 78–100)
NRBC # BLD: 0 K/UL (ref 0–0.01)
NRBC BLD-RTO: 0 PER 100 WBC
PLATELET # BLD AUTO: 321 K/UL (ref 135–420)
PMV BLD AUTO: 9.4 FL (ref 9.2–11.8)
POTASSIUM SERPL-SCNC: 3.6 MMOL/L (ref 3.2–5.1)
RBC # BLD AUTO: 3.15 M/UL (ref 4.2–5.3)
SODIUM SERPL-SCNC: 139 MMOL/L (ref 135–145)
WBC # BLD AUTO: 9.9 K/UL (ref 4.6–13.2)

## 2021-09-09 PROCEDURE — 74011250636 HC RX REV CODE- 250/636: Performed by: NURSE PRACTITIONER

## 2021-09-09 PROCEDURE — 74011250637 HC RX REV CODE- 250/637: Performed by: NURSE PRACTITIONER

## 2021-09-09 PROCEDURE — 36415 COLL VENOUS BLD VENIPUNCTURE: CPT

## 2021-09-09 PROCEDURE — 80048 BASIC METABOLIC PNL TOTAL CA: CPT

## 2021-09-09 PROCEDURE — 65270000029 HC RM PRIVATE

## 2021-09-09 PROCEDURE — 85027 COMPLETE CBC AUTOMATED: CPT

## 2021-09-09 PROCEDURE — 74011250637 HC RX REV CODE- 250/637: Performed by: INTERNAL MEDICINE

## 2021-09-09 PROCEDURE — 97161 PT EVAL LOW COMPLEX 20 MIN: CPT

## 2021-09-09 PROCEDURE — 94761 N-INVAS EAR/PLS OXIMETRY MLT: CPT

## 2021-09-09 PROCEDURE — 77010033678 HC OXYGEN DAILY

## 2021-09-09 RX ADMIN — APIXABAN 2.5 MG: 2.5 TABLET, FILM COATED ORAL at 08:53

## 2021-09-09 RX ADMIN — POTASSIUM CHLORIDE 40 MEQ: 750 TABLET, EXTENDED RELEASE ORAL at 08:34

## 2021-09-09 RX ADMIN — Medication 10 ML: at 06:18

## 2021-09-09 RX ADMIN — LISINOPRIL 20 MG: 20 TABLET ORAL at 08:34

## 2021-09-09 RX ADMIN — AMLODIPINE BESYLATE 5 MG: 5 TABLET ORAL at 08:35

## 2021-09-09 RX ADMIN — CARVEDILOL 3.12 MG: 3.12 TABLET, FILM COATED ORAL at 16:30

## 2021-09-09 RX ADMIN — APIXABAN 2.5 MG: 2.5 TABLET, FILM COATED ORAL at 16:31

## 2021-09-09 RX ADMIN — POTASSIUM CHLORIDE 40 MEQ: 750 TABLET, EXTENDED RELEASE ORAL at 17:03

## 2021-09-09 RX ADMIN — Medication 10 ML: at 13:58

## 2021-09-09 RX ADMIN — METRONIDAZOLE 500 MG: 500 INJECTION, SOLUTION INTRAVENOUS at 23:11

## 2021-09-09 RX ADMIN — Medication 1 TABLET: at 08:35

## 2021-09-09 RX ADMIN — THERA TABS 1 TABLET: TAB at 08:35

## 2021-09-09 RX ADMIN — CARVEDILOL 3.12 MG: 3.12 TABLET, FILM COATED ORAL at 08:35

## 2021-09-09 RX ADMIN — FLUTICASONE PROPIONATE 1 SPRAY: 50 SPRAY, METERED NASAL at 08:54

## 2021-09-09 RX ADMIN — METRONIDAZOLE 500 MG: 500 INJECTION, SOLUTION INTRAVENOUS at 13:57

## 2021-09-09 RX ADMIN — FUROSEMIDE 40 MG: 40 TABLET ORAL at 08:35

## 2021-09-09 RX ADMIN — FAMOTIDINE 10 MG: 20 TABLET, FILM COATED ORAL at 08:34

## 2021-09-09 RX ADMIN — METRONIDAZOLE 500 MG: 500 INJECTION, SOLUTION INTRAVENOUS at 06:18

## 2021-09-09 RX ADMIN — TRAMADOL HYDROCHLORIDE 50 MG: 50 TABLET, FILM COATED ORAL at 23:19

## 2021-09-09 RX ADMIN — ATORVASTATIN CALCIUM 20 MG: 10 TABLET, FILM COATED ORAL at 23:10

## 2021-09-09 RX ADMIN — TAMSULOSIN HYDROCHLORIDE 0.4 MG: 0.4 CAPSULE ORAL at 08:35

## 2021-09-09 RX ADMIN — FAMOTIDINE 10 MG: 20 TABLET, FILM COATED ORAL at 17:02

## 2021-09-09 RX ADMIN — ACETAMINOPHEN 650 MG: 325 TABLET ORAL at 08:34

## 2021-09-09 NOTE — PROGRESS NOTES
Assisted pt to the bathroom and back to bed. Assisted with denture care. Pt resting in bed with oxygen in place, NAD noted. No further needs voiced at this time. CBWR.  Primary nurse aware

## 2021-09-09 NOTE — PROGRESS NOTES
0600- Pt up to use restroom and back to bed, denies any other needs, call bell in reach. 0706- Report given to oncoming nurse.

## 2021-09-09 NOTE — PROGRESS NOTES
1900- Assumed care of pt from off going nurse. 2000- Pt assisted to bathroom tolerated well but did have some dyspnea on exertion, pt is back to bed, pt is relaxed at this time, nasal cannula in place, denies any pain or discomfort at this time, bed is in lowest position, call bell in reach.

## 2021-09-09 NOTE — PROGRESS NOTES
2200- Hs meds given, pt up to use restroom before IV flagyl was hung back to bed, call bell in reach, no needs voiced. 0030- Pt resting in bed, no acute distress or sob noted, denies any needs at this time, call bell is in reach. Patient

## 2021-09-09 NOTE — PROGRESS NOTES
[address] [facility]       Progress Note     Name: Raj Duarte   : 1934   MRN: [id number]   Date: 2021    [x]I have reviewed the flowsheet and previous days notes. Events, vitals, medications and notes from last 24 hours reviewed. ASSESSMENT:   - Right sided Lung mass new since 2020  -Colitis with abdominal pain  -Hypokalemia  -Hyperlipidemia  -Paroxysmal atrial fibrillation   PLAN:   - CT scan chest personally reviewed. Highly suspicious for primary lung malignancy with mediastinal and hilar LN involvement( ipsilateral)  - Needs Tissue diagnosis when stable  - When I discussed the findings of the CT scan , she expressed\" I have had a good life and I am 80years old\"  She would like to discuss this with her family. One of her sons is coming to visit her today  - Continue management for her Colitis                 SUBJECTIVE: I am tired of laying in this bed    ROS:   Otherwise negative except for as mentioned in subjective. Allergy:  Allergies   Allergen Reactions    Codeine Nausea and Vomiting        Vital Signs:    Visit Vitals  /62   Pulse 76   Temp 98.3 °F (36.8 °C)   Resp 18   Ht 5' 3\" (1.6 m)   Wt 59.8 kg (131 lb 14.4 oz)   SpO2 98%   BMI 23.37 kg/m²       O2 Device: Nasal cannula   O2 Flow Rate (L/min): 2 l/min   Temp (24hrs), Av.8 °F (36.6 °C), Min:97.3 °F (36.3 °C), Max:98.3 °F (36.8 °C)       Patient Vitals for the past 8 hrs:   Temp Pulse Resp BP SpO2   21 0805 98.3 °F (36.8 °C) 76 18 112/62 98 %   21 0730     94 %   21 0420 98.1 °F (36.7 °C) 89 18 (!) 126/58 93 %   21 0030 97.3 °F (36.3 °C) 67 20 (!) 112/56 97 %         Intake/Output:   Last shift:      No intake/output data recorded. Last 3 shifts:  1901 -  0700  In: 320 [P.O.:120;  I.V.:200]  Out: -     Intake/Output Summary (Last 24 hours) at 2021 0811  Last data filed at 2021 0618  Gross per 24 hour   Intake 320 ml   Output    Net 320 ml Physical Exam:  HEENT: NCAT, PERRLA  Neck: supple  Chest: clear  CVS:RRR  Abd: soft, NT, ND, BS++  Ext: no edema  Neuro: non focal exam      DATA:     Current Facility-Administered Medications   Medication Dose Route Frequency    potassium chloride (KLOR-CON) tablet 40 mEq  40 mEq Oral BID    famotidine (PEPCID) tablet 10 mg  10 mg Oral BID    levoFLOXacin (LEVAQUIN) 750 mg in D5W IVPB  750 mg IntraVENous Q48H    sodium chloride (NS) flush 5-40 mL  5-40 mL IntraVENous Q8H    sodium chloride (NS) flush 5-40 mL  5-40 mL IntraVENous PRN    acetaminophen (TYLENOL) tablet 650 mg  650 mg Oral Q6H PRN    Or    acetaminophen (TYLENOL) suppository 650 mg  650 mg Rectal Q6H PRN    polyethylene glycol (MIRALAX) packet 17 g  17 g Oral DAILY PRN    ondansetron (ZOFRAN ODT) tablet 4 mg  4 mg Oral Q8H PRN    Or    ondansetron (ZOFRAN) injection 4 mg  4 mg IntraVENous Q6H PRN    metroNIDAZOLE (FLAGYL) IVPB premix 500 mg  500 mg IntraVENous Q8H    melatonin tablet 3 mg  3 mg Oral QHS PRN    traMADoL (ULTRAM) tablet 50 mg  50 mg Oral Q6H PRN    albuterol (PROVENTIL HFA, VENTOLIN HFA, PROAIR HFA) inhaler 2 Puff  2 Puff Inhalation Q6H PRN    amLODIPine (NORVASC) tablet 5 mg  5 mg Oral DAILY    apixaban (ELIQUIS) tablet 2.5 mg  2.5 mg Oral BID    calcium-vitamin D (OS-MOOK +D3) 250 mg-125 unit per tablet 1 Tablet  1 Tablet Oral DAILY    carvediloL (COREG) tablet 3.125 mg  3.125 mg Oral BID WITH MEALS    fluticasone propionate (FLONASE) 50 mcg/actuation nasal spray 1 Spray  1 Spray Both Nostrils DAILY    furosemide (LASIX) tablet 40 mg  40 mg Oral DAILY    lisinopriL (PRINIVIL, ZESTRIL) tablet 20 mg  20 mg Oral DAILY    therapeutic multivitamin (THERAGRAN) tablet 1 Tablet  1 Tablet Oral DAILY    tamsulosin (FLOMAX) capsule 0.4 mg  0.4 mg Oral DAILY    atorvastatin (LIPITOR) tablet 20 mg  20 mg Oral QHS                 Labs: Results:   Chemistry Recent Labs     09/09/21  0457 09/08/21  0453 09/07/21  0505 09/06/21  1200 09/06/21  1200   GLU 90 84 83   < > 125*    139 139   < > 140   K 3.6 3.0* 3.2   < > 3.1*    109 109   < > 105   CO2 24 21 22   < > 25   BUN 5* 8* 12   < > 8*   CREA 0.50* 0.40* 0.80   < > 0.50*   CA 7.8* 8.0* 7.8*   < > 9.0   AGAP 7 9 8   < > 10   BUCR 10 20 15   < > 16   AP  --   --   --   --  59   TP  --   --   --   --  6.4   ALB  --   --   --   --  2.9*   GLOB  --   --   --   --  3.5   AGRAT  --   --   --   --  0.8    < > = values in this interval not displayed. CBC w/Diff Recent Labs     09/09/21  0457 09/08/21  0453 09/07/21  0500 09/06/21  1200 09/06/21  1200   WBC 9.9 16.3* 18.8*   < > 20.7*   RBC 3.15* 2.99* 3.33*   < > 3.42*   HGB 9.6* 9.2* 10.4*   < > 10.8*   HCT 30.5* 28.9* 32.4*   < > 33.1*    303 313   < > 336   GRANS  --   --   --   --  89*   LYMPH  --   --   --   --  11*   EOS  --   --   --   --  0    < > = values in this interval not displayed. All Micro Results     None            Imaging:   No results found.          Drucilla Harada, MD  September 9, 2021  9:11 AM

## 2021-09-09 NOTE — PROGRESS NOTES
Progress Note    Patient: Kiel Chaudhry MRN: [id number]  SSN: [id number]    YOB: 1934  Age: 80 y.o. Sex: female      Admit Date: 9/6/2021    LOS: 3 days     Subjective:   Pt seen in follow up today. Pt states states \" I feel like shit\". She complains of diffuse abdominal pain, and is requesting to go to the bathroom. She complains of multiple loose bowel movements in the last couple hours. She is fairly tolerating her liquid diet. She denies chest pain, any worsening shortness of breath, fever or chills. She is noted to be on 2 L/min which she states she uses as needed at home. She complains of generalized weakness. No overnight events reported by nurses. Objective:     Vitals:    09/09/21 0805 09/09/21 0941 09/09/21 1134 09/09/21 1508   BP: 112/62 112/62 (!) 98/53 (!) 101/52   Pulse: 76 76 70 70   Resp: 18 18 18 18   Temp: 98.3 °F (36.8 °C) 98.3 °F (36.8 °C) 97.6 °F (36.4 °C) 97.6 °F (36.4 °C)   SpO2: 98%  98% 98%   Weight:       Height:            Intake and Output:  Current Shift: No intake/output data recorded. Last three shifts: 09/07 1901 - 09/09 0700  In: 320 [P.O.:120; I.V.:200]  Out: -     Physical Exam:   Physical Exam  Vitals and nursing note reviewed. Constitutional:       Appearance: Normal appearance. Comments: Frail elderly female, appears stated age. She is not ill-appearing, not toxic appearing. Awake, alert, oriented x3. No acute distress. HENT:      Head: Normocephalic and atraumatic. Nose: Nose normal.      Mouth/Throat:      Mouth: Mucous membranes are moist.   Eyes:      Extraocular Movements: Extraocular movements intact. Pupils: Pupils are equal, round, and reactive to light. Comments: No icterus. Cardiovascular:      Rate and Rhythm: Normal rate and regular rhythm. Pulses: Normal pulses. Heart sounds: Normal heart sounds.    Pulmonary:      Effort: Pulmonary effort is normal.      Comments: Very diminished breath sounds in bilateral upper and lower lobes. Abdominal:      General: Bowel sounds are normal.      Palpations: Abdomen is soft. Tenderness: There is abdominal tenderness. Genitourinary:     General: Normal vulva. Rectum: Normal.   Musculoskeletal:         General: Normal range of motion. Cervical back: Neck supple. Skin:     General: Skin is warm. Capillary Refill: Capillary refill takes less than 2 seconds. Neurological:      General: No focal deficit present. Mental Status: She is alert and oriented to person, place, and time. Mental status is at baseline. Motor: Weakness present. Psychiatric:         Mood and Affect: Mood normal.         Lab/Data Review:  Recent Results (from the past 24 hour(s))   METABOLIC PANEL, BASIC    Collection Time: 09/09/21  4:57 AM   Result Value Ref Range    Sodium 139 135 - 145 mmol/L    Potassium 3.6 3.2 - 5.1 mmol/L    Chloride 108 94 - 111 mmol/L    CO2 24 21 - 33 mmol/L    Anion gap 7 mmol/L    Glucose 90 70 - 110 mg/dL    BUN 5 (L) 9 - 21 mg/dL    Creatinine 0.50 (L) 0.70 - 1.20 mg/dL    BUN/Creatinine ratio 10      GFR est AA >60 ml/min/1.73m2    GFR est non-AA >60 ml/min/1.73m2    Calcium 7.8 (L) 8.5 - 10.5 mg/dL   CBC W/O DIFF    Collection Time: 09/09/21  4:57 AM   Result Value Ref Range    WBC 9.9 4.6 - 13.2 K/uL    RBC 3.15 (L) 4.20 - 5.30 M/uL    HGB 9.6 (L) 12.0 - 16.0 g/dL    HCT 30.5 (L) 35.0 - 45.0 %    MCV 96.8 78.0 - 100.0 FL    MCH 30.5 24.0 - 34.0 PG    MCHC 31.5 31.0 - 37.0 g/dL    RDW 14.1 11.6 - 14.5 %    PLATELET 043 191 - 352 K/uL    MPV 9.4 9.2 - 11.8 FL    NRBC 0.0 0.0  WBC    ABSOLUTE NRBC 0.00 0.00 - 0.01 K/uL       Assessment:     Principal Problem:    Colitis (9/6/2021)    Active Problems:    Chronic obstructive lung disease (HCC) ()      Overview: 11- visitsees pulmonology (Dr. Usha Goodson) for for COPD.      no signs of exacerbation.             REQUESTING ORFDER FOR CXR ( MISSED THE ORDER DONE BY DR Vanessa Haines FOR FU LEONARDO)      Last modified by [id number]  10-, 09:13      Congestive heart failure (CHF) ([facility]) ()      Overview: With acid            08- visitSees cardiology. No issues. Last modified by DELORES Welch  10-, 09:13      Essential hypertension ()      Overview: 04- visitBP stable. continue meds      -Discussed checking BP at home occasionally and recording for f/u visit.      -Patient is aware to call our office if BP is greater than 150/90 mmHg or       less than 100/60 mmHg.      -Discussed low salt dieting and exercising 150 minutes/week. Last modified by DELORES Welch  10-, 09:13      Gastroesophageal reflux disease ()      Overview: 10- visitnot controlled      change Zantac to Pepcid      Last modified by [facility]  10-, 09:13      Hyperlipidemia ()      Urinary retention (9/6/2021)      Paroxysmal atrial fibrillation ([facility] [address] (9/6/2021)      Abdominal pain (9/6/2021)      Lung mass (9/6/2021)      Hypokalemia (9/6/2021)        Plan:   #1: Colitis with abdominal pain:  -Seen and evaluated by GI, Dr. Radha Quinonez, with recommendations to continue oral antibiotics in the next 2 weeks, upon discharge. .  -Leukocytosis improved. afebrile.   -However still with diffuse abdominal pain, will keep on IV antibiotics with Levaquin and Flagyl, next 24-48 hours.  -Continue clear liquids, advance as tolerated. -PT/OT evaluation and treatment. #2: Right-sided lung mass, new finding per CT 9/6/21:  - Highly suspicious for primary lung malignancy with mediastinal and hilar LN involvement (ipsilateral)  -Pulmonology has been consulted. See notes. #3: Hypokalemia:   -Resolved. #4: Paroxysmal atrial fibrillation:  -Chronic issue, stable.   -Hx pacemaker, AV paced on tele. -continue carvedilol and Eliquis. #5: Hypertension:  -Chronic issue, normotensive.   -BP on the low end of normal, hold amlodipine and lisinopril. #6: COPD/emphysema:  -Chronic issue, has home O2 at 2 L/min.  -Stable, not in acute exacerbation. #7: Hyperlipidemia:  -Chronic issue, on statin. VTE prophylaxis: Eliquis. CODE STATUS: DO NOT RESUSCITATE. Total time spent: 35 minutes. Discharge disposition: Anticipate DC home with [place] in 1 to 2 days, when symptoms improved.         Signed By: Ritu Lewis NP     September 9, 2021

## 2021-09-09 NOTE — PROGRESS NOTES
Assumed care of pt. Assessed pt, VSS, pt's daughter is at the bedside. No voiced concerns at this time. CBWR.     7767- Scheduled medications given per MAR.  No voiced concerns at this time, CBWR.     6020- prn pain medication administered per pt request.

## 2021-09-09 NOTE — PROGRESS NOTES
0200- Pt sleeping during rounding, no acute distress or sob. Call bell is in reach. 0400- Pt resting in bed, assisted to bathroom and back to bed, call bell in reach.

## 2021-09-09 NOTE — PROGRESS NOTES
0700: Received care of patient during walking rounds. A+O x 4. Respirations even and unlabored. Denies SOB. Denies CP. C/o abdomin pain. Rated 4/10 on numeric pain scale. Denies further needs. CBWR.     0827: Breakfast tray provided. Patient started on clears. Am medications administered per order. Swallowed pills easily. Pain reported increased ABD pain to LLQ. Rated 7/10 on numeric pain scale. Administered tylenol for pain. BM this am, solid in appearance. 1130: Patient provided clear liquid trays. Eating independently. Patient c/o feeling tired and has no energy. Resting in bed, denies further needs. 1449: New IV site to right hand # 22 inserted by DAYTON Ring rn.     1600: Assisted patient to the bathroom. Large void. Repositioned back into bed for comfort. Denies further needs. CBWR.    1800: Assisted to bathroom. Small formed BM. Repositioned back into bed for comfort. Fresh water provided. CBWR. Bed alarm ON.

## 2021-09-09 NOTE — PROGRESS NOTES
Problem: Mobility Impaired (Adult and Pediatric)  Goal: *Acute Goals and Plan of Care (Insert Text)  Description: Physical Therapy Goals  Initiated 9/9/2021 and to be accomplished within 7 day(s)  1. Patient will move from supine to sit and sit to supine , scoot up and down, and roll side to side in bed with modified independence. 2.  Patient will transfer from bed to chair and chair to bed with modified independence using the least restrictive device. 3.  Patient will perform sit to stand with modified independence. 4.  Patient will ambulate with modified independence for 100 feet with the least restrictive device. 5.  Patient will ascend/descend 4 stairs with 2 handrail(s) with minimal assistance/contact guard assist.    PLOF: Community ambulator who used a cane or RW PRN and who was (I) with ADLs. Outcome: Progressing Towards Goal   PHYSICAL THERAPY EVALUATION    Patient: Matt Toro (54 y.o. female)  Date: 9/9/2021  Primary Diagnosis: Colitis [K52.9]        Precautions: Other (comment) (Monitor SpO2)    ASSESSMENT :  Based on the objective data described below, the patient presents with colitis and accidental finding of a R lung mass. She is on 2L at rest but states she has been ambulating to the bathroom without it. She also reports using 2L normally at night at home. She performs mobility fairly well but does c/o severe dyspnea after gait. Her SpO2 remains normal during ambulation but does drop when she sits to rest to 89-90%. Patient would benefit from further P.T. to improve strength, gait, and stair navigation. They would likely benefit from home health once medically stable. Patient will benefit from skilled intervention to address the above impairments.   Patient's rehabilitation potential is considered to be Good  Factors which may influence rehabilitation potential include:   []         None noted  []         Mental ability/status  [x]         Medical condition  [] Home/family situation and support systems  []         Safety awareness  []         Pain tolerance/management  []         Other:      PLAN :  Recommendations and Planned Interventions:   [x]           Bed Mobility Training             []    Neuromuscular Re-Education  [x]           Transfer Training                   []    Orthotic/Prosthetic Training  [x]           Gait Training                          []    Modalities  [x]           Therapeutic Exercises           []    Edema Management/Control  [x]           Therapeutic Activities            []    Family Training/Education  [x]           Patient Education  []           Other (comment):    Frequency/Duration: Patient will be followed by physical therapy 1-2 times per day/4-7 days per week to address goals. Discharge Recommendations: Home Health and Home Physical Therapy  Further Equipment Recommendations for Discharge: N/A     SUBJECTIVE:   Patient stated i'm just having a hard time breathing.     OBJECTIVE DATA SUMMARY:     Past Medical History:   Diagnosis Date    Arthritis     Asthma     CAD (coronary artery disease)     Heart disease     High cholesterol     Hyperlipidemia      Past Surgical History:   Procedure Laterality Date    HX APPENDECTOMY      HX COLONOSCOPY  01/26/2015    per gi no further colonoscopy needed due to age of the patient, see note 1/26/15    HX HERNIA REPAIR Left 2001    inguinal    HX HYSTERECTOMY      OR CARDIAC SURG PROCEDURE UNLIST  2007    defibrillator placed     Barriers to Learning/Limitations: None  Compensate with: N/A  Home Situation:  Home Situation  Home Environment: Private residence  # Steps to Enter: 3  Rails to Enter: Yes  Hand Rails : Bilateral  One/Two Story Residence: One story  Living Alone: No  Support Systems: Other Family Member(s)  Patient Expects to be Discharged to[de-identified] House  Current DME Used/Available at Home: Sagar Beckett, susi, Cane, straight  Critical Behavior:  Neurologic State: Alert  Orientation Level: Oriented X4        Psychosocial  Purposeful Interaction: Yes  Pt Identified Daily Priority: Clinical issues (comment)  Car[employer] Process: Establish trust;Teaching/learning  Caring Interventions: Reassure  Strength:    Strength: Generally decreased, functional  RUE: 4/5  LUE: 4/5  RLE: 4/5  LLE: 4/5  Tone & Sensation:   Sensation: Intact  Range Of Motion:   AROM: Within functional limits  Posture:  Posture (WDL): Within defined limits  Functional Mobility:  Bed Mobility:  Rolling: Modified independent  Supine to Sit: Modified independent  Sit to Supine: Modified independent  Scooting: Modified independent  Transfers:  Sit to Stand: Contact guard assistance  Stand to Sit: Contact guard assistance  Balance:   Sitting: Intact  Standing: Intact  Ambulation/Gait Training:  Distance (ft): 35 Feet (ft) (25'x2, on R. A. SpO2 93-94% during after at 89-90%)  Assistive Device: Walker, rolling  Ambulation - Level of Assistance: Contact guard assistance  Gait Description (WDL): Exceptions to WDL  Gait Abnormalities: Other (flexed posture)  Pain:  Pain level pre-treatment: 7-8/10   Pain level post-treatment: 7-8/10   Pain Location: Abdomen  Pain Intervention(s) : Medication (see MAR); Rest, Ice, Repositioning  Response to intervention: Nurse notified, See doc flow    Activity Tolerance:   Poor  Please refer to the flowsheet for vital signs taken during this treatment. After treatment:   []         Patient left in no apparent distress sitting up in chair  [x]         Patient left in no apparent distress in bed  [x]         Call bell left within reach  []         Nursing notified  []         Caregiver present  []         Bed alarm activated  []         SCDs applied    COMMUNICATION/EDUCATION:   [x]         Role of Physical Therapy in the acute care setting. []         Fall prevention education was provided and the patient/caregiver indicated understanding.   [x]         Patient/family have participated as able in goal setting and plan of care. [x]         Patient/family agree to work toward stated goals and plan of care. []         Patient understands intent and goals of therapy, but is neutral about his/her participation. []         Patient is unable to participate in goal setting/plan of care: ongoing with therapy staff.  []         Other:     Thank you for this referral.  Priscilla Gomez, PT, DPT   Time Calculation: 24 mins

## 2021-09-10 PROCEDURE — 77010033678 HC OXYGEN DAILY

## 2021-09-10 PROCEDURE — 74011250637 HC RX REV CODE- 250/637: Performed by: NURSE PRACTITIONER

## 2021-09-10 PROCEDURE — 65270000029 HC RM PRIVATE

## 2021-09-10 PROCEDURE — 74011250637 HC RX REV CODE- 250/637: Performed by: INTERNAL MEDICINE

## 2021-09-10 PROCEDURE — 74011250636 HC RX REV CODE- 250/636: Performed by: NURSE PRACTITIONER

## 2021-09-10 PROCEDURE — 94761 N-INVAS EAR/PLS OXIMETRY MLT: CPT

## 2021-09-10 PROCEDURE — 97530 THERAPEUTIC ACTIVITIES: CPT

## 2021-09-10 RX ORDER — METRONIDAZOLE 250 MG/1
500 TABLET ORAL EVERY 8 HOURS
Status: DISCONTINUED | OUTPATIENT
Start: 2021-09-10 | End: 2021-09-11

## 2021-09-10 RX ORDER — METRONIDAZOLE 250 MG/1
500 TABLET ORAL EVERY 8 HOURS
Status: DISCONTINUED | OUTPATIENT
Start: 2021-09-10 | End: 2021-09-10

## 2021-09-10 RX ORDER — LEVOFLOXACIN 5 MG/ML
750 INJECTION, SOLUTION INTRAVENOUS
Status: DISCONTINUED | OUTPATIENT
Start: 2021-09-10 | End: 2021-09-10

## 2021-09-10 RX ORDER — METRONIDAZOLE 250 MG/1
750 TABLET ORAL
Status: DISCONTINUED | OUTPATIENT
Start: 2021-09-11 | End: 2021-09-10 | Stop reason: CLARIF

## 2021-09-10 RX ADMIN — ATORVASTATIN CALCIUM 20 MG: 10 TABLET, FILM COATED ORAL at 22:54

## 2021-09-10 RX ADMIN — Medication 1 TABLET: at 09:17

## 2021-09-10 RX ADMIN — TAMSULOSIN HYDROCHLORIDE 0.4 MG: 0.4 CAPSULE ORAL at 09:16

## 2021-09-10 RX ADMIN — CARVEDILOL 3.12 MG: 3.12 TABLET, FILM COATED ORAL at 09:17

## 2021-09-10 RX ADMIN — METRONIDAZOLE 500 MG: 250 TABLET ORAL at 18:17

## 2021-09-10 RX ADMIN — METRONIDAZOLE 500 MG: 250 TABLET ORAL at 22:54

## 2021-09-10 RX ADMIN — POTASSIUM CHLORIDE 40 MEQ: 750 TABLET, EXTENDED RELEASE ORAL at 18:14

## 2021-09-10 RX ADMIN — Medication 10 ML: at 00:04

## 2021-09-10 RX ADMIN — METRONIDAZOLE 500 MG: 500 INJECTION, SOLUTION INTRAVENOUS at 09:19

## 2021-09-10 RX ADMIN — POTASSIUM CHLORIDE 40 MEQ: 750 TABLET, EXTENDED RELEASE ORAL at 09:16

## 2021-09-10 RX ADMIN — APIXABAN 2.5 MG: 2.5 TABLET, FILM COATED ORAL at 18:14

## 2021-09-10 RX ADMIN — ONDANSETRON 4 MG: 4 TABLET, ORALLY DISINTEGRATING ORAL at 20:54

## 2021-09-10 RX ADMIN — FLUTICASONE PROPIONATE 1 SPRAY: 50 SPRAY, METERED NASAL at 09:30

## 2021-09-10 RX ADMIN — APIXABAN 2.5 MG: 2.5 TABLET, FILM COATED ORAL at 09:17

## 2021-09-10 RX ADMIN — FUROSEMIDE 40 MG: 40 TABLET ORAL at 09:16

## 2021-09-10 RX ADMIN — FAMOTIDINE 10 MG: 20 TABLET, FILM COATED ORAL at 18:14

## 2021-09-10 RX ADMIN — CARVEDILOL 3.12 MG: 3.12 TABLET, FILM COATED ORAL at 18:17

## 2021-09-10 RX ADMIN — FAMOTIDINE 10 MG: 20 TABLET, FILM COATED ORAL at 09:16

## 2021-09-10 RX ADMIN — THERA TABS 1 TABLET: TAB at 09:17

## 2021-09-10 RX ADMIN — Medication 10 ML: at 15:56

## 2021-09-10 RX ADMIN — LEVOFLOXACIN 750 MG: 500 TABLET, FILM COATED ORAL at 18:14

## 2021-09-10 NOTE — PROGRESS NOTES
Assumed care of pt.    1945- assessed pt, no voiced concerns at this time, CBWR.     2045- pt c/o nausea, prn zofran medication given. 2145- pt states nausea is better, no voiced concerns at this time, CBWR.     2255- scheduled medications given per MAR. CBWR.     0000- rounded on pt, resting with eyes closed. Door open CBWR    0400- rounded on pt, resting with eyes closed. Door open CBWR    486 3018- scheduled and prn medication given per MAR.

## 2021-09-10 NOTE — PROGRESS NOTES
Provided patients daughter Mikayla Dobson [phone] an update. Request for MD to call her back prior to discharge.

## 2021-09-10 NOTE — PROGRESS NOTES
Hospitalist Progress Note             Date of Service:  9/10/2021  NAME:  Nancy Freeman  :  1934  MRN:  [id number]      Assessment & Plan:       Plan:   #1: Colitis with abdominal pain:  -Seen and evaluated by GI, Dr. Yevgeniy Ulrich, with recommendations to continue oral antibiotics in the next 2 weeks, upon discharge. .  -Leukocytosis improved. afebrile.   -However still with diffuse abdominal pain, will keep on IV antibiotics with Levaquin and Flagyl, next 24-48 hours.  -Continue clear liquids, advance as tolerated. -PT/OT evaluation and treatment.     #2: Right-sided lung mass, new finding per CT 21:  - Highly suspicious for primary lung malignancy with mediastinal and hilar LN involvement (ipsilateral)  -Pulmonology has been consulted. See notes.     #3: Hypokalemia:   -Resolved.     #4: Paroxysmal atrial fibrillation:  -Chronic issue, stable.   -Hx pacemaker, AV paced on tele. -continue carvedilol and Eliquis.     #5: Hypertension:  -Chronic issue, normotensive. -BP on the low end of normal, hold amlodipine and lisinopril.     #6: COPD/emphysema:  -Chronic issue, has home O2 at 2 L/min.  -Stable, not in acute exacerbation.       #7: Hyperlipidemia:  -Chronic issue, on statin. abd pain greatly resolved, but very weak with PT today, says quite sob. Plan for dc tomorrow after 6 min walk on 2 liters home 02, and review labs. She is being set up to follow up with Dr Harman Henderson for her lung mass, ifi she would like to pursue treatment she will need biopsy once stable.     Hospital Problems  Date Reviewed: 2021        Codes Class Noted POA    * (Principal) Colitis ICD-10-CM: K52.9  ICD-9-CM: 558.9  2021 Yes        Urinary retention ICD-10-CM: R33.9  ICD-9-CM: 788.20  2021 Unknown        Paroxysmal atrial fibrillation ([facility] [address] ICD-10-CM: I48.0  ICD-9-CM: 427.31  2021 Unknown        Abdominal pain ICD-10-CM: R10.9  ICD-9-CM: 789.00  9/6/2021 Unknown        Lung mass ICD-10-CM: R91.8  ICD-9-CM: 786.6  9/6/2021 Unknown        Hypokalemia ICD-10-CM: E87.6  ICD-9-CM: 276.8  9/6/2021 Unknown        Chronic obstructive lung disease ([facility] ICD-10-CM: J44.9  ICD-9-CM: 003  Unknown Yes    Overview Signed 9/1/2020 12:00 PM by Radha Mckeon     11- visitsees pulmonology (Dr. Jessica Padgett) for for COPD.  no signs of exacerbation. REQUESTING ORFDER FOR CXR ( MISSED THE ORDER DONE BY DR Luisa Patricio FOR FU LEONARDO)  Last modified by William Dexter  10-, 09:13             Congestive heart failure (CHF) ([facility] ICD-10-CM: I50.9  ICD-9-CM: 428.0  Unknown Yes    Overview Signed 9/1/2020 12:01 PM by Radha Mckeon     With acid    08- visitSees cardiology. No issues. Last modified by DELORES Coombs  10-, 09:13             Essential hypertension ICD-10-CM: I10  ICD-9-CM: 401.9  Unknown Yes    Overview Signed 9/1/2020 12:03 PM by Radha Mckeon     04- visitBP stable. continue meds  -Discussed checking BP at home occasionally and recording for f/u visit.  -Patient is aware to call our office if BP is greater than 150/90 mmHg or less than 100/60 mmHg.  -Discussed low salt dieting and exercising 150 minutes/week. Last modified by DELORES Coombs  10-, 09:13             Gastroesophageal reflux disease ICD-10-CM: K21.9  ICD-9-CM: 530.81  Unknown Yes    Overview Signed 9/1/2020 12:03 PM by Radha Mckeon     10- visitnot controlled  change Zantac to Pepcid  Last modified by DELORES Coombs  10-, 09:13             Hyperlipidemia ICD-10-CM: E78.5  ICD-9-CM: 272.4  Unknown Yes                Review of Systems:   A comprehensive review of systems was negative except for that written in the HPI. My lgs hurt, sob and weak with ambulation    Vital Signs:    Last 24hrs VS reviewed since prior progress note.  Most recent are:  Visit Vitals  /60   Pulse 84   Temp 97.6 °F (36.4 °C)   Resp 14   Ht 5' 3\" (1.6 m)   Wt 59.4 kg (131 lb)   SpO2 95%   BMI 23.21 kg/m²       No intake or output data in the 24 hours ending 09/10/21 1240     Physical Examination:             General:          Alert, cooperative, no distress, appears stated age. HEENT:           Atraumatic, anicteric sclerae, pink conjunctivae                          No oral ulcers, mucosa moist, throat clear, dentition fair  Neck:               Supple, symmetrical  Lungs:             Clear to auscultation bilaterally. No Wheezing or Rhonchi. No rales. Chest wall:      No tenderness  No Accessory muscle use. Heart:              Regular  rhythm,  No  murmur   No edema  Abdomen:        Soft, mild tender. Not distended. Bowel sounds normal  Extremities:     No cyanosis. No clubbing,                            Skin turgor normal, Capillary refill normal  Skin:                Not pale. Not Jaundiced  No rashes   Psych:             Not anxious or agitated. Neurologic:      Alert, moves all extremities, answers questions appropriately and responds to commands        Data Review:    Review and/or order of clinical lab test  Review and/or order of tests in the radiology section of CPT  Review and/or order of tests in the medicine section of CPT      Labs:     Recent Labs     09/09/21 0457 09/08/21 0453   WBC 9.9 16.3*   HGB 9.6* 9.2*   HCT 30.5* 28.9*    303     Recent Labs     09/09/21 0457 09/08/21 0453    139   K 3.6 3.0*    109   CO2 24 21   BUN 5* 8*   CREA 0.50* 0.40*   GLU 90 84   CA 7.8* 8.0*   MG  --  1.8     No results for input(s): ALT, AP, TBIL, TBILI, TP, ALB, GLOB, GGT, AML, LPSE in the last 72 hours. No lab exists for component: SGOT, GPT, AMYP, HLPSE  No results for input(s): INR, PTP, APTT, INREXT in the last 72 hours. No results for input(s): FE, TIBC, PSAT, FERR in the last 72 hours.    No results found for: FOL, RBCF   No results for input(s): PH, PCO2, PO2 in the last 72 hours. No results for input(s): CPK, CKNDX, TROIQ in the last 72 hours.     No lab exists for component: CPKMB  Lab Results   Component Value Date/Time    Cholesterol, total 147 03/18/2021 11:27 AM    HDL Cholesterol 82 03/18/2021 11:27 AM    LDL, calculated 45.6 03/18/2021 11:27 AM    Triglyceride 97 03/18/2021 11:27 AM    CHOL/HDL Ratio 1.8 03/18/2021 11:27 AM     No results found for: GLUCPOC  No results found for: COLOR, APPRN, SPGRU, REFSG, ANDREY, PROTU, GLUCU, KETU, BILU, UROU, SUSAN, LEUKU, GLUKE, EPSU, BACTU, WBCU, RBCU, CASTS, UCRY      Medications Reviewed:     Current Facility-Administered Medications   Medication Dose Route Frequency    potassium chloride (KLOR-CON) tablet 40 mEq  40 mEq Oral BID    famotidine (PEPCID) tablet 10 mg  10 mg Oral BID    levoFLOXacin (LEVAQUIN) 750 mg in D5W IVPB  750 mg IntraVENous Q48H    sodium chloride (NS) flush 5-40 mL  5-40 mL IntraVENous Q8H    sodium chloride (NS) flush 5-40 mL  5-40 mL IntraVENous PRN    acetaminophen (TYLENOL) tablet 650 mg  650 mg Oral Q6H PRN    Or    acetaminophen (TYLENOL) suppository 650 mg  650 mg Rectal Q6H PRN    polyethylene glycol (MIRALAX) packet 17 g  17 g Oral DAILY PRN    ondansetron (ZOFRAN ODT) tablet 4 mg  4 mg Oral Q8H PRN    Or    ondansetron (ZOFRAN) injection 4 mg  4 mg IntraVENous Q6H PRN    metroNIDAZOLE (FLAGYL) IVPB premix 500 mg  500 mg IntraVENous Q8H    melatonin tablet 3 mg  3 mg Oral QHS PRN    traMADoL (ULTRAM) tablet 50 mg  50 mg Oral Q6H PRN    albuterol (PROVENTIL HFA, VENTOLIN HFA, PROAIR HFA) inhaler 2 Puff  2 Puff Inhalation Q6H PRN    [Held by provider] amLODIPine (NORVASC) tablet 5 mg  5 mg Oral DAILY    apixaban (ELIQUIS) tablet 2.5 mg  2.5 mg Oral BID    calcium-vitamin D (OS-MOOK +D3) 250 mg-125 unit per tablet 1 Tablet  1 Tablet Oral DAILY    carvediloL (COREG) tablet 3.125 mg  3.125 mg Oral BID WITH MEALS    fluticasone propionate (FLONASE) 50 mcg/actuation nasal spray 1 Spray  1 Spray Both Nostrils DAILY    furosemide (LASIX) tablet 40 mg  40 mg Oral DAILY    [Held by provider] lisinopriL (PRINIVIL, ZESTRIL) tablet 20 mg  20 mg Oral DAILY    therapeutic multivitamin (THERAGRAN) tablet 1 Tablet  1 Tablet Oral DAILY    tamsulosin (FLOMAX) capsule 0.4 mg  0.4 mg Oral DAILY    atorvastatin (LIPITOR) tablet 20 mg  20 mg Oral QHS     ______________________________________________________________________  EXPECTED LENGTH OF STAY: 2d 14h  ACTUAL LENGTH OF STAY:          4                 Dat Sotelo MD

## 2021-09-10 NOTE — PROGRESS NOTES
Comprehensive Nutrition Assessment    Type and Reason for Visit: reasssessment    Nutrition Recommendations/Plan: advanced to full liquids provide ensure protein max TID    Nutrition Assessment:  81 yo female PMH: COPD, Af-ib, HTN, HLD, CHF, CAD, recent diverticulitis   recent diverticulitis has persistant LLQ pain x 2 weeks with fever, chills, weakness, lightheadedness, nausea mucoid stools. Being treated with IVF IV ABx and pumonology consult for incidental finding of right lower lobe lung mass. Currently on clear liquids diet no FR currently does have hx of CHF with chronic SOB. Albumin 2.9, K+ 3.1 low likely due to poor intake. Pt received 20 mEq oral K+ and will start ensure clear TID for low albumin can consider changing supplement once diet is advanced. 9/10/2021 K+3.6 advanced to full liquids diet upgrade supplement to ensure protein max TID. Pt with lung mass needs biopsy once stable suspicious of lung malignancy. Pt weak PT consult pt discharge tomorrow [facility]. No constipation having consistent BM    No results found for this or any previous visit (from the past 24 hour(s)). Malnutrition Assessment:  Malnutrition Status:   Moderate malnutrition (LLQ pain x 2 weeks related to diverticulitis)    Context:  Acute illness     Findings of the 6 clinical characteristics of malnutrition:   Energy Intake:  7 - 50% or less of est energy requirements for 5 or more days  Weight Loss:  Unable to assess     Body Fat Loss:  No significant body fat loss,     Muscle Mass Loss:  No significant muscle mass loss,    Fluid Accumulation:  No significant fluid accumulation,     Strength:  Not performed         Estimated Daily Nutrient Needs:  Energy (kcal): 7961-7721 kcal/day; Weight Used for Energy Requirements: Admission (59 kg)  Protein (g): 59-71 g/day; Weight Used for Protein Requirements: Admission (1-1.2 g/kg)  Fluid (ml/day): 5905-3909 mL/day; Method Used for Fluid Requirements: 1 ml/kcal      Nutrition Related Findings:  recent diverticulitis has persistant LLQ pain x 2 weeks with fever, chills, weakness, lightheadedness, nausea mucoid stools. Being treated with IVF IV ABx and pumonology consult for incidental finding of right lower lobe lung mass. Wounds:    None       Current Nutrition Therapies:  ADULT ORAL NUTRITION SUPPLEMENT Breakfast, Lunch, Dinner; Clear Liquid  ADULT DIET Full Liquid    Anthropometric Measures:  · Height:  5' 3\" (160 cm)  · Current Body Wt:  58.5 kg (129 lb)   · Admission Body Wt:  129 lb    · Usual Body Wt:        · Ideal Body Wt:  115 lbs:  112.2 %   · Adjusted Body Weight:   ; Weight Adjustment for: No adjustment   · Adjusted BMI:       · BMI Category:  Normal weight (BMI 18.5-24. 9)       Nutrition Diagnosis:   · Inadequate oral intake, Altered GI function related to altered GI function, other (specify) (reduced appetite) as evidenced by nausea, poor intake prior to admission, lab values      Nutrition Interventions:   Food and/or Nutrient Delivery: Continue current diet, Start oral nutrition supplement  Nutrition Education and Counseling: Education not appropriate  Coordination of Nutrition Care: Continue to monitor while inpatient    Goals:  Pt to eat > 75% of meals will be able tolerate diet advancement with in 3 days, BM q 1-3 days, BMI 18.5-24.9, K+ 3.5-5.1, albumin > 3.5       Nutrition Monitoring and Evaluation:   Behavioral-Environmental Outcomes:    Food/Nutrient Intake Outcomes: Food and nutrient intake, Supplement intake, Diet advancement/tolerance  Physical Signs/Symptoms Outcomes: Biochemical data, Meal time behavior, Weight, GI status, Nausea/vomiting, Diarrhea     F/U:9/13/2021    Discharge Planning:     Too soon to determine     Electronically signed by Dot Parents on 9/10/2021 at 3:59 PM    Contact: SUZAN [phone]

## 2021-09-10 NOTE — PROGRESS NOTES
Rn called stating patient had lost IV access, and had been difficult to obtain another IV. We will transition to oral Abx at this time. She is tolerating her full liguid diet, without episodes of nausea or vomitng. Pt states her abdominal pain is improvedPlan communicated with pt and RN.

## 2021-09-10 NOTE — PROGRESS NOTES
Problem: Falls - Risk of  Goal: *Absence of Falls  Description: Document Magui Albrighten Fall Risk and appropriate interventions in the flowsheet. Outcome: Progressing Towards Goal  Note: Fall Risk Interventions:  Mobility Interventions: Patient to call before getting OOB         Medication Interventions: Patient to call before getting OOB    Elimination Interventions: Call light in reach              Problem: Patient Education: Go to Patient Education Activity  Goal: Patient/Family Education  Outcome: Progressing Towards Goal     Problem: Nutrition Deficit  Goal: *Optimize nutritional status  Outcome: Progressing Towards Goal     Problem: Pressure Injury - Risk of  Goal: *Prevention of pressure injury  Description: Document Jeffrey Scale and appropriate interventions in the flowsheet.   Outcome: Progressing Towards Goal  Note: Pressure Injury Interventions:       Moisture Interventions: Limit adult briefs    Activity Interventions: Increase time out of bed    Mobility Interventions: PT/OT evaluation    Nutrition Interventions: Document food/fluid/supplement intake    Friction and Shear Interventions: Minimize layers                Problem: Patient Education: Go to Patient Education Activity  Goal: Patient/Family Education  Outcome: Progressing Towards Goal     Problem: Patient Education: Go to Patient Education Activity  Goal: Patient/Family Education  Outcome: Progressing Towards Goal

## 2021-09-10 NOTE — PROGRESS NOTES
[facility]       Progress Note     Name: Kira Louie   : 1934   MRN: [id number]   Date: 9/10/2021    [x]I have reviewed the flowsheet and previous days notes. Events, vitals, medications and notes from last 24 hours reviewed. ASSESSMENT:   - Right sided Lung mass new since 2020  -Colitis with abdominal pain  -Hypokalemia  -Hyperlipidemia  -Paroxysmal atrial fibrillation   PLAN:   - CT scan chest personally reviewed. Highly suspicious for primary lung malignancy with mediastinal and hilar LN involvement( ipsilateral)  - Needs Tissue diagnosis when stable  -We will sign off                 SUBJECTIVE: I am tired of laying in this bed    ROS:   Otherwise negative except for as mentioned in subjective. Allergy:  Allergies   Allergen Reactions    Codeine Nausea and Vomiting        Vital Signs:    Visit Vitals  /60   Pulse 84   Temp 97.6 °F (36.4 °C)   Resp 14   Ht 5' 3\" (1.6 m)   Wt 59.4 kg (131 lb)   SpO2 95%   BMI 23.21 kg/m²       O2 Device: Nasal cannula   O2 Flow Rate (L/min): 2 l/min   Temp (24hrs), Av.4 °F (36.3 °C), Min:97.2 °F (36.2 °C), Max:97.6 °F (36.4 °C)       Patient Vitals for the past 8 hrs:   Temp Pulse Resp BP SpO2   09/10/21 1202 97.6 °F (36.4 °C) 84 14 104/60 95 %   09/10/21 0824 97.2 °F (36.2 °C) 94 14 119/68 95 %   09/10/21 0803     95 %         Intake/Output:   Last shift:      No intake/output data recorded. Last 3 shifts:  1901 - 09/10 0700  In: 320 [P.O.:120;  I.V.:200]  Out: -   No intake or output data in the 24 hours ending 09/10/21 1442       Physical Exam:  HEENT: NCAT, PERRLA  Neck: supple  Chest: clear  CVS:RRR  Abd: soft, NT, ND, BS++  Ext: no edema  Neuro: non focal exam      DATA:     Current Facility-Administered Medications   Medication Dose Route Frequency    potassium chloride (KLOR-CON) tablet 40 mEq  40 mEq Oral BID    famotidine (PEPCID) tablet 10 mg  10 mg Oral BID    levoFLOXacin (LEVAQUIN) 750 mg in D5W IVPB  750 mg IntraVENous Q48H    sodium chloride (NS) flush 5-40 mL  5-40 mL IntraVENous Q8H    sodium chloride (NS) flush 5-40 mL  5-40 mL IntraVENous PRN    acetaminophen (TYLENOL) tablet 650 mg  650 mg Oral Q6H PRN    Or    acetaminophen (TYLENOL) suppository 650 mg  650 mg Rectal Q6H PRN    polyethylene glycol (MIRALAX) packet 17 g  17 g Oral DAILY PRN    ondansetron (ZOFRAN ODT) tablet 4 mg  4 mg Oral Q8H PRN    Or    ondansetron (ZOFRAN) injection 4 mg  4 mg IntraVENous Q6H PRN    metroNIDAZOLE (FLAGYL) IVPB premix 500 mg  500 mg IntraVENous Q8H    melatonin tablet 3 mg  3 mg Oral QHS PRN    traMADoL (ULTRAM) tablet 50 mg  50 mg Oral Q6H PRN    albuterol (PROVENTIL HFA, VENTOLIN HFA, PROAIR HFA) inhaler 2 Puff  2 Puff Inhalation Q6H PRN    [Held by provider] amLODIPine (NORVASC) tablet 5 mg  5 mg Oral DAILY    apixaban (ELIQUIS) tablet 2.5 mg  2.5 mg Oral BID    calcium-vitamin D (OS-MOOK +D3) 250 mg-125 unit per tablet 1 Tablet  1 Tablet Oral DAILY    carvediloL (COREG) tablet 3.125 mg  3.125 mg Oral BID WITH MEALS    fluticasone propionate (FLONASE) 50 mcg/actuation nasal spray 1 Spray  1 Spray Both Nostrils DAILY    furosemide (LASIX) tablet 40 mg  40 mg Oral DAILY    [Held by provider] lisinopriL (PRINIVIL, ZESTRIL) tablet 20 mg  20 mg Oral DAILY    therapeutic multivitamin (THERAGRAN) tablet 1 Tablet  1 Tablet Oral DAILY    tamsulosin (FLOMAX) capsule 0.4 mg  0.4 mg Oral DAILY    atorvastatin (LIPITOR) tablet 20 mg  20 mg Oral QHS                 Labs: Results:   Chemistry Recent Labs     09/09/21 0457 09/08/21 0453   GLU 90 84    139   K 3.6 3.0*    109   CO2 24 21   BUN 5* 8*   CREA 0.50* 0.40*   CA 7.8* 8.0*   AGAP 7 9   BUCR 10 20      CBC w/Diff Recent Labs     09/09/21 0457 09/08/21 0453   WBC 9.9 16.3*   RBC 3.15* 2.99*   HGB 9.6* 9.2*   HCT 30.5* 28.9*    303        All Micro Results     None            Imaging:   No results found.          Teena Betty Hunt MD  September 10, 2021  9:11 AM

## 2021-09-10 NOTE — PROGRESS NOTES
Problem: Mobility Impaired (Adult and Pediatric)  Goal: *Acute Goals and Plan of Care (Insert Text)  Description: Physical Therapy Goals  Initiated 9/9/2021 and to be accomplished within 7 day(s)  1. Patient will move from supine to sit and sit to supine , scoot up and down, and roll side to side in bed with modified independence. 2.  Patient will transfer from bed to chair and chair to bed with modified independence using the least restrictive device. 3.  Patient will perform sit to stand with modified independence. 4.  Patient will ambulate with modified independence for 100 feet with the least restrictive device. 5.  Patient will ascend/descend 4 stairs with 2 handrail(s) with minimal assistance/contact guard assist.    PLOF: Community ambulator who used a cane or RW PRN and who was (I) with ADLs. Outcome: Progressing Towards Goal   PHYSICAL THERAPY TREATMENT    Patient: Dianne Nieto (74 y.o. female)  Date: 9/10/2021  Diagnosis: Colitis [K52.9] Colitis       Precautions: Other (comment) (Monitor SpO2)    ASSESSMENT:  Pt performing functional activities, so walks to the bathroom and performs toileting with assistance to don brief. She ambulates on R. A. with severe dyspnea reported and lightheadedness, but her BP was WNL. She then ambulates for one more bout. After this she pivots to a chair at the bedside and is left with all needs met. Progression toward goals:   []      Improving appropriately and progressing toward goals  [x]      Improving slowly and progressing toward goals  []      Not making progress toward goals and plan of care will be adjusted     PLAN:  Patient continues to benefit from skilled intervention to address the above impairments. Continue treatment per established plan of care.   Discharge Recommendations:  Home Health, Home Physical Therapy, or Skilled Nursing Facility  Further Equipment Recommendations for Discharge:  N/A     SUBJECTIVE:   Patient stated  I'm sorry that I can't do much.     OBJECTIVE DATA SUMMARY:   Critical Behavior:  Neurologic State: Alert  Orientation Level: Oriented X4  Functional Mobility Training:  Bed Mobility:  Rolling: Modified independent  Supine to Sit: Modified independent  Sit to Supine: Modified independent  Scooting: Modified independent  Transfers:  Sit to Stand: Minimum assistance (Pt has more difficulty standing from bed today)  Stand to Sit: Minimum assistance  Balance:  Sitting: Intact  Standing: Intact   Posture:   Posture (WDL): Within defined limits   Ambulation/Gait Training:  Distance (ft): 35 Feet (ft) (and 25'x2, severe dyspnea reported)  Assistive Device: Walker, rolling  Ambulation - Level of Assistance: Contact guard assistance  Gait Description (WDL): Exceptions to WDL  Gait Abnormalities: Other (flexed postre)  Stairs:  Stairs - Level of Assistance: Other (comment) (Pt states she is too tired to attempt today)  Pain:  Pain level pre-treatment: 5/10  Pain level post-treatment: 5/10   Pain Location: Abdomen  Pain Intervention(s): Medication (see MAR); Rest, Ice, Repositioning   Response to intervention: Nurse notified, See doc flow    Activity Tolerance:   Poor  Please refer to the flowsheet for vital signs taken during this treatment. After treatment:   [x] Patient left in no apparent distress sitting up in chair  [] Patient left in no apparent distress in bed  [x] Call bell left within reach  [x] Nursing notified  [] Caregiver present  [] Bed alarm activated  [] SCDs applied      COMMUNICATION/EDUCATION:   [x]         Role of Physical Therapy in the acute care setting. []         Fall prevention education was provided and the patient/caregiver indicated understanding. [x]         Patient/family have participated as able in working toward goals and plan of care. [x]         Patient/family agree to work toward stated goals and plan of care.   []         Patient understands intent and goals of therapy, but is neutral about his/her participation.   []         Patient is unable to participate in stated goals/plan of care: ongoing with therapy staff.  []         Other:        Chema Gonzalez, PT, DPT   Time Calculation: 23 mins ; therapist returned for PM tx but pt refused due to fatigue and abdominal discomfort

## 2021-09-10 NOTE — PROGRESS NOTES
Patient and patient's daughter have decided they would like for pt to go to one of the following:  [facility]  (R) [phone] (C) [phone] in [place] or [facility]- (D) [phone] (D) [phone] in [address] [place] [address]. Will send clinical to both. CM to follow.

## 2021-09-11 ENCOUNTER — APPOINTMENT (OUTPATIENT)
Dept: CT IMAGING | Age: 86
DRG: 392 | End: 2021-09-11
Attending: INTERNAL MEDICINE
Payer: MEDICARE

## 2021-09-11 LAB
ANION GAP SERPL CALC-SCNC: 8 MMOL/L
BASOPHILS # BLD: 0 K/UL (ref 0–0.1)
BASOPHILS NFR BLD: 0 % (ref 0–2)
BUN SERPL-MCNC: <1 MG/DL (ref 9–21)
BUN/CREAT SERPL: ABNORMAL
CA-I BLD-MCNC: 8.3 MG/DL (ref 8.5–10.5)
CHLORIDE SERPL-SCNC: 106 MMOL/L (ref 94–111)
CO2 SERPL-SCNC: 25 MMOL/L (ref 21–33)
CREAT SERPL-MCNC: 0.6 MG/DL (ref 0.7–1.2)
DIFFERENTIAL METHOD BLD: ABNORMAL
EOSINOPHIL # BLD: 0.2 K/UL (ref 0–0.4)
EOSINOPHIL NFR BLD: 2 % (ref 0–5)
ERYTHROCYTE [DISTWIDTH] IN BLOOD BY AUTOMATED COUNT: 14 % (ref 11.6–14.5)
GLUCOSE SERPL-MCNC: 77 MG/DL (ref 70–110)
HCT VFR BLD AUTO: 32.9 % (ref 35–45)
HGB BLD-MCNC: 10.3 G/DL (ref 12–16)
IMM GRANULOCYTES # BLD AUTO: 0.1 K/UL (ref 0–0.04)
IMM GRANULOCYTES NFR BLD AUTO: 1 % (ref 0–0.5)
LYMPHOCYTES # BLD: 1.6 K/UL (ref 0.9–3.6)
LYMPHOCYTES NFR BLD: 18 % (ref 21–52)
MAGNESIUM SERPL-MCNC: 1.7 MG/DL (ref 1.7–2.8)
MCH RBC QN AUTO: 30.6 PG (ref 24–34)
MCHC RBC AUTO-ENTMCNC: 31.3 G/DL (ref 31–37)
MCV RBC AUTO: 97.6 FL (ref 78–100)
MONOCYTES # BLD: 0.9 K/UL (ref 0.05–1.2)
MONOCYTES NFR BLD: 9 % (ref 3–10)
NEUTS SEG # BLD: 6.5 K/UL (ref 1.8–8)
NEUTS SEG NFR BLD: 70 % (ref 40–73)
NRBC # BLD: 0 K/UL (ref 0–0.01)
NRBC BLD-RTO: 0 PER 100 WBC
PHOSPHATE SERPL-MCNC: 1.6 MG/DL (ref 2.5–4.5)
PLATELET # BLD AUTO: 368 K/UL (ref 135–420)
PMV BLD AUTO: 9.3 FL (ref 9.2–11.8)
POTASSIUM SERPL-SCNC: 4.5 MMOL/L (ref 3.2–5.1)
RBC # BLD AUTO: 3.37 M/UL (ref 4.2–5.3)
SODIUM SERPL-SCNC: 139 MMOL/L (ref 135–145)
WBC # BLD AUTO: 9.4 K/UL (ref 4.6–13.2)

## 2021-09-11 PROCEDURE — 83735 ASSAY OF MAGNESIUM: CPT

## 2021-09-11 PROCEDURE — 94761 N-INVAS EAR/PLS OXIMETRY MLT: CPT

## 2021-09-11 PROCEDURE — 65270000029 HC RM PRIVATE

## 2021-09-11 PROCEDURE — 80048 BASIC METABOLIC PNL TOTAL CA: CPT

## 2021-09-11 PROCEDURE — 85025 COMPLETE CBC W/AUTO DIFF WBC: CPT

## 2021-09-11 PROCEDURE — 74011250637 HC RX REV CODE- 250/637: Performed by: NURSE PRACTITIONER

## 2021-09-11 PROCEDURE — 84100 ASSAY OF PHOSPHORUS: CPT

## 2021-09-11 PROCEDURE — 74011250636 HC RX REV CODE- 250/636: Performed by: INTERNAL MEDICINE

## 2021-09-11 PROCEDURE — 74011000636 HC RX REV CODE- 636: Performed by: INTERNAL MEDICINE

## 2021-09-11 PROCEDURE — 74011250637 HC RX REV CODE- 250/637: Performed by: INTERNAL MEDICINE

## 2021-09-11 PROCEDURE — 74177 CT ABD & PELVIS W/CONTRAST: CPT

## 2021-09-11 PROCEDURE — 36415 COLL VENOUS BLD VENIPUNCTURE: CPT

## 2021-09-11 PROCEDURE — 77010033678 HC OXYGEN DAILY

## 2021-09-11 RX ORDER — METRONIDAZOLE 500 MG/100ML
500 INJECTION, SOLUTION INTRAVENOUS EVERY 8 HOURS
Status: DISCONTINUED | OUTPATIENT
Start: 2021-09-11 | End: 2021-09-13

## 2021-09-11 RX ADMIN — METRONIDAZOLE 500 MG: 500 INJECTION, SOLUTION INTRAVENOUS at 22:26

## 2021-09-11 RX ADMIN — ONDANSETRON 4 MG: 4 TABLET, ORALLY DISINTEGRATING ORAL at 22:26

## 2021-09-11 RX ADMIN — ATORVASTATIN CALCIUM 20 MG: 10 TABLET, FILM COATED ORAL at 22:26

## 2021-09-11 RX ADMIN — POTASSIUM CHLORIDE 40 MEQ: 750 TABLET, EXTENDED RELEASE ORAL at 17:42

## 2021-09-11 RX ADMIN — TRAMADOL HYDROCHLORIDE 50 MG: 50 TABLET, FILM COATED ORAL at 09:36

## 2021-09-11 RX ADMIN — TRAMADOL HYDROCHLORIDE 50 MG: 50 TABLET, FILM COATED ORAL at 17:43

## 2021-09-11 RX ADMIN — ONDANSETRON 4 MG: 4 TABLET, ORALLY DISINTEGRATING ORAL at 05:50

## 2021-09-11 RX ADMIN — CARVEDILOL 3.12 MG: 3.12 TABLET, FILM COATED ORAL at 17:43

## 2021-09-11 RX ADMIN — Medication 10 ML: at 22:26

## 2021-09-11 RX ADMIN — THERA TABS 1 TABLET: TAB at 09:36

## 2021-09-11 RX ADMIN — Medication 1 TABLET: at 09:36

## 2021-09-11 RX ADMIN — APIXABAN 2.5 MG: 2.5 TABLET, FILM COATED ORAL at 17:42

## 2021-09-11 RX ADMIN — METRONIDAZOLE 500 MG: 250 TABLET ORAL at 05:50

## 2021-09-11 RX ADMIN — CARVEDILOL 3.12 MG: 3.12 TABLET, FILM COATED ORAL at 09:36

## 2021-09-11 RX ADMIN — FUROSEMIDE 40 MG: 40 TABLET ORAL at 09:36

## 2021-09-11 RX ADMIN — APIXABAN 2.5 MG: 2.5 TABLET, FILM COATED ORAL at 09:36

## 2021-09-11 RX ADMIN — POTASSIUM CHLORIDE 40 MEQ: 750 TABLET, EXTENDED RELEASE ORAL at 09:36

## 2021-09-11 RX ADMIN — IOPAMIDOL 100 ML: 755 INJECTION, SOLUTION INTRAVENOUS at 09:15

## 2021-09-11 RX ADMIN — FAMOTIDINE 10 MG: 20 TABLET, FILM COATED ORAL at 09:36

## 2021-09-11 RX ADMIN — METRONIDAZOLE 500 MG: 500 INJECTION, SOLUTION INTRAVENOUS at 17:43

## 2021-09-11 RX ADMIN — FAMOTIDINE 10 MG: 20 TABLET, FILM COATED ORAL at 17:43

## 2021-09-11 RX ADMIN — TAMSULOSIN HYDROCHLORIDE 0.4 MG: 0.4 CAPSULE ORAL at 09:36

## 2021-09-11 NOTE — PROGRESS NOTES
0342- bedside shift report completed and care of the patient assumed, bed in lowest position and call bell within reach    0800- brief changed due to accidental stool    0943- AM medications administered    1025- up to [facility] to defecate and void    1130-lunch provided, visitor in room    1305- up to [facility] to void. NP Grover in to discuss CT results. 1445- Up to [facility] to void.     1800- up to [facility] to void, scheduled medications administered

## 2021-09-11 NOTE — PROGRESS NOTES
Progress Note    Patient: Skip Posey MRN: [id number]  SSN: [id number]    YOB: 1934  Age: 80 y.o. Sex: female      Admit Date: 9/6/2021    LOS: 5 days     Assessment and Plan:   #1: Colitis with abdominal pain:  -Seen and evaluated by GI, Dr. Ofelia Zambrano recommendations to continue oral antibiotics in the next 2 weeks, upon discharge. .  -Leukocytosis improved.  afebrile.   - still has significant abdominal pain. Repeat ct abd/pelvis  - check cdiff  - surgery consult if not improved tomorrow  - now that she has iv access, will change flagyl to IV    #2: Right-sided lung mass, new finding per CT 9/6/21:  Tatyanayren Gosselin suspicious for primary lung malignancy with mediastinal and hilar LN involvement (ipsilateral)  -Pulmonology has been consulted. See notes.     #3: Hypokalemia:   -Resolved.     #4: Paroxysmal atrial fibrillation:  -Chronic issue, stable.   -Hx pacemaker, AV paced on tele. -continue carvedilol and Eliquis.     #5: Hypertension:  -Chronic issue, normotensive. -BP on the low end of normal, hold amlodipine and lisinopril.     #6: COPD/emphysema:  -Chronic issue, has home O2 at 2 L/min.  -Stable, not in acute exacerbation.       #7: Hyperlipidemia:  -Chronic issue, on statin.      Disposition: The patient has become too debilitated and really needs rehab. At this point the patient is not a safe discharge. I am going to repeat a CT of her abdomen and pelvis. I am going to encourage nursing staff to get her up as much as possible. Case management appears to be following for rehab discharge            Subjective: The patient continues to have diffuse abdominal pain slightly worse today. She has no fevers or chills and she is tolerating a diet. She denies any new shortness of breath or cough. She has no chest pain. She reports she is really unable to even get to the commode. She otherwise feels okay.         Current Facility-Administered Medications   Medication Dose Route Frequency  levoFLOXacin (LEVAQUIN) tablet 750 mg  750 mg Oral Q48H    metroNIDAZOLE (FLAGYL) tablet 500 mg  500 mg Oral Q8H    potassium chloride (KLOR-CON) tablet 40 mEq  40 mEq Oral BID    famotidine (PEPCID) tablet 10 mg  10 mg Oral BID    sodium chloride (NS) flush 5-40 mL  5-40 mL IntraVENous Q8H    sodium chloride (NS) flush 5-40 mL  5-40 mL IntraVENous PRN    acetaminophen (TYLENOL) tablet 650 mg  650 mg Oral Q6H PRN    Or    acetaminophen (TYLENOL) suppository 650 mg  650 mg Rectal Q6H PRN    polyethylene glycol (MIRALAX) packet 17 g  17 g Oral DAILY PRN    ondansetron (ZOFRAN ODT) tablet 4 mg  4 mg Oral Q8H PRN    Or    ondansetron (ZOFRAN) injection 4 mg  4 mg IntraVENous Q6H PRN    melatonin tablet 3 mg  3 mg Oral QHS PRN    traMADoL (ULTRAM) tablet 50 mg  50 mg Oral Q6H PRN    albuterol (PROVENTIL HFA, VENTOLIN HFA, PROAIR HFA) inhaler 2 Puff  2 Puff Inhalation Q6H PRN    [Held by provider] amLODIPine (NORVASC) tablet 5 mg  5 mg Oral DAILY    apixaban (ELIQUIS) tablet 2.5 mg  2.5 mg Oral BID    calcium-vitamin D (OS-MOOK +D3) 250 mg-125 unit per tablet 1 Tablet  1 Tablet Oral DAILY    carvediloL (COREG) tablet 3.125 mg  3.125 mg Oral BID WITH MEALS    fluticasone propionate (FLONASE) 50 mcg/actuation nasal spray 1 Spray  1 Spray Both Nostrils DAILY    furosemide (LASIX) tablet 40 mg  40 mg Oral DAILY    [Held by provider] lisinopriL (PRINIVIL, ZESTRIL) tablet 20 mg  20 mg Oral DAILY    therapeutic multivitamin (THERAGRAN) tablet 1 Tablet  1 Tablet Oral DAILY    tamsulosin (FLOMAX) capsule 0.4 mg  0.4 mg Oral DAILY    atorvastatin (LIPITOR) tablet 20 mg  20 mg Oral QHS        Vitals:    09/10/21 1202 09/10/21 1937 09/10/21 2053 09/11/21 0042   BP: 104/60  118/82 122/80   Pulse: 84  80 72   Resp: 14  16 16   Temp: 97.6 °F (36.4 °C)  98 °F (36.7 °C) 96.9 °F (36.1 °C)   SpO2: 95% 96% 97% 98%   Weight:       Height:         Objective:   General: alert awake well-oriented, no acute distress. HEENT: EOMI, no Icterus, no Pallor,  mucosa moist.  Neck: Neck is supple, No JVD  Lungs: breathsounds normal, scant wheezes noted  CVS: heart sounds normal, regular rate and rhythm, no murmurs, no rubs. GI: slightly distended, ttp, ? Rebound right and left lower quads  Extremeties: no cyanosis, no edema,   Neuro: Alert, awake, oriented x3, No new focal deficits, moving all extremeties well. Skin: normal skin turgor, no skin rashes. Intake and Output:  Current Shift: No intake/output data recorded. Last three shifts: 09/09 1901 - 09/11 0700  In: 1000 [P.O.:900; I.V.:100]  Out: -       Lab/Data Review:  Recent Results (from the past 24 hour(s))   CBC WITH AUTOMATED DIFF    Collection Time: 09/11/21  5:00 AM   Result Value Ref Range    WBC 9.4 4.6 - 13.2 K/uL    RBC 3.37 (L) 4.20 - 5.30 M/uL    HGB 10.3 (L) 12.0 - 16.0 g/dL    HCT 32.9 (L) 35.0 - 45.0 %    MCV 97.6 78.0 - 100.0 FL    MCH 30.6 24.0 - 34.0 PG    MCHC 31.3 31.0 - 37.0 g/dL    RDW 14.0 11.6 - 14.5 %    PLATELET 663 241 - 207 K/uL    MPV 9.3 9.2 - 11.8 FL    NRBC 0.0 0.0  WBC    ABSOLUTE NRBC 0.00 0.00 - 0.01 K/uL    NEUTROPHILS 70 40 - 73 %    LYMPHOCYTES 18 (L) 21 - 52 %    MONOCYTES 9 3 - 10 %    EOSINOPHILS 2 0 - 5 %    BASOPHILS 0 0 - 2 %    IMMATURE GRANULOCYTES 1 (H) 0 - 0.5 %    ABS. NEUTROPHILS 6.5 1.8 - 8.0 K/UL    ABS. LYMPHOCYTES 1.6 0.9 - 3.6 K/UL    ABS. MONOCYTES 0.9 0.05 - 1.2 K/UL    ABS. EOSINOPHILS 0.2 0.0 - 0.4 K/UL    ABS. BASOPHILS 0.0 0.0 - 0.1 K/UL    ABS. IMM.  GRANS. 0.1 (H) 0.00 - 0.04 K/UL    DF AUTOMATED     MAGNESIUM    Collection Time: 09/11/21  5:00 AM   Result Value Ref Range    Magnesium 1.7 1.7 - 2.8 mg/dL   METABOLIC PANEL, BASIC    Collection Time: 09/11/21  5:00 AM   Result Value Ref Range    Sodium 139 135 - 145 mmol/L    Potassium 4.5 3.2 - 5.1 mmol/L    Chloride 106 94 - 111 mmol/L    CO2 25 21 - 33 mmol/L    Anion gap 8 mmol/L    Glucose 77 70 - 110 mg/dL    BUN <1 (L) 9 - 21 mg/dL Creatinine 0.60 (L) 0.70 - 1.20 mg/dL    BUN/Creatinine ratio Not calculated      GFR est AA >60 ml/min/1.73m2    GFR est non-AA >60 ml/min/1.73m2    Calcium 8.3 (L) 8.5 - 10.5 mg/dL   PHOSPHORUS    Collection Time: 09/11/21  5:00 AM   Result Value Ref Range    Phosphorus 1.6 (L) 2.5 - 4.5 mg/dL          Signed By: Denise Pedro MD     September 11, 2021

## 2021-09-12 LAB
ALBUMIN SERPL-MCNC: 2.9 G/DL (ref 3.5–4.7)
ALBUMIN/GLOB SERPL: 0.9 {RATIO}
ALP SERPL-CCNC: 57 U/L (ref 38–126)
ALT SERPL-CCNC: 14 U/L (ref 3–52)
ANION GAP SERPL CALC-SCNC: 8 MMOL/L
AST SERPL W P-5'-P-CCNC: 24 U/L (ref 14–74)
BASOPHILS # BLD: 0 K/UL (ref 0–0.1)
BASOPHILS NFR BLD: 0 % (ref 0–2)
BILIRUB SERPL-MCNC: 0.6 MG/DL (ref 0.2–1)
BUN SERPL-MCNC: 6 MG/DL (ref 9–21)
BUN/CREAT SERPL: 9
CA-I BLD-MCNC: 9.1 MG/DL (ref 8.5–10.5)
CHLORIDE SERPL-SCNC: 105 MMOL/L (ref 94–111)
CO2 SERPL-SCNC: 26 MMOL/L (ref 21–33)
CREAT SERPL-MCNC: 0.7 MG/DL (ref 0.7–1.2)
DIFFERENTIAL METHOD BLD: ABNORMAL
EOSINOPHIL # BLD: 0.3 K/UL (ref 0–0.4)
EOSINOPHIL NFR BLD: 3 % (ref 0–5)
ERYTHROCYTE [DISTWIDTH] IN BLOOD BY AUTOMATED COUNT: 14.3 % (ref 11.6–14.5)
GLOBULIN SER CALC-MCNC: 3.2 G/DL
GLUCOSE SERPL-MCNC: 94 MG/DL (ref 70–110)
HCT VFR BLD AUTO: 34.7 % (ref 35–45)
HGB BLD-MCNC: 10.6 G/DL (ref 12–16)
IMM GRANULOCYTES # BLD AUTO: 0.1 K/UL (ref 0–0.04)
IMM GRANULOCYTES NFR BLD AUTO: 1 % (ref 0–0.5)
LYMPHOCYTES # BLD: 2.5 K/UL (ref 0.9–3.6)
LYMPHOCYTES NFR BLD: 24 % (ref 21–52)
MCH RBC QN AUTO: 30.1 PG (ref 24–34)
MCHC RBC AUTO-ENTMCNC: 30.5 G/DL (ref 31–37)
MCV RBC AUTO: 98.6 FL (ref 78–100)
MONOCYTES # BLD: 1 K/UL (ref 0.05–1.2)
MONOCYTES NFR BLD: 10 % (ref 3–10)
NEUTS SEG # BLD: 6.4 K/UL (ref 1.8–8)
NEUTS SEG NFR BLD: 62 % (ref 40–73)
NRBC # BLD: 0 K/UL (ref 0–0.01)
NRBC BLD-RTO: 0 PER 100 WBC
PLATELET # BLD AUTO: 389 K/UL (ref 135–420)
PMV BLD AUTO: 9.3 FL (ref 9.2–11.8)
POTASSIUM SERPL-SCNC: 5.3 MMOL/L (ref 3.2–5.1)
PROT SERPL-MCNC: 6.1 G/DL (ref 6.1–8.4)
RBC # BLD AUTO: 3.52 M/UL (ref 4.2–5.3)
SODIUM SERPL-SCNC: 139 MMOL/L (ref 135–145)
WBC # BLD AUTO: 10.4 K/UL (ref 4.6–13.2)

## 2021-09-12 PROCEDURE — 74011250636 HC RX REV CODE- 250/636: Performed by: NURSE PRACTITIONER

## 2021-09-12 PROCEDURE — 36415 COLL VENOUS BLD VENIPUNCTURE: CPT

## 2021-09-12 PROCEDURE — 80053 COMPREHEN METABOLIC PANEL: CPT

## 2021-09-12 PROCEDURE — 85025 COMPLETE CBC W/AUTO DIFF WBC: CPT

## 2021-09-12 PROCEDURE — 77010033678 HC OXYGEN DAILY

## 2021-09-12 PROCEDURE — 74011250637 HC RX REV CODE- 250/637: Performed by: NURSE PRACTITIONER

## 2021-09-12 PROCEDURE — 74011250636 HC RX REV CODE- 250/636: Performed by: INTERNAL MEDICINE

## 2021-09-12 PROCEDURE — 74011250637 HC RX REV CODE- 250/637: Performed by: INTERNAL MEDICINE

## 2021-09-12 PROCEDURE — 65270000029 HC RM PRIVATE

## 2021-09-12 PROCEDURE — 94761 N-INVAS EAR/PLS OXIMETRY MLT: CPT

## 2021-09-12 RX ADMIN — FUROSEMIDE 40 MG: 40 TABLET ORAL at 08:15

## 2021-09-12 RX ADMIN — FAMOTIDINE 10 MG: 20 TABLET, FILM COATED ORAL at 17:03

## 2021-09-12 RX ADMIN — CARVEDILOL 3.12 MG: 3.12 TABLET, FILM COATED ORAL at 08:16

## 2021-09-12 RX ADMIN — FAMOTIDINE 10 MG: 20 TABLET, FILM COATED ORAL at 08:16

## 2021-09-12 RX ADMIN — ONDANSETRON 4 MG: 4 TABLET, ORALLY DISINTEGRATING ORAL at 21:01

## 2021-09-12 RX ADMIN — SODIUM CHLORIDE 500 ML: 9 INJECTION, SOLUTION INTRAVENOUS at 12:02

## 2021-09-12 RX ADMIN — APIXABAN 2.5 MG: 2.5 TABLET, FILM COATED ORAL at 08:16

## 2021-09-12 RX ADMIN — TAMSULOSIN HYDROCHLORIDE 0.4 MG: 0.4 CAPSULE ORAL at 08:16

## 2021-09-12 RX ADMIN — Medication 10 ML: at 21:09

## 2021-09-12 RX ADMIN — Medication 1 TABLET: at 08:16

## 2021-09-12 RX ADMIN — THERA TABS 1 TABLET: TAB at 08:16

## 2021-09-12 RX ADMIN — Medication 10 ML: at 06:42

## 2021-09-12 RX ADMIN — METRONIDAZOLE 500 MG: 500 INJECTION, SOLUTION INTRAVENOUS at 22:18

## 2021-09-12 RX ADMIN — METRONIDAZOLE 500 MG: 500 INJECTION, SOLUTION INTRAVENOUS at 16:42

## 2021-09-12 RX ADMIN — METRONIDAZOLE 500 MG: 500 INJECTION, SOLUTION INTRAVENOUS at 08:16

## 2021-09-12 RX ADMIN — APIXABAN 2.5 MG: 2.5 TABLET, FILM COATED ORAL at 17:03

## 2021-09-12 RX ADMIN — LEVOFLOXACIN 750 MG: 500 TABLET, FILM COATED ORAL at 17:03

## 2021-09-12 RX ADMIN — ATORVASTATIN CALCIUM 20 MG: 10 TABLET, FILM COATED ORAL at 21:01

## 2021-09-12 NOTE — PROGRESS NOTES
4076- Bedside shift report received, care of the patient assumed, bed in lowest position and call bell within reach    0715- Rounding with Dr. Ronda Nuñez, [address] discussed with patient    0820- set up for breakfast, AM medications administered    0900- up to [facility] to void then up to chair    1125- low BP, patient request to go back to bed, states she is really dizzy, back to bed x2 assist, NP cecille notified of low blood pressure, 500ml bolus NS started    1345- up to [facility] and chair    1500- up to [facility], back to bed    1646- scheduled medications administered.

## 2021-09-12 NOTE — PROGRESS NOTES
1900 - Bedside shift report received, care of the patient assumed, bed in lowest position and call bell within reach    1920 - patient up with assist to bedside commode. Clear yellow urine voided     2108 - PRN zofran administered with scheduled medication. Patient up with assist to bedside commode. Clear yellow urine voided. Patient back in bed and resting comfortably. CBWR.

## 2021-09-12 NOTE — PROGRESS NOTES
Assumed care of pt.     2000- assessed pt, no voiced concerns at this time    2230- scheduled medications given per [facility]. Pt up to bedside commode. 0145- pt up to bedside commode, no voiced concerns at this time    0545- pt up to bedside commode, no voiced concerns at this time.

## 2021-09-13 LAB
ALBUMIN SERPL-MCNC: 2.6 G/DL (ref 3.5–4.7)
ALBUMIN/GLOB SERPL: 0.9 {RATIO}
ALP SERPL-CCNC: 48 U/L (ref 38–126)
ALT SERPL-CCNC: 12 U/L (ref 3–52)
ANION GAP SERPL CALC-SCNC: 8 MMOL/L
AST SERPL W P-5'-P-CCNC: 19 U/L (ref 14–74)
BASOPHILS # BLD: 0 K/UL (ref 0–0.1)
BASOPHILS NFR BLD: 0 % (ref 0–2)
BILIRUB SERPL-MCNC: 0.6 MG/DL (ref 0.2–1)
BUN SERPL-MCNC: 8 MG/DL (ref 9–21)
BUN/CREAT SERPL: 11
CA-I BLD-MCNC: 8.7 MG/DL (ref 8.5–10.5)
CHLORIDE SERPL-SCNC: 103 MMOL/L (ref 94–111)
CO2 SERPL-SCNC: 26 MMOL/L (ref 21–33)
COVID-19 RAPID TEST, COVR: NOT DETECTED
CREAT SERPL-MCNC: 0.7 MG/DL (ref 0.7–1.2)
DIFFERENTIAL METHOD BLD: ABNORMAL
EOSINOPHIL # BLD: 0.2 K/UL (ref 0–0.4)
EOSINOPHIL NFR BLD: 2 % (ref 0–5)
ERYTHROCYTE [DISTWIDTH] IN BLOOD BY AUTOMATED COUNT: 14.3 % (ref 11.6–14.5)
GLOBULIN SER CALC-MCNC: 2.9 G/DL
GLUCOSE SERPL-MCNC: 85 MG/DL (ref 70–110)
HCT VFR BLD AUTO: 34.1 % (ref 35–45)
HGB BLD-MCNC: 10.8 G/DL (ref 12–16)
IMM GRANULOCYTES # BLD AUTO: 0.1 K/UL (ref 0–0.04)
IMM GRANULOCYTES NFR BLD AUTO: 1 % (ref 0–0.5)
LYMPHOCYTES # BLD: 2.2 K/UL (ref 0.9–3.6)
LYMPHOCYTES NFR BLD: 19 % (ref 21–52)
MCH RBC QN AUTO: 30.9 PG (ref 24–34)
MCHC RBC AUTO-ENTMCNC: 31.7 G/DL (ref 31–37)
MCV RBC AUTO: 97.7 FL (ref 78–100)
MONOCYTES # BLD: 1.2 K/UL (ref 0.05–1.2)
MONOCYTES NFR BLD: 10 % (ref 3–10)
NEUTS SEG # BLD: 7.9 K/UL (ref 1.8–8)
NEUTS SEG NFR BLD: 68 % (ref 40–73)
NRBC # BLD: 0 K/UL (ref 0–0.01)
NRBC BLD-RTO: 0 PER 100 WBC
PLATELET # BLD AUTO: 379 K/UL (ref 135–420)
PMV BLD AUTO: 9.5 FL (ref 9.2–11.8)
POTASSIUM SERPL-SCNC: 3.9 MMOL/L (ref 3.2–5.1)
PROT SERPL-MCNC: 5.5 G/DL (ref 6.1–8.4)
RBC # BLD AUTO: 3.49 M/UL (ref 4.2–5.3)
SODIUM SERPL-SCNC: 137 MMOL/L (ref 135–145)
SPECIMEN SOURCE: NORMAL
WBC # BLD AUTO: 11.6 K/UL (ref 4.6–13.2)

## 2021-09-13 PROCEDURE — 74011250637 HC RX REV CODE- 250/637: Performed by: NURSE PRACTITIONER

## 2021-09-13 PROCEDURE — 94761 N-INVAS EAR/PLS OXIMETRY MLT: CPT

## 2021-09-13 PROCEDURE — 77010033678 HC OXYGEN DAILY

## 2021-09-13 PROCEDURE — 85025 COMPLETE CBC W/AUTO DIFF WBC: CPT

## 2021-09-13 PROCEDURE — 97110 THERAPEUTIC EXERCISES: CPT

## 2021-09-13 PROCEDURE — 74011250637 HC RX REV CODE- 250/637: Performed by: INTERNAL MEDICINE

## 2021-09-13 PROCEDURE — 87635 SARS-COV-2 COVID-19 AMP PRB: CPT

## 2021-09-13 PROCEDURE — 65270000029 HC RM PRIVATE

## 2021-09-13 PROCEDURE — 74011250636 HC RX REV CODE- 250/636: Performed by: INTERNAL MEDICINE

## 2021-09-13 PROCEDURE — 97116 GAIT TRAINING THERAPY: CPT

## 2021-09-13 PROCEDURE — 80053 COMPREHEN METABOLIC PANEL: CPT

## 2021-09-13 PROCEDURE — 36415 COLL VENOUS BLD VENIPUNCTURE: CPT

## 2021-09-13 RX ORDER — METRONIDAZOLE 500 MG/1
500 TABLET ORAL 3 TIMES DAILY
Qty: 21 TABLET | Refills: 0 | Status: SHIPPED
Start: 2021-09-13 | End: 2021-09-20

## 2021-09-13 RX ORDER — LEVOFLOXACIN 750 MG/1
750 TABLET ORAL DAILY
Qty: 7 TABLET | Refills: 0 | Status: SHIPPED
Start: 2021-09-13 | End: 2021-09-20

## 2021-09-13 RX ORDER — METRONIDAZOLE 250 MG/1
500 TABLET ORAL 3 TIMES DAILY
Status: DISCONTINUED | OUTPATIENT
Start: 2021-09-13 | End: 2021-09-14 | Stop reason: HOSPADM

## 2021-09-13 RX ADMIN — Medication 3 MG: at 21:59

## 2021-09-13 RX ADMIN — ACETAMINOPHEN 650 MG: 325 TABLET ORAL at 21:58

## 2021-09-13 RX ADMIN — METRONIDAZOLE 500 MG: 250 TABLET ORAL at 21:59

## 2021-09-13 RX ADMIN — CARVEDILOL 3.12 MG: 3.12 TABLET, FILM COATED ORAL at 17:09

## 2021-09-13 RX ADMIN — ATORVASTATIN CALCIUM 20 MG: 10 TABLET, FILM COATED ORAL at 21:59

## 2021-09-13 RX ADMIN — APIXABAN 2.5 MG: 2.5 TABLET, FILM COATED ORAL at 09:41

## 2021-09-13 RX ADMIN — FUROSEMIDE 40 MG: 40 TABLET ORAL at 09:41

## 2021-09-13 RX ADMIN — Medication 10 ML: at 14:34

## 2021-09-13 RX ADMIN — Medication 10 ML: at 05:01

## 2021-09-13 RX ADMIN — Medication 1 TABLET: at 09:40

## 2021-09-13 RX ADMIN — FAMOTIDINE 10 MG: 20 TABLET, FILM COATED ORAL at 09:40

## 2021-09-13 RX ADMIN — FLUTICASONE PROPIONATE 1 SPRAY: 50 SPRAY, METERED NASAL at 10:13

## 2021-09-13 RX ADMIN — APIXABAN 2.5 MG: 2.5 TABLET, FILM COATED ORAL at 17:09

## 2021-09-13 RX ADMIN — FAMOTIDINE 10 MG: 20 TABLET, FILM COATED ORAL at 17:09

## 2021-09-13 RX ADMIN — Medication 10 ML: at 14:33

## 2021-09-13 RX ADMIN — THERA TABS 1 TABLET: TAB at 09:40

## 2021-09-13 RX ADMIN — METRONIDAZOLE 500 MG: 500 INJECTION, SOLUTION INTRAVENOUS at 06:30

## 2021-09-13 RX ADMIN — ACETAMINOPHEN 650 MG: 325 TABLET ORAL at 04:56

## 2021-09-13 RX ADMIN — CARVEDILOL 3.12 MG: 3.12 TABLET, FILM COATED ORAL at 10:13

## 2021-09-13 RX ADMIN — METRONIDAZOLE 500 MG: 250 TABLET ORAL at 17:13

## 2021-09-13 RX ADMIN — TAMSULOSIN HYDROCHLORIDE 0.4 MG: 0.4 CAPSULE ORAL at 09:40

## 2021-09-13 NOTE — PROGRESS NOTES
Comprehensive Nutrition Assessment    Type and Reason for Visit: reasssessment    Nutrition Recommendations/Plan: advanced to full liquids provide ensure protein max TID  Continue to advance as tolerated to a GI soft/ cardiac diet    Nutrition Assessment:  79 yo female PMH: COPD, Af-ib, HTN, HLD, CHF, CAD, recent diverticulitis   recent diverticulitis has persistant LLQ pain x 2 weeks with fever, chills, weakness, lightheadedness, nausea mucoid stools. Being treated with IVF IV ABx and pumonology consult for incidental finding of right lower lobe lung mass. Currently on clear liquids diet no FR currently does have hx of CHF with chronic SOB. Albumin 2.9, K+ 3.1 low likely due to poor intake. Pt received 20 mEq oral K+ and will start ensure clear TID for low albumin can consider changing supplement once diet is advanced. 9/10/2021 K+3.6 advanced to full liquids diet upgrade supplement to ensure protein max TID. Pt with lung mass needs biopsy once stable suspicious of lung malignancy. Pt weak PT consult pt discharge tomorrow to go to [facility]. No constipation having consistent BM    9/13/2021 hyperkalemia resolved. Pt still on full liquids diet x 3 days (6 total days on liquids was on clears starting 9/10) still having some abdominal pain as reason for no further diet advancement. Still receiving ensure protein max TID to get protein calories. Pt is being discharged tomorrow to [facility] due to debility and will continue 7 days abx levaquin and PO flagyl.      Recent Results (from the past 24 hour(s))   METABOLIC PANEL, COMPREHENSIVE    Collection Time: 09/13/21  3:48 AM   Result Value Ref Range    Sodium 137 135 - 145 mmol/L    Potassium 3.9 3.2 - 5.1 mmol/L    Chloride 103 94 - 111 mmol/L    CO2 26 21 - 33 mmol/L    Anion gap 8 mmol/L    Glucose 85 70 - 110 mg/dL    BUN 8 (L) 9 - 21 mg/dL    Creatinine 0.70 0.70 - 1.20 mg/dL    BUN/Creatinine ratio 11      GFR est AA >60 ml/min/1.73m2 GFR est non-AA >60 ml/min/1.73m2    Calcium 8.7 8.5 - 10.5 mg/dL    Bilirubin, total 0.6 0.2 - 1.0 mg/dL    AST (SGOT) 19 14 - 74 U/L    ALT (SGPT) 12 3 - 52 U/L    Alk. phosphatase 48 38 - 126 U/L    Protein, total 5.5 (L) 6.1 - 8.4 g/dL    Albumin 2.6 (L) 3.5 - 4.7 g/dL    Globulin 2.9 g/dL    A-G Ratio 0.9     CBC WITH AUTOMATED DIFF    Collection Time: 09/13/21  3:48 AM   Result Value Ref Range    WBC 11.6 4.6 - 13.2 K/uL    RBC 3.49 (L) 4.20 - 5.30 M/uL    HGB 10.8 (L) 12.0 - 16.0 g/dL    HCT 34.1 (L) 35.0 - 45.0 %    MCV 97.7 78.0 - 100.0 FL    MCH 30.9 24.0 - 34.0 PG    MCHC 31.7 31.0 - 37.0 g/dL    RDW 14.3 11.6 - 14.5 %    PLATELET 887 162 - 407 K/uL    MPV 9.5 9.2 - 11.8 FL    NRBC 0.0 0.0  WBC    ABSOLUTE NRBC 0.00 0.00 - 0.01 K/uL    NEUTROPHILS 68 40 - 73 %    LYMPHOCYTES 19 (L) 21 - 52 %    MONOCYTES 10 3 - 10 %    EOSINOPHILS 2 0 - 5 %    BASOPHILS 0 0 - 2 %    IMMATURE GRANULOCYTES 1 (H) 0 - 0.5 %    ABS. NEUTROPHILS 7.9 1.8 - 8.0 K/UL    ABS. LYMPHOCYTES 2.2 0.9 - 3.6 K/UL    ABS. MONOCYTES 1.2 0.05 - 1.2 K/UL    ABS. EOSINOPHILS 0.2 0.0 - 0.4 K/UL    ABS. BASOPHILS 0.0 0.0 - 0.1 K/UL    ABS. IMM. GRANS. 0.1 (H) 0.00 - 0.04 K/UL    DF AUTOMATED     COVID-19 RAPID TEST    Collection Time: 09/13/21  1:00 PM   Result Value Ref Range    Specimen source Nasopharyngeal      COVID-19 rapid test Not Detected Not Detected         Malnutrition Assessment:  Malnutrition Status:   Moderate malnutrition (LLQ pain x 2 weeks related to diverticulitis)    Context:  Acute illness     Findings of the 6 clinical characteristics of malnutrition:   Energy Intake:  7 - 50% or less of est energy requirements for 5 or more days  Weight Loss:  Unable to assess     Body Fat Loss:  No significant body fat loss,     Muscle Mass Loss:  No significant muscle mass loss,    Fluid Accumulation:  No significant fluid accumulation,     Strength:  Not performed         Estimated Daily Nutrient Needs:  Energy (kcal): 4575-4328 kcal/day; Weight Used for Energy Requirements: Admission (59 kg)  Protein (g): 59-71 g/day; Weight Used for Protein Requirements: Admission (1-1.2 g/kg)  Fluid (ml/day): 5735-9850 mL/day; Method Used for Fluid Requirements: 1 ml/kcal      Nutrition Related Findings:  recent diverticulitis has persistant LLQ pain x 2 weeks with fever, chills, weakness, lightheadedness, nausea mucoid stools. Being treated with IVF IV ABx and pumonology consult for incidental finding of right lower lobe lung mass. Wounds:    None       Current Nutrition Therapies:  ADULT DIET Full Liquid  ADULT ORAL NUTRITION SUPPLEMENT Breakfast, Lunch, Dinner; Low Calorie/High Protein    Anthropometric Measures:  · Height:  5' 3\" (160 cm)  · Current Body Wt:  58.5 kg (129 lb)   · Admission Body Wt:  129 lb    · Usual Body Wt:        · Ideal Body Wt:  115 lbs:  112.2 %   · Adjusted Body Weight:   ; Weight Adjustment for: No adjustment   · Adjusted BMI:       · BMI Category:  Normal weight (BMI 18.5-24. 9)       Nutrition Diagnosis:   · Inadequate oral intake, Altered GI function related to altered GI function, other (specify) (reduced appetite) as evidenced by nausea, poor intake prior to admission, lab values      Nutrition Interventions:   Food and/or Nutrient Delivery: Continue current diet, Start oral nutrition supplement  Nutrition Education and Counseling: Education not appropriate  Coordination of Nutrition Care: Continue to monitor while inpatient    Goals:  Pt to eat > 75% of meals will be able tolerate diet advancement with in 3 days, BM q 1-3 days, BMI 18.5-24.9, K+ 3.5-5.1, albumin > 3.5       Nutrition Monitoring and Evaluation:   Behavioral-Environmental Outcomes:    Food/Nutrient Intake Outcomes: Food and nutrient intake, Supplement intake, Diet advancement/tolerance  Physical Signs/Symptoms Outcomes: Biochemical data, Meal time behavior, Weight, GI status, Nausea/vomiting, Diarrhea     F/U:9/16/2021    Discharge Planning: Too soon to determine     Electronically signed by Nii Riley on 9/13/2021 at 3:59 PM    Contact: SUZAN [phone]

## 2021-09-13 NOTE — PROGRESS NOTES
Patient assisted to bedside commode to void clear yellow urine. Patient back in bed with antibiotics infusing.

## 2021-09-13 NOTE — PROGRESS NOTES
Problem: Mobility Impaired (Adult and Pediatric)  Goal: *Acute Goals and Plan of Care (Insert Text)  Description: Physical Therapy Goals  Initiated 9/9/2021 and to be accomplished within 7 day(s)  1. Patient will move from supine to sit and sit to supine , scoot up and down, and roll side to side in bed with modified independence. 2.  Patient will transfer from bed to chair and chair to bed with modified independence using the least restrictive device. 3.  Patient will perform sit to stand with modified independence. 4.  Patient will ambulate with modified independence for 100 feet with the least restrictive device. 5.  Patient will ascend/descend 4 stairs with 2 handrail(s) with minimal assistance/contact guard assist.    PLOF: Community ambulator who used a cane or RW PRN and who was (I) with ADLs. Outcome: Progressing Towards Goal   PHYSICAL THERAPY TREATMENT    Patient: Yolis Hdez (57 y.o. female)  Date: 9/13/2021  Diagnosis: Colitis [K52.9] Colitis       Precautions: Other (comment) (Monitor SpO2)    ASSESSMENT:  Pt is eager to participate with PT. Pt comes to sitting then stands to ambulate into [place]. Pt demonstrated improved gait with orthotic shoes. Noted mild dyspnea and SpO2 WNL during session. After this Pt performed seated exercise. See below for treatment details. Progression toward goals: Pt ambulated longer distance. []      Improving appropriately and progressing toward goals  [x]      Improving slowly and progressing toward goals  []      Not making progress toward goals and plan of care will be adjusted     PLAN:  Patient continues to benefit from skilled intervention to address the above impairments. Continue treatment per established plan of care.   Discharge Recommendations:  Home Health, Home Physical Therapy, or Skilled Nursing Facility  Further Equipment Recommendations for Discharge:  N/A     SUBJECTIVE:   Patient stated \"I feel better today, but I still feel weak\"    OBJECTIVE DATA SUMMARY:   Critical Behavior:  Neurologic State: Alert  Orientation Level: Oriented X4  Functional Mobility Training:  Bed Mobility:  Rolling: Modified independent  Supine to Sit: Modified independent  Sit to Supine: Modified independent  Scooting: Modified independent  Transfers:  Sit to Stand: Contact guard assistance  Stand to Sit: Contact guard assistance  Balance:  Sitting: Intact  Standing: Intact      Ambulation/Gait Training:  Distance (ft): 50 Feet (ft) (on 2L SpO2 94%)  Assistive Device: Walker, rolling  Ambulation - Level of Assistance: Contact guard assistance     Gait Abnormalities: Other (flexed postre) cues to stand erect and for pursed lip breathing. Stairs:  Stairs - Level of Assistance: Other (comment) (Pt states she is too tired to attempt today)    HEP handout given and reviewed. EXERCISE   Sets   Reps   Active Active Assist   Passive Self ROM   Comments   Ankle Pumps 1 30   [] [] []    Seated march 1 10  [x] [] [] []    LAQ 1 10 [x] [] [] []    Heel Slides 1 10 [x] [] [] []    Hip ABD 1 10  [x]           Pain:  Pain level pre-treatment: 0/10  Pain level post-treatment: 0/10   Pain Location: N/A  Pain Intervention(s): Medication (see MAR); Rest, Ice, Repositioning   Response to intervention: Nurse notified, See doc flow    Activity Tolerance:   Poor  Please refer to the flowsheet for vital signs taken during this treatment. After treatment:   [x] Patient left in no apparent distress sitting up in chair  [] Patient left in no apparent distress in bed  [x] Call bell left within reach  [x] Nursing notified  [] Caregiver present  [] Bed alarm activated  [] SCDs applied      COMMUNICATION/EDUCATION:   [x]         Role of Physical Therapy in the acute care setting. []         Fall prevention education was provided and the patient/caregiver indicated understanding. [x]         Patient/family have participated as able in working toward goals and plan of care.   [x] Patient/family agree to work toward stated goals and plan of care. []         Patient understands intent and goals of therapy, but is neutral about his/her participation.   []         Patient is unable to participate in stated goals/plan of care: ongoing with therapy staff.  []         Other:        ARIELLA Rm   Time Calculation: 33 mins

## 2021-09-13 NOTE — PROGRESS NOTES
Patient will be discharging tomorrow 9/14 to [facility] in [place], [place]. Nurse Report can be called to [phone]. Patient's daughter Roberta Goldstein [phone] will be here tomorrow morning at 11 AM to pick her mother up to take her to the [facility] in [place], [place]. MD made aware.

## 2021-09-13 NOTE — PROGRESS NOTES
Left forearm PIV infiltrated and leaking. 24 gauge placed in patient's right thumb. Patient assisted to bedside commode and back into bed. Antibiotics infusing. CBWR.

## 2021-09-13 NOTE — DISCHARGE SUMMARY
HOSPITALIST DISCHARGE NOTE  Scott Bailey MD, [address]  [address]       PATIENT ID: Yolis Hdez  MRN: [id number]   YOB: 1934    DATE OF ADMISSION: 9/6/2021 11:59 AM    DATE OF DISCHARGE: 09/13/21    PRIMARY CARE PROVIDER: Aura Harvey MD     ATTENDING PHYSICIAN: Scott Bailey MD  DISCHARGING PROVIDER: Scott Bailey MD        CONSULTATIONS: IP CONSULT TO PULMONOLOGY  IP CONSULT TO GASTROENTEROLOGY    PROCEDURES/SURGERIES: * No surgery found *    ADMITTING [address] [place] [address] COURSE:     Yolis Hdez is a 80 y.o. elderly  female with a past medical history for COPD nocturnal oxygen dependent, paroxsymal atrial fib, hypertension, hyperlipidemia, diastolic congestive heart failure, coronary artery disease, AICD, and recently diagnosed with diverticulitis. Patient presents to the ED with a chief complaint of abdominal pain. Patient states that abdominal pain has been ongoing since her last admission at [facility]. She complains of generalized abdominal pain, but mostly to LLQ that has been ongoing for the last 2 weeks, she reports intermittent sharp pain, but a continuous dull ache, nothing has relieved the pain, but palpation aggravates the pain, she is rating the pain 8/10. Patient was admitted on 8/28/2021 at [facility] for diagnosis of diverticulitis, there she was started on IV Zosyn and transition to p.o. Augmentin. Other accompanying symptoms that the patient complained of is fever, chills, weakness, lightheaded, headaches, nausea, mucoid stools, and shortness of breath that is chronic.  Patient denies chest pain, palpitations, cough, vomiting, diarrhea and constipation.     In the ED WBC 20.7, H&H 10.8/33.1, potassium 3.1, albumin 2.9, CT of the abdomen showing severe distal colitis involving the rectum and half of the sigmoid colon, 4.6 cm mass in her right lower lobe with surrounding consolidation. In the ED patient received 2 L of IV fluids, 3.375 of Zosyn, 50 mg of Toradol via IV, and 500 mg of Flagyl via IV.     Admit to medical surgery unit. Patient assessed in the ED, at the bedside, patient is alert and oriented, there is no acute distress noted. Patient agrees to admission for a diagnosis of colitis, treatment to include IV antibiotics, gentle hydration, and pulmonology consult due to incidental finding of a right lower lobe lung mass. DISCHARGE DIAGNOSES / PLAN:      Colitis with abdominal pain:  Evaluated by GI, Dr. Moni Cochran recommendations to continue oral antibiotics in the next 2 weeks, upon discharge. .  Leukocytosis improved.  afebrile.   Repeat ct showed severe inflammation of the sigmoid  Significantly improving in terms of symptoms. Status post 7 days of Levaquin and Flagyl. Discharged on additional 7 days of Levaquin and p.o. Flagyl.     Right-sided lung mass, new finding per CT 21:  Highly suspicious for primary lung malignancy with mediastinal and hilar LN involvement (ipsilateral)  Per pulmonology, Dr. Dirk Griffin, patient is ambivalent and equivalent at best in terms of getting further diagnosis is treatment given her age. Will arrange for outpatient follow-up.     Hyperkalemia:   Stop KDur supplements.      Paroxysmal atrial fibrillation:  Hx pacemaker, AV paced on tele. Continue carvedilol and Eliquis.     Hypertension:  Discontinue Norvasc however continue lisinopril 20 mg daily.     COPD/emphysema:  Chronic issue, has home O2 at 2 L/min. Stable, not in acute exacerbation.      DNR/DNI    Patient is stable enough to be discharged to [facility] skilled nursing facility on 2021.       PENDING TEST RESULTS:   At the time of discharge the following test results are still pendin    FOLLOW UP APPOINTMENTS:    Follow-up Information     Follow up With Specialties Details Why Contact Info    Halima Lawler MD Pulmonary Disease On 9/20/2021 at 1:00 pm [address]  [address] [facility]      Brandi Mendoza MD Internal Medicine   [address] [address]  [phone]             DIET: Cardiac Diet    ACTIVITY: Activity as tolerated      DISCHARGE MEDICATIONS:  Current Discharge Medication List      START taking these medications    Details   levoFLOXacin (LEVAQUIN) 750 mg tablet Take 1 Tablet by mouth daily for 7 days. Qty: 7 Tablet, Refills: 0  Start date: 9/13/2021, End date: 9/20/2021      metroNIDAZOLE (FlagyL) 500 mg tablet Take 1 Tablet by mouth three (3) times daily for 7 days. Qty: 21 Tablet, Refills: 0  Start date: 9/13/2021, End date: 9/20/2021         CONTINUE these medications which have NOT CHANGED    Details   tamsulosin (Flomax) 0.4 mg capsule Take 0.4 mg by mouth daily. albuterol (PROVENTIL HFA, VENTOLIN HFA, PROAIR HFA) 90 mcg/actuation inhaler Take 2 Puffs by inhalation every six (6) hours as needed for Wheezing. Indications: chronic obstructive pulmonary disease  Qty: 1 Inhaler, Refills: 3    Associated Diagnoses: Chronic obstructive pulmonary disease, unspecified COPD type (HCC)      promethazine (PHENERGAN) 25 mg tablet Take 1 Tab by mouth every twelve (12) hours as needed for Nausea. Qty: 60 Tab, Refills: 3    Associated Diagnoses: Nausea      lisinopriL (PRINIVIL, ZESTRIL) 20 mg tablet Take 20 mg by mouth daily. famotidine (PEPCID) 20 mg tablet Take 20 mg by mouth two (2) times a day. Linzess 72 mcg cap capsule Take 1 capsule by mouth once daily for 90 days  Qty: 90 Cap, Refills: 0      fluticasone propionate (FLONASE ALLERGY RELIEF NA) 1 Spray by Nasal route. Oxygen 2 Liters at night      apixaban (ELIQUIS) 2.5 mg tablet Take 2.5 mg by mouth two (2) times a day. simvastatin (ZOCOR) 20 mg tablet Take 20 mg by mouth nightly. calcium citrate-vitamin d3 (CITRACAL+D) 315 mg-5 mcg (200 unit) tab Take 1 Tab by mouth daily (with breakfast). Omega-3 Fatty Acids 60- mg cpDR Take  by mouth.      multivitamin (ONE A DAY) tablet Take 1 Tab by mouth daily. polyethylene glycol (MIRALAX) 17 gram/dose powder Take 17 g by mouth daily. furosemide (LASIX) 20 mg tablet Take 40 mg by mouth daily. carvediloL (COREG) 6.25 mg tablet Take 3.125 mg by mouth two (2) times daily (with meals). lidocaine (LIDODERM) 5 % Apply patch to the affected area for 12 hours a day and remove for 12 hours a day. Qty: 30 Each, Refills: 2    Associated Diagnoses: Trapezius muscle spasm      arformoteroL (BROVANA) 15 mcg/2 mL nebu neb solution 15 mcg by Nebulization route. cetirizine (ZYRTEC) 5 mg tablet Take 5 mg by mouth daily. levocetirizine (XYZAL) 5 mg tablet Take 5 mg by mouth daily. methocarbamoL (ROBAXIN) 500 mg tablet Take 1 Tab by mouth four (4) times daily. Qty: 360 Tab, Refills: 3    Associated Diagnoses: Night muscle spasms      cyanocobalamin (VITAMIN B12) 500 mcg tablet Take 500 mcg by mouth daily. STOP taking these medications       denosumab (Prolia) 60 mg/mL injection Comments:   Reason for Stopping:         amLODIPine (NORVASC) 5 mg tablet Comments:   Reason for Stopping:                 Recent Days:  Recent Labs     09/13/21 0348 09/12/21  0250 09/11/21  0500   WBC 11.6 10.4 9.4   HGB 10.8* 10.6* 10.3*   HCT 34.1* 34.7* 32.9*    389 368     Recent Labs     09/13/21 0348 09/12/21  0250 09/11/21  0500    139 139   K 3.9 5.3* 4.5    105 106   CO2 26 26 25   GLU 85 94 77   BUN 8* 6* <1*   CREA 0.70 0.70 0.60*   CA 8.7 9.1 8.3*   MG  --   --  1.7   PHOS  --   --  1.6*   ALB 2.6* 2.9*  --    TBILI 0.6 0.6  --    ALT 12 14  --      No results for input(s): PH, PCO2, PO2, HCO3, FIO2 in the last 72 hours.       Recent Results (from the past 336 hour(s))   CBC WITH AUTOMATED DIFF    Collection Time: 09/06/21 12:00 PM   Result Value Ref Range    WBC 20.7 (H) 4.6 - 13.2 K/uL    RBC 3.42 (L) 4.20 - 5.30 M/uL HGB 10.8 (L) 12.0 - 16.0 g/dL    HCT 33.1 (L) 35.0 - 45.0 %    MCV 96.8 78.0 - 100.0 FL    MCH 31.6 24.0 - 34.0 PG    MCHC 32.6 31.0 - 37.0 g/dL    RDW 13.5 11.6 - 14.5 %    PLATELET 446 071 - 775 K/uL    MPV 9.1 (L) 9.2 - 11.8 FL    NRBC 0.0 0.0  WBC    ABSOLUTE NRBC 0.00 0.00 - 0.01 K/uL    NEUTROPHILS 89 (H) 40 - 73 %    LYMPHOCYTES 11 (L) 21 - 52 %    MONOCYTES 0 (L) 3 - 10 %    EOSINOPHILS 0 0 - 5 %    BASOPHILS 0 0 - 2 %    IMMATURE GRANULOCYTES 0 %    ABS. NEUTROPHILS 18.4 (H) 1.8 - 8.0 K/UL    ABS. LYMPHOCYTES 2.3 0.9 - 3.6 K/UL    ABS. MONOCYTES 0.0 (L) 0.05 - 1.2 K/UL    ABS. EOSINOPHILS 0.0 0.0 - 0.4 K/UL    ABS. BASOPHILS 0.0 0.0 - 0.1 K/UL    ABS. IMM. GRANS. 0.0 K/UL    DF AUTOMATED      RBC COMMENTS Normocytic, Normochromic     METABOLIC PANEL, COMPREHENSIVE    Collection Time: 09/06/21 12:00 PM   Result Value Ref Range    Sodium 140 135 - 145 mmol/L    Potassium 3.1 (L) 3.2 - 5.1 mmol/L    Chloride 105 94 - 111 mmol/L    CO2 25 21 - 33 mmol/L    Anion gap 10 mmol/L    Glucose 125 (H) 70 - 110 mg/dL    BUN 8 (L) 9 - 21 mg/dL    Creatinine 0.50 (L) 0.70 - 1.20 mg/dL    BUN/Creatinine ratio 16      GFR est AA >60 ml/min/1.73m2    GFR est non-AA >60 ml/min/1.73m2    Calcium 9.0 8.5 - 10.5 mg/dL    Bilirubin, total 0.5 0.2 - 1.0 mg/dL    AST (SGOT) 28 14 - 74 U/L    ALT (SGPT) 19 3 - 52 U/L    Alk.  phosphatase 59 38 - 126 U/L    Protein, total 6.4 6.1 - 8.4 g/dL    Albumin 2.9 (L) 3.5 - 4.7 g/dL    Globulin 3.5 g/dL    A-G Ratio 0.8     MAGNESIUM    Collection Time: 09/06/21 12:00 PM   Result Value Ref Range    Magnesium 1.9 1.7 - 2.8 mg/dL   METABOLIC PANEL, BASIC    Collection Time: 09/07/21  5:00 AM   Result Value Ref Range    Sodium 139 135 - 145 mmol/L    Potassium 3.2 3.2 - 5.1 mmol/L    Chloride 109 94 - 111 mmol/L    CO2 22 21 - 33 mmol/L    Anion gap 8 mmol/L    Glucose 83 70 - 110 mg/dL    BUN 12 9 - 21 mg/dL    Creatinine 0.80 0.70 - 1.20 mg/dL    BUN/Creatinine ratio 15      GFR est AA >60 ml/min/1.73m2    GFR est non-AA >60 ml/min/1.73m2    Calcium 7.8 (L) 8.5 - 10.5 mg/dL   MAGNESIUM    Collection Time: 09/07/21  5:00 AM   Result Value Ref Range    Magnesium 1.7 1.7 - 2.8 mg/dL   CBC W/O DIFF    Collection Time: 09/07/21  5:00 AM   Result Value Ref Range    WBC 18.8 (H) 4.6 - 13.2 K/uL    RBC 3.33 (L) 4.20 - 5.30 M/uL    HGB 10.4 (L) 12.0 - 16.0 g/dL    HCT 32.4 (L) 35.0 - 45.0 %    MCV 97.3 78.0 - 100.0 FL    MCH 31.2 24.0 - 34.0 PG    MCHC 32.1 31.0 - 37.0 g/dL    RDW 13.9 11.6 - 14.5 %    PLATELET 544 923 - 503 K/uL    MPV 9.4 9.2 - 11.8 FL    NRBC 0.0 0.0  WBC    ABSOLUTE NRBC 0.00 0.00 - 2.48 K/uL   METABOLIC PANEL, BASIC    Collection Time: 09/08/21  4:53 AM   Result Value Ref Range    Sodium 139 135 - 145 mmol/L    Potassium 3.0 (L) 3.2 - 5.1 mmol/L    Chloride 109 94 - 111 mmol/L    CO2 21 21 - 33 mmol/L    Anion gap 9 mmol/L    Glucose 84 70 - 110 mg/dL    BUN 8 (L) 9 - 21 mg/dL    Creatinine 0.40 (L) 0.70 - 1.20 mg/dL    BUN/Creatinine ratio 20      GFR est AA >60 ml/min/1.73m2    GFR est non-AA >60 ml/min/1.73m2    Calcium 8.0 (L) 8.5 - 10.5 mg/dL   MAGNESIUM    Collection Time: 09/08/21  4:53 AM   Result Value Ref Range    Magnesium 1.8 1.7 - 2.8 mg/dL   CBC W/O DIFF    Collection Time: 09/08/21  4:53 AM   Result Value Ref Range    WBC 16.3 (H) 4.6 - 13.2 K/uL    RBC 2.99 (L) 4.20 - 5.30 M/uL    HGB 9.2 (L) 12.0 - 16.0 g/dL    HCT 28.9 (L) 35.0 - 45.0 %    MCV 96.7 78.0 - 100.0 FL    MCH 30.8 24.0 - 34.0 PG    MCHC 31.8 31.0 - 37.0 g/dL    RDW 14.0 11.6 - 14.5 %    PLATELET 786 462 - 149 K/uL    MPV 9.5 9.2 - 11.8 FL    NRBC 0.0 0.0  WBC    ABSOLUTE NRBC 0.00 0.00 - 3.90 K/uL   METABOLIC PANEL, BASIC    Collection Time: 09/09/21  4:57 AM   Result Value Ref Range    Sodium 139 135 - 145 mmol/L    Potassium 3.6 3.2 - 5.1 mmol/L    Chloride 108 94 - 111 mmol/L    CO2 24 21 - 33 mmol/L    Anion gap 7 mmol/L    Glucose 90 70 - 110 mg/dL    BUN 5 (L) 9 - 21 mg/dL Creatinine 0.50 (L) 0.70 - 1.20 mg/dL    BUN/Creatinine ratio 10      GFR est AA >60 ml/min/1.73m2    GFR est non-AA >60 ml/min/1.73m2    Calcium 7.8 (L) 8.5 - 10.5 mg/dL   CBC W/O DIFF    Collection Time: 09/09/21  4:57 AM   Result Value Ref Range    WBC 9.9 4.6 - 13.2 K/uL    RBC 3.15 (L) 4.20 - 5.30 M/uL    HGB 9.6 (L) 12.0 - 16.0 g/dL    HCT 30.5 (L) 35.0 - 45.0 %    MCV 96.8 78.0 - 100.0 FL    MCH 30.5 24.0 - 34.0 PG    MCHC 31.5 31.0 - 37.0 g/dL    RDW 14.1 11.6 - 14.5 %    PLATELET 204 391 - 476 K/uL    MPV 9.4 9.2 - 11.8 FL    NRBC 0.0 0.0  WBC    ABSOLUTE NRBC 0.00 0.00 - 0.01 K/uL   CBC WITH AUTOMATED DIFF    Collection Time: 09/11/21  5:00 AM   Result Value Ref Range    WBC 9.4 4.6 - 13.2 K/uL    RBC 3.37 (L) 4.20 - 5.30 M/uL    HGB 10.3 (L) 12.0 - 16.0 g/dL    HCT 32.9 (L) 35.0 - 45.0 %    MCV 97.6 78.0 - 100.0 FL    MCH 30.6 24.0 - 34.0 PG    MCHC 31.3 31.0 - 37.0 g/dL    RDW 14.0 11.6 - 14.5 %    PLATELET 277 432 - 673 K/uL    MPV 9.3 9.2 - 11.8 FL    NRBC 0.0 0.0  WBC    ABSOLUTE NRBC 0.00 0.00 - 0.01 K/uL    NEUTROPHILS 70 40 - 73 %    LYMPHOCYTES 18 (L) 21 - 52 %    MONOCYTES 9 3 - 10 %    EOSINOPHILS 2 0 - 5 %    BASOPHILS 0 0 - 2 %    IMMATURE GRANULOCYTES 1 (H) 0 - 0.5 %    ABS. NEUTROPHILS 6.5 1.8 - 8.0 K/UL    ABS. LYMPHOCYTES 1.6 0.9 - 3.6 K/UL    ABS. MONOCYTES 0.9 0.05 - 1.2 K/UL    ABS. EOSINOPHILS 0.2 0.0 - 0.4 K/UL    ABS. BASOPHILS 0.0 0.0 - 0.1 K/UL    ABS. IMM.  GRANS. 0.1 (H) 0.00 - 0.04 K/UL    DF AUTOMATED     MAGNESIUM    Collection Time: 09/11/21  5:00 AM   Result Value Ref Range    Magnesium 1.7 1.7 - 2.8 mg/dL   METABOLIC PANEL, BASIC    Collection Time: 09/11/21  5:00 AM   Result Value Ref Range    Sodium 139 135 - 145 mmol/L    Potassium 4.5 3.2 - 5.1 mmol/L    Chloride 106 94 - 111 mmol/L    CO2 25 21 - 33 mmol/L    Anion gap 8 mmol/L    Glucose 77 70 - 110 mg/dL    BUN <1 (L) 9 - 21 mg/dL    Creatinine 0.60 (L) 0.70 - 1.20 mg/dL    BUN/Creatinine ratio Not calculated      GFR est AA >60 ml/min/1.73m2    GFR est non-AA >60 ml/min/1.73m2    Calcium 8.3 (L) 8.5 - 10.5 mg/dL   PHOSPHORUS    Collection Time: 09/11/21  5:00 AM   Result Value Ref Range    Phosphorus 1.6 (L) 2.5 - 4.5 mg/dL   CBC WITH AUTOMATED DIFF    Collection Time: 09/12/21  2:50 AM   Result Value Ref Range    WBC 10.4 4.6 - 13.2 K/uL    RBC 3.52 (L) 4.20 - 5.30 M/uL    HGB 10.6 (L) 12.0 - 16.0 g/dL    HCT 34.7 (L) 35.0 - 45.0 %    MCV 98.6 78.0 - 100.0 FL    MCH 30.1 24.0 - 34.0 PG    MCHC 30.5 (L) 31.0 - 37.0 g/dL    RDW 14.3 11.6 - 14.5 %    PLATELET 905 163 - 931 K/uL    MPV 9.3 9.2 - 11.8 FL    NRBC 0.0 0.0  WBC    ABSOLUTE NRBC 0.00 0.00 - 0.01 K/uL    NEUTROPHILS 62 40 - 73 %    LYMPHOCYTES 24 21 - 52 %    MONOCYTES 10 3 - 10 %    EOSINOPHILS 3 0 - 5 %    BASOPHILS 0 0 - 2 %    IMMATURE GRANULOCYTES 1 (H) 0 - 0.5 %    ABS. NEUTROPHILS 6.4 1.8 - 8.0 K/UL    ABS. LYMPHOCYTES 2.5 0.9 - 3.6 K/UL    ABS. MONOCYTES 1.0 0.05 - 1.2 K/UL    ABS. EOSINOPHILS 0.3 0.0 - 0.4 K/UL    ABS. BASOPHILS 0.0 0.0 - 0.1 K/UL    ABS. IMM. GRANS. 0.1 (H) 0.00 - 0.04 K/UL    DF AUTOMATED     METABOLIC PANEL, COMPREHENSIVE    Collection Time: 09/12/21  2:50 AM   Result Value Ref Range    Sodium 139 135 - 145 mmol/L    Potassium 5.3 (H) 3.2 - 5.1 mmol/L    Chloride 105 94 - 111 mmol/L    CO2 26 21 - 33 mmol/L    Anion gap 8 mmol/L    Glucose 94 70 - 110 mg/dL    BUN 6 (L) 9 - 21 mg/dL    Creatinine 0.70 0.70 - 1.20 mg/dL    BUN/Creatinine ratio 9      GFR est AA >60 ml/min/1.73m2    GFR est non-AA >60 ml/min/1.73m2    Calcium 9.1 8.5 - 10.5 mg/dL    Bilirubin, total 0.6 0.2 - 1.0 mg/dL    AST (SGOT) 24 14 - 74 U/L    ALT (SGPT) 14 3 - 52 U/L    Alk.  phosphatase 57 38 - 126 U/L    Protein, total 6.1 6.1 - 8.4 g/dL    Albumin 2.9 (L) 3.5 - 4.7 g/dL    Globulin 3.2 g/dL    A-G Ratio 0.9     METABOLIC PANEL, COMPREHENSIVE    Collection Time: 09/13/21  3:48 AM   Result Value Ref Range    Sodium 137 135 - 145 mmol/L Potassium 3.9 3.2 - 5.1 mmol/L    Chloride 103 94 - 111 mmol/L    CO2 26 21 - 33 mmol/L    Anion gap 8 mmol/L    Glucose 85 70 - 110 mg/dL    BUN 8 (L) 9 - 21 mg/dL    Creatinine 0.70 0.70 - 1.20 mg/dL    BUN/Creatinine ratio 11      GFR est AA >60 ml/min/1.73m2    GFR est non-AA >60 ml/min/1.73m2    Calcium 8.7 8.5 - 10.5 mg/dL    Bilirubin, total 0.6 0.2 - 1.0 mg/dL    AST (SGOT) 19 14 - 74 U/L    ALT (SGPT) 12 3 - 52 U/L    Alk. phosphatase 48 38 - 126 U/L    Protein, total 5.5 (L) 6.1 - 8.4 g/dL    Albumin 2.6 (L) 3.5 - 4.7 g/dL    Globulin 2.9 g/dL    A-G Ratio 0.9     CBC WITH AUTOMATED DIFF    Collection Time: 09/13/21  3:48 AM   Result Value Ref Range    WBC 11.6 4.6 - 13.2 K/uL    RBC 3.49 (L) 4.20 - 5.30 M/uL    HGB 10.8 (L) 12.0 - 16.0 g/dL    HCT 34.1 (L) 35.0 - 45.0 %    MCV 97.7 78.0 - 100.0 FL    MCH 30.9 24.0 - 34.0 PG    MCHC 31.7 31.0 - 37.0 g/dL    RDW 14.3 11.6 - 14.5 %    PLATELET 200 169 - 204 K/uL    MPV 9.5 9.2 - 11.8 FL    NRBC 0.0 0.0  WBC    ABSOLUTE NRBC 0.00 0.00 - 0.01 K/uL    NEUTROPHILS 68 40 - 73 %    LYMPHOCYTES 19 (L) 21 - 52 %    MONOCYTES 10 3 - 10 %    EOSINOPHILS 2 0 - 5 %    BASOPHILS 0 0 - 2 %    IMMATURE GRANULOCYTES 1 (H) 0 - 0.5 %    ABS. NEUTROPHILS 7.9 1.8 - 8.0 K/UL    ABS. LYMPHOCYTES 2.2 0.9 - 3.6 K/UL    ABS. MONOCYTES 1.2 0.05 - 1.2 K/UL    ABS. EOSINOPHILS 0.2 0.0 - 0.4 K/UL    ABS. BASOPHILS 0.0 0.0 - 0.1 K/UL    ABS. IMM. GRANS. 0.1 (H) 0.00 - 0.04 K/UL    DF AUTOMATED          NOTIFY YOUR PHYSICIAN FOR ANY OF THE FOLLOWING:   Fever over 101 degrees for 24 hours. Chest pain, shortness of breath, fever, chills, nausea, vomiting, diarrhea, change in mentation, falling, weakness, bleeding. Severe pain or pain not relieved by medications. Or, any other signs or symptoms that you may have questions about.     DISPOSITION:    Home With:   OT  PT  HH  RN      xx Long term SNF/Inpatient Rehab    Independent/assisted living    Hospice    Other:       PATIENT CONDITION AT DISCHARGE:     Functional status   x Poor     Deconditioned     Independent      Cognition     Lucid     Forgetful    x Dementia      Catheters/lines (plus indication)    [place]     PICC     PEG    xx None      Code status     Full code    x DNR      PHYSICAL EXAMINATION AT DISCHARGE:  General:          Alert, cooperative, no distress, appears stated age. Neck:               Supple, symmetrical  Lungs:             Clear to auscultation bilaterally. No Wheezing or Rhonchi. No rales. Chest wall:      No tenderness  No Accessory muscle use. Heart:              Regular  rhythm,  No  murmur   No edema  Abdomen:        Soft, non-tender. Not distended. Bowel sounds normal  Extremities:     No cyanosis. No clubbing,                            Skin turgor normal, Capillary refill normal  Skin:                Not pale. Not Jaundiced  No rashes   Psych:             Not anxious or agitated. Neurologic:      Alert, moves all extremities, answers questions appropriately and responds to commands       CHRONIC MEDICAL DIAGNOSES:  Problem List as of 9/13/2021 Date Reviewed: 6/21/2021        Codes Class Noted - Resolved    * (Principal) Colitis ICD-10-CM: K52.9  ICD-9-CM: 558.9  9/6/2021 - Present        Urinary retention ICD-10-CM: R33.9  ICD-9-CM: 788.20  9/6/2021 - Present        Paroxysmal atrial fibrillation ([facility] ICD-10-CM: I48.0  ICD-9-CM: 427.31  9/6/2021 - Present        Abdominal pain ICD-10-CM: R10.9  ICD-9-CM: 789.00  9/6/2021 - Present        Lung mass ICD-10-CM: R91.8  ICD-9-CM: 786.6  9/6/2021 - Present        Hypokalemia ICD-10-CM: E87.6  ICD-9-CM: 276.8  9/6/2021 - Present        Allergic rhinitis ICD-10-CM: J30.9  ICD-9-CM: 477.9  Unknown - Present    Overview Signed 9/1/2020 11:55 AM by Emanuel Shoemaker     04- visitStable. No flare-ups.   Last modified by uma  10-, 09:13             Chronic obstructive lung disease ([facility] [address] ICD-10-CM: J44.9  ICD-9-CM: 222  Unknown - Present Overview Signed 9/1/2020 12:00 PM by Emanuel Shoemaker     11- visitsees pulmonology (Dr. Issa Benitez) for for COPD.  no signs of exacerbation. REQUESTING ORFDER FOR CXR ( MISSED THE ORDER DONE BY DR Hernesto Licea FOR FU LEONARDO)  Last modified by uma  10-, 09:13             Congestive heart failure (CHF) ([facility] ICD-10-CM: I50.9  ICD-9-CM: 428.0  Unknown - Present    Overview Signed 9/1/2020 12:01 PM by Emanuel Shoemaker     With acid    08- visitSees cardiology. No issues. Last modified by DELORES Glover  10-, 09:13             Essential hypertension ICD-10-CM: I10  ICD-9-CM: 401.9  Unknown - Present    Overview Signed 9/1/2020 12:03 PM by Emanuel Shoemaker     04- visitBP stable. continue meds  -Discussed checking BP at home occasionally and recording for f/u visit.  -Patient is aware to call our office if BP is greater than 150/90 mmHg or less than 100/60 mmHg.  -Discussed low salt dieting and exercising 150 minutes/week.   Last modified by DELORES Glover  10-, 09:13             Gastroesophageal reflux disease ICD-10-CM: K21.9  ICD-9-CM: 530.81  Unknown - Present    Overview Signed 9/1/2020 12:03 PM by Emanuel Shoemaker     10- visitnot controlled  change Zantac to Pepcid  Last modified by DELORES Glover  10-, 09:13             Hyperlipidemia ICD-10-CM: E78.5  ICD-9-CM: 272.4  Unknown - Present        Myocardial infarction ([facility] ICD-10-CM: I21.9  ICD-9-CM: 410.90  Unknown - Present        Osteopenia ICD-10-CM: M85.80  ICD-9-CM: 733.90  Unknown - Present    Overview Signed 9/1/2020 12:05 PM by Emanuel Shoemaker     Takes calcium and vitamin D             Chronic constipation ICD-10-CM: K59.09  ICD-9-CM: 564.00  10/22/2018 - Present        Diverticular disease ICD-10-CM: K57.90  ICD-9-CM: 562.10  9/4/2018 - Present        Pulmonary hypertension ([facility] [address] [facility] ICD-10-CM: I27.20  ICD-9-CM: 416.8  9/4/2018 - Present        Coronary arteriosclerosis ICD-10-CM: I25.10  ICD-9-CM: 414.00  8/30/2018 - Present        Cardiac pacemaker in situ ICD-10-CM: Z95.0  ICD-9-CM: V45.01  1/1/2007 - Present    Overview Signed 9/1/2020 11:55 AM by Jose Manuel Yousif     04- visitSays she just had a new pacemaker put in back in August 2018. Sees cardiology routinely.   Entered by Mary Dennis  08-, 09:49                   Greater than 35 minutes were spent with the patient on counseling and coordination of care    Signed:   Mae Engel MD  9/13/2021  1:08 PM

## 2021-09-14 VITALS
DIASTOLIC BLOOD PRESSURE: 54 MMHG | TEMPERATURE: 98.2 F | SYSTOLIC BLOOD PRESSURE: 108 MMHG | OXYGEN SATURATION: 95 % | WEIGHT: 117.6 LBS | RESPIRATION RATE: 18 BRPM | HEIGHT: 63 IN | BODY MASS INDEX: 20.84 KG/M2 | HEART RATE: 106 BPM

## 2021-09-14 PROCEDURE — 74011250637 HC RX REV CODE- 250/637: Performed by: NURSE PRACTITIONER

## 2021-09-14 PROCEDURE — 77010033678 HC OXYGEN DAILY

## 2021-09-14 PROCEDURE — 74011250637 HC RX REV CODE- 250/637: Performed by: INTERNAL MEDICINE

## 2021-09-14 PROCEDURE — 94761 N-INVAS EAR/PLS OXIMETRY MLT: CPT

## 2021-09-14 RX ADMIN — FUROSEMIDE 40 MG: 40 TABLET ORAL at 08:00

## 2021-09-14 RX ADMIN — Medication 1 TABLET: at 08:00

## 2021-09-14 RX ADMIN — THERA TABS 1 TABLET: TAB at 08:00

## 2021-09-14 RX ADMIN — TAMSULOSIN HYDROCHLORIDE 0.4 MG: 0.4 CAPSULE ORAL at 08:00

## 2021-09-14 RX ADMIN — APIXABAN 2.5 MG: 2.5 TABLET, FILM COATED ORAL at 08:00

## 2021-09-14 RX ADMIN — METRONIDAZOLE 500 MG: 250 TABLET ORAL at 08:00

## 2021-09-14 RX ADMIN — CARVEDILOL 3.12 MG: 3.12 TABLET, FILM COATED ORAL at 07:55

## 2021-09-14 RX ADMIN — FAMOTIDINE 10 MG: 20 TABLET, FILM COATED ORAL at 08:00

## 2021-09-14 RX ADMIN — TRAMADOL HYDROCHLORIDE 50 MG: 50 TABLET, FILM COATED ORAL at 07:56

## 2021-09-14 NOTE — PROGRESS NOTES
Hospitalist Progress Note     INTERNAL MEDICINE PROGRESS NOTE  Patient: Skip Posey   YOB: 1934   MRN: [id number]      Hospital course / Assessment    Colitis with abdominal pain:  Right-sided lung mass, new finding per CT 21  Hyperkalemia:   Paroxysmal atrial fibrillation:  Hypertension:  COPD/emphysema:       DNR/DNI  Today Recommendation and Plan:   Medically stable   Ok with d/c plan today     Disposition    Disposition: SNF    Subjective / ROS:   Patient states still having some MATOS       Medical Decision Making   Chart, Images and Lab data reviewed, necessary medical Orders placed   Discussed with nursing staff     Vitals:    21 0105 21 0748 21 0755 21 0936   BP: 119/72   (!) 108/54   Pulse: 69  (!) 106 (!) 106   Resp: 18   18   Temp: 96.9 °F (36.1 °C)   98.2 °F (36.8 °C)   SpO2: 96% 95%  95%   Weight:       Height:         Temp (24hrs), Av.1 °F (36.2 °C), Min:96.4 °F (35.8 °C), Max:98.2 °F (36.8 °C)      Intake/Output Summary (Last 24 hours) at 2021 1046  Last data filed at 2021 1555  Gross per 24 hour   Intake    Output 4 ml   Net -4 ml       Physical Exam:   General Appearance:   Appears in no acute distress.,  HEENT:   Moist oral mucous membranes, conjunctiva clear,   Neck:   Supple  Lungs: No wheezes. , No rales. , Normal respiratory effort,   Heart:   Regular rate and rhythm  Abdomen:   Soft , Non-distended and Non-tender,   Extremities:   trace edema of legs  Neuro:   alert, oriented, moves all extremities well    Current medications:     Current Facility-Administered Medications   Medication Dose Route Frequency    metroNIDAZOLE (FLAGYL) tablet 500 mg  500 mg Oral TID    levoFLOXacin (LEVAQUIN) tablet 750 mg  750 mg Oral Q48H    famotidine (PEPCID) tablet 10 mg  10 mg Oral BID    sodium chloride (NS) flush 5-40 mL  5-40 mL IntraVENous Q8H    sodium chloride (NS) flush 5-40 mL  5-40 mL IntraVENous PRN    acetaminophen (TYLENOL) tablet 650 mg  650 mg Oral Q6H PRN    Or    acetaminophen (TYLENOL) suppository 650 mg  650 mg Rectal Q6H PRN    polyethylene glycol (MIRALAX) packet 17 g  17 g Oral DAILY PRN    ondansetron (ZOFRAN ODT) tablet 4 mg  4 mg Oral Q8H PRN    Or    ondansetron (ZOFRAN) injection 4 mg  4 mg IntraVENous Q6H PRN    melatonin tablet 3 mg  3 mg Oral QHS PRN    traMADoL (ULTRAM) tablet 50 mg  50 mg Oral Q6H PRN    albuterol (PROVENTIL HFA, VENTOLIN HFA, PROAIR HFA) inhaler 2 Puff  2 Puff Inhalation Q6H PRN    [Held by provider] amLODIPine (NORVASC) tablet 5 mg  5 mg Oral DAILY    apixaban (ELIQUIS) tablet 2.5 mg  2.5 mg Oral BID    calcium-vitamin D (OS-MOOK +D3) 250 mg-125 unit per tablet 1 Tablet  1 Tablet Oral DAILY    carvediloL (COREG) tablet 3.125 mg  3.125 mg Oral BID WITH MEALS    fluticasone propionate (FLONASE) 50 mcg/actuation nasal spray 1 Spray  1 Spray Both Nostrils DAILY    furosemide (LASIX) tablet 40 mg  40 mg Oral DAILY    [Held by provider] lisinopriL (PRINIVIL, ZESTRIL) tablet 20 mg  20 mg Oral DAILY    therapeutic multivitamin (THERAGRAN) tablet 1 Tablet  1 Tablet Oral DAILY    tamsulosin (FLOMAX) capsule 0.4 mg  0.4 mg Oral DAILY    atorvastatin (LIPITOR) tablet 20 mg  20 mg Oral QHS          Laboratory and Radiology Data :      No results found for: FERR  WBC   Date Value Ref Range Status   09/13/2021 11.6 4.6 - 13.2 K/uL Final     No results found for: REUBEN  No results found for: UEO    Microbiology    No results found for: GMS    Recent Results (from the past 24 hour(s))   COVID-19 RAPID TEST    Collection Time: 09/13/21  1:00 PM   Result Value Ref Range    Specimen source Nasopharyngeal      COVID-19 rapid test Not Detected Not Detected         XR Results:  Results from Hospital Encounter encounter on 06/21/21    XR CHEST PA LAT    Narrative  EXAM: Chest Radiography    CLINICAL INDICATION/HISTORY: Shortness of breath;  > Additional: None    COMPARISON: 3/9/2020    TECHNIQUE: PA and lateral    _______________    FINDINGS:    HEART AND MEDIASTINUM: Normal heart size with normal mediastinal contours. Biventricular transvenous cardiac pacemaker/AICD in situ, with left-sided  generator. LUNGS AND PLEURAL SPACES: Clear. BONY THORAX AND SOFT TISSUES: There are several thoracic compression fractures  present. The previous chest radiograph was performed as a portable frontal view. There is an old CT of the thorax dated 1/27/2020, showing all of these  compression fractures to be unchanged and therefore chronic.    _______________    Impression  No active cardiopulmonary disease. Chronic thoracic spine compression fractures  as noted. CT Results:  Results from Hospital Encounter encounter on 09/06/21    CT ABD PELV W CONT    Narrative  EXAM: CT of the abdomen and pelvis    CLINICAL INDICATION/HISTORY: Abdominal pain. COMPARISON: Chest CT dated 9/7/2021, abdominal CT dated 9/6/2021    TECHNIQUE: Axial CT imaging of the abdomen and pelvis was performed with  intravenous contrast. Multiplanar reformats were generated. One or more dose  reduction techniques were used on this CT: automated exposure control,  adjustment of the mAs and/or kVp according to patient size, and iterative  reconstruction techniques. The specific techniques used on this CT exam have  been documented in the patient's electronic medical record. Digital Imaging and  Communications in Medicine (DICOM) format image data are available to  nonaffiliated external healthcare facilities or entities on a secured, media  free, reciprocally searchable basis with patient authorization for at least a  12-month period after this study. _______________    FINDINGS:    LOWER CHEST: Right lower lobe mass with surrounding consolidation and/or  atelectasis again identified as seen previously. LIVER, BILIARY: Small hypodensity in the inferior right hepatic lobe measuring  less than 1 cm in size on image 69 is indeterminant. Dystrophic calcifications  near the dome are unchanged. No biliary dilation. Gallbladder is unremarkable. PANCREAS: Normal.    SPLEEN: Normal.    ADRENALS: Normal.    KIDNEYS/URETERS/BLADDER: Simple renal cysts which do not require follow-up  imaging. No hydronephrosis. Bladder is moderately distended. PELVIC ORGANS: Hysterectomy. LYMPH NODES: No enlarged lymph nodes. GASTROINTESTINAL TRACT: Severe sigmoid diverticulosis. Long segment wall  thickening and edema involving the sigmoid colon which is relatively similar or  perhaps slightly improved compared to the prior study. Mild pericolonic  stranding and inflammation around the sigmoid colon. No pericolonic abscess. No  bowel obstruction. VASCULATURE: Atherosclerosis. BONES: Lumbar scoliosis with multilevel degenerative changes. Decreased bone  mineral density with chr lower thoracic and lumbar compression fractures. OTHER: Small amount of free fluid in the pelvis.    _______________    Impression  1. Extensive sigmoid diverticulosis with diffuse sigmoid colon wall thickening  and edema which is similar to slightly improved compared to the prior study. There is also persistent pericolonic fluid, stranding, and inflammation around  the sigmoid colon with no pericolonic abscess identified. No bowel obstruction. Findings could relate to sigmoid diverticulitis and/or colitis. 2. Right lower lobe lung mass with surrounding consolidation which is suspicious  for malignancy. 3. Subcentimeter hypodensity in the inferior right hepatic lobe is  indeterminate. 4. Moderate bladder distention. MRI Results:  No results found for this or any previous visit. Nuclear Medicine Results:  No results found for this or any previous visit. US Results:  No results found for this or any previous visit. IR Results:  No results found for this or any previous visit.       VAS/US Results:  No results found for this or any previous visit.              Duane Dadds M.D.   Hospitalist

## 2021-09-30 ENCOUNTER — APPOINTMENT (OUTPATIENT)
Dept: CT IMAGING | Age: 86
DRG: 372 | End: 2021-09-30
Attending: EMERGENCY MEDICINE
Payer: MEDICARE

## 2021-09-30 ENCOUNTER — HOSPITAL ENCOUNTER (INPATIENT)
Age: 86
LOS: 5 days | Discharge: HOME HEALTH | DRG: 372 | End: 2021-10-06
Attending: EMERGENCY MEDICINE | Admitting: INTERNAL MEDICINE
Payer: MEDICARE

## 2021-09-30 DIAGNOSIS — R53.81 MALAISE AND FATIGUE: ICD-10-CM

## 2021-09-30 DIAGNOSIS — R53.83 MALAISE AND FATIGUE: ICD-10-CM

## 2021-09-30 DIAGNOSIS — J44.9 CHRONIC OBSTRUCTIVE PULMONARY DISEASE, UNSPECIFIED COPD TYPE (HCC): ICD-10-CM

## 2021-09-30 DIAGNOSIS — J43.9 PULMONARY EMPHYSEMA, UNSPECIFIED EMPHYSEMA TYPE (HCC): ICD-10-CM

## 2021-09-30 DIAGNOSIS — K57.92 DIVERTICULITIS: Primary | ICD-10-CM

## 2021-09-30 DIAGNOSIS — M62.838 NIGHT MUSCLE SPASMS: ICD-10-CM

## 2021-09-30 PROCEDURE — 85025 COMPLETE CBC W/AUTO DIFF WBC: CPT

## 2021-09-30 PROCEDURE — 82150 ASSAY OF AMYLASE: CPT

## 2021-09-30 PROCEDURE — 83605 ASSAY OF LACTIC ACID: CPT

## 2021-09-30 PROCEDURE — 80053 COMPREHEN METABOLIC PANEL: CPT

## 2021-09-30 PROCEDURE — 74177 CT ABD & PELVIS W/CONTRAST: CPT

## 2021-09-30 PROCEDURE — 99285 EMERGENCY DEPT VISIT HI MDM: CPT

## 2021-09-30 PROCEDURE — 83690 ASSAY OF LIPASE: CPT

## 2021-09-30 RX ORDER — MORPHINE SULFATE 2 MG/ML
1 INJECTION, SOLUTION INTRAMUSCULAR; INTRAVENOUS
Status: COMPLETED | OUTPATIENT
Start: 2021-09-30 | End: 2021-10-01

## 2021-09-30 NOTE — Clinical Note
Status[de-identified] INPATIENT [101]   Type of Bed: Telemetry [19]   Cardiac Monitoring Required?: No   Inpatient Hospitalization Certified Necessary for the Following Reasons: 3.  Patient receiving treatment that can only be provided in an inpatient setting (further clarification in H&P documentation)   Admitting Diagnosis: Diverticulitis [275391]   Admitting Diagnosis: COPD (chronic obstructive pulmonary disease) [facility] [[id number]]   Admitting Physician: Marlon Mendoza [[id number]]   Attending Physician: Marlon Mendoza [[id number]]   Estimated Length of Stay: 2 Midnights   Discharge Plan[de-identified] Extended Care Facility (e.g. Adult Home, Nursing Home, etc.)

## 2021-10-01 ENCOUNTER — APPOINTMENT (OUTPATIENT)
Dept: GENERAL RADIOLOGY | Age: 86
DRG: 372 | End: 2021-10-01
Attending: EMERGENCY MEDICINE
Payer: MEDICARE

## 2021-10-01 PROBLEM — R53.81 PHYSICAL DECONDITIONING: Status: ACTIVE | Noted: 2021-10-01

## 2021-10-01 PROBLEM — A04.72 CLOSTRIDIUM DIFFICILE COLITIS: Status: ACTIVE | Noted: 2021-10-01

## 2021-10-01 PROBLEM — J44.9 COPD (CHRONIC OBSTRUCTIVE PULMONARY DISEASE) (HCC): Status: ACTIVE | Noted: 2021-10-01

## 2021-10-01 PROBLEM — E86.0 DEHYDRATION: Status: ACTIVE | Noted: 2021-10-01

## 2021-10-01 PROBLEM — K57.92 DIVERTICULITIS: Status: ACTIVE | Noted: 2021-10-01

## 2021-10-01 LAB
ALBUMIN SERPL-MCNC: 2.9 G/DL (ref 3.5–4.7)
ALBUMIN/GLOB SERPL: 0.7 {RATIO}
ALP SERPL-CCNC: 41 U/L (ref 38–126)
ALT SERPL-CCNC: 15 U/L (ref 3–52)
AMYLASE SERPL-CCNC: 29 U/L (ref 30–100)
ANION GAP SERPL CALC-SCNC: 9 MMOL/L
AST SERPL W P-5'-P-CCNC: 25 U/L (ref 14–74)
BASOPHILS # BLD: 0 K/UL (ref 0–0.1)
BASOPHILS NFR BLD: 0 % (ref 0–2)
BILIRUB SERPL-MCNC: 0.8 MG/DL (ref 0.2–1)
BUN SERPL-MCNC: 10 MG/DL (ref 9–21)
BUN/CREAT SERPL: 20
C DIFF GDH STL QL: POSITIVE
C DIFF TOX A+B STL QL IA: POSITIVE
CA-I BLD-MCNC: 8.6 MG/DL (ref 8.5–10.5)
CHLORIDE SERPL-SCNC: 104 MMOL/L (ref 94–111)
CO2 SERPL-SCNC: 25 MMOL/L (ref 21–33)
COVID-19 RAPID TEST, COVR: NOT DETECTED
CREAT SERPL-MCNC: 0.5 MG/DL (ref 0.7–1.2)
DIFFERENTIAL METHOD BLD: ABNORMAL
EOSINOPHIL # BLD: 0 K/UL (ref 0–0.4)
EOSINOPHIL NFR BLD: 0 % (ref 0–5)
ERYTHROCYTE [DISTWIDTH] IN BLOOD BY AUTOMATED COUNT: 15.9 % (ref 11.6–14.5)
GLOBULIN SER CALC-MCNC: 4 G/DL
GLUCOSE SERPL-MCNC: 113 MG/DL (ref 70–110)
HCT VFR BLD AUTO: 36.4 % (ref 35–45)
HGB BLD-MCNC: 11.5 G/DL (ref 12–16)
IMM GRANULOCYTES # BLD AUTO: 0.1 K/UL (ref 0–0.04)
IMM GRANULOCYTES NFR BLD AUTO: 0 % (ref 0–0.5)
INTERPRETATION: ABNORMAL
LACTATE SERPL-SCNC: 1 MMOL/L (ref 0.5–2)
LIPASE SERPL-CCNC: 23 U/L (ref 10–57)
LYMPHOCYTES # BLD: 1 K/UL (ref 0.9–3.6)
LYMPHOCYTES NFR BLD: 8 % (ref 21–52)
MCH RBC QN AUTO: 31.5 PG (ref 24–34)
MCHC RBC AUTO-ENTMCNC: 31.6 G/DL (ref 31–37)
MCV RBC AUTO: 99.7 FL (ref 78–100)
MONOCYTES # BLD: 0.7 K/UL (ref 0.05–1.2)
MONOCYTES NFR BLD: 5 % (ref 3–10)
NEUTS SEG # BLD: 10.9 K/UL (ref 1.8–8)
NEUTS SEG NFR BLD: 87 % (ref 40–73)
NRBC # BLD: 0 K/UL (ref 0–0.01)
NRBC BLD-RTO: 0 PER 100 WBC
PLATELET # BLD AUTO: 353 K/UL (ref 135–420)
PMV BLD AUTO: 10.2 FL (ref 9.2–11.8)
POTASSIUM SERPL-SCNC: 3.9 MMOL/L (ref 3.2–5.1)
PROT SERPL-MCNC: 6.9 G/DL (ref 6.1–8.4)
RBC # BLD AUTO: 3.65 M/UL (ref 4.2–5.3)
SARS-COV-2, COV2: NORMAL
SODIUM SERPL-SCNC: 138 MMOL/L (ref 135–145)
SPECIMEN SOURCE: NORMAL
WBC # BLD AUTO: 12.7 K/UL (ref 4.6–13.2)

## 2021-10-01 PROCEDURE — 99218 HC RM OBSERVATION: CPT

## 2021-10-01 PROCEDURE — 74011250636 HC RX REV CODE- 250/636: Performed by: EMERGENCY MEDICINE

## 2021-10-01 PROCEDURE — U0003 INFECTIOUS AGENT DETECTION BY NUCLEIC ACID (DNA OR RNA); SEVERE ACUTE RESPIRATORY SYNDROME CORONAVIRUS 2 (SARS-COV-2) (CORONAVIRUS DISEASE [COVID-19]), AMPLIFIED PROBE TECHNIQUE, MAKING USE OF HIGH THROUGHPUT TECHNOLOGIES AS DESCRIBED BY CMS-2020-01-R: HCPCS

## 2021-10-01 PROCEDURE — 71045 X-RAY EXAM CHEST 1 VIEW: CPT

## 2021-10-01 PROCEDURE — 74011000636 HC RX REV CODE- 636: Performed by: EMERGENCY MEDICINE

## 2021-10-01 PROCEDURE — 96374 THER/PROPH/DIAG INJ IV PUSH: CPT

## 2021-10-01 PROCEDURE — 97161 PT EVAL LOW COMPLEX 20 MIN: CPT

## 2021-10-01 PROCEDURE — 74011000258 HC RX REV CODE- 258: Performed by: EMERGENCY MEDICINE

## 2021-10-01 PROCEDURE — 74011250637 HC RX REV CODE- 250/637: Performed by: INTERNAL MEDICINE

## 2021-10-01 PROCEDURE — 74011250637 HC RX REV CODE- 250/637: Performed by: NURSE PRACTITIONER

## 2021-10-01 PROCEDURE — 65270000029 HC RM PRIVATE

## 2021-10-01 PROCEDURE — 87635 SARS-COV-2 COVID-19 AMP PRB: CPT

## 2021-10-01 PROCEDURE — 87324 CLOSTRIDIUM AG IA: CPT

## 2021-10-01 PROCEDURE — 94761 N-INVAS EAR/PLS OXIMETRY MLT: CPT

## 2021-10-01 PROCEDURE — 74011250636 HC RX REV CODE- 250/636: Performed by: NURSE PRACTITIONER

## 2021-10-01 PROCEDURE — 94640 AIRWAY INHALATION TREATMENT: CPT

## 2021-10-01 PROCEDURE — 77010033678 HC OXYGEN DAILY

## 2021-10-01 RX ORDER — ALBUTEROL SULFATE 90 UG/1
2 AEROSOL, METERED RESPIRATORY (INHALATION)
Status: DISCONTINUED | OUTPATIENT
Start: 2021-10-01 | End: 2021-10-06 | Stop reason: HOSPADM

## 2021-10-01 RX ORDER — ACETAMINOPHEN 650 MG/1
650 SUPPOSITORY RECTAL
Status: DISCONTINUED | OUTPATIENT
Start: 2021-10-01 | End: 2021-10-06 | Stop reason: HOSPADM

## 2021-10-01 RX ORDER — TAMSULOSIN HYDROCHLORIDE 0.4 MG/1
0.4 CAPSULE ORAL DAILY
Status: DISCONTINUED | OUTPATIENT
Start: 2021-10-01 | End: 2021-10-06 | Stop reason: HOSPADM

## 2021-10-01 RX ORDER — ENOXAPARIN SODIUM 100 MG/ML
40 INJECTION SUBCUTANEOUS DAILY
Status: DISCONTINUED | OUTPATIENT
Start: 2021-10-01 | End: 2021-10-01

## 2021-10-01 RX ORDER — ARFORMOTEROL TARTRATE 15 UG/2ML
15 SOLUTION RESPIRATORY (INHALATION)
Status: DISCONTINUED | OUTPATIENT
Start: 2021-10-01 | End: 2021-10-01

## 2021-10-01 RX ORDER — SODIUM CHLORIDE 0.9 % (FLUSH) 0.9 %
5-40 SYRINGE (ML) INJECTION EVERY 8 HOURS
Status: DISCONTINUED | OUTPATIENT
Start: 2021-10-01 | End: 2021-10-06 | Stop reason: HOSPADM

## 2021-10-01 RX ORDER — FUROSEMIDE 40 MG/1
40 TABLET ORAL DAILY
Status: DISCONTINUED | OUTPATIENT
Start: 2021-10-01 | End: 2021-10-06 | Stop reason: HOSPADM

## 2021-10-01 RX ORDER — FLUTICASONE PROPIONATE 50 MCG
2 SPRAY, SUSPENSION (ML) NASAL DAILY
Status: DISCONTINUED | OUTPATIENT
Start: 2021-10-01 | End: 2021-10-06 | Stop reason: HOSPADM

## 2021-10-01 RX ORDER — DICYCLOMINE HYDROCHLORIDE 10 MG/1
10 CAPSULE ORAL
Status: DISCONTINUED | OUTPATIENT
Start: 2021-10-01 | End: 2021-10-06 | Stop reason: HOSPADM

## 2021-10-01 RX ORDER — SODIUM CHLORIDE 9 MG/ML
100 INJECTION, SOLUTION INTRAVENOUS CONTINUOUS
Status: DISCONTINUED | OUTPATIENT
Start: 2021-10-01 | End: 2021-10-02

## 2021-10-01 RX ORDER — ONDANSETRON 4 MG/1
4 TABLET, ORALLY DISINTEGRATING ORAL
Status: DISCONTINUED | OUTPATIENT
Start: 2021-10-01 | End: 2021-10-06 | Stop reason: HOSPADM

## 2021-10-01 RX ORDER — BISMUTH SUBSALICYLATE 262 MG
1 TABLET,CHEWABLE ORAL DAILY
Status: DISCONTINUED | OUTPATIENT
Start: 2021-10-01 | End: 2021-10-04

## 2021-10-01 RX ORDER — ONDANSETRON 2 MG/ML
4 INJECTION INTRAMUSCULAR; INTRAVENOUS
Status: DISCONTINUED | OUTPATIENT
Start: 2021-10-01 | End: 2021-10-06 | Stop reason: HOSPADM

## 2021-10-01 RX ORDER — ACETAMINOPHEN 325 MG/1
650 TABLET ORAL
Status: DISCONTINUED | OUTPATIENT
Start: 2021-10-01 | End: 2021-10-06 | Stop reason: HOSPADM

## 2021-10-01 RX ORDER — FAMOTIDINE 20 MG/1
20 TABLET, FILM COATED ORAL 2 TIMES DAILY
Status: DISCONTINUED | OUTPATIENT
Start: 2021-10-01 | End: 2021-10-05

## 2021-10-01 RX ORDER — ATORVASTATIN CALCIUM 10 MG/1
10 TABLET, FILM COATED ORAL
Status: DISCONTINUED | OUTPATIENT
Start: 2021-10-01 | End: 2021-10-06 | Stop reason: HOSPADM

## 2021-10-01 RX ORDER — LISINOPRIL 20 MG/1
20 TABLET ORAL DAILY
Status: DISCONTINUED | OUTPATIENT
Start: 2021-10-01 | End: 2021-10-06 | Stop reason: HOSPADM

## 2021-10-01 RX ORDER — CARVEDILOL 3.12 MG/1
3.12 TABLET ORAL 2 TIMES DAILY WITH MEALS
Status: DISCONTINUED | OUTPATIENT
Start: 2021-10-01 | End: 2021-10-06 | Stop reason: HOSPADM

## 2021-10-01 RX ORDER — POLYETHYLENE GLYCOL 3350 17 G/17G
17 POWDER, FOR SOLUTION ORAL DAILY PRN
Status: DISCONTINUED | OUTPATIENT
Start: 2021-10-01 | End: 2021-10-06 | Stop reason: HOSPADM

## 2021-10-01 RX ORDER — BUDESONIDE AND FORMOTEROL FUMARATE DIHYDRATE 160; 4.5 UG/1; UG/1
2 AEROSOL RESPIRATORY (INHALATION)
Status: DISCONTINUED | OUTPATIENT
Start: 2021-10-01 | End: 2021-10-06 | Stop reason: HOSPADM

## 2021-10-01 RX ORDER — LANOLIN ALCOHOL/MO/W.PET/CERES
500 CREAM (GRAM) TOPICAL DAILY
Status: DISCONTINUED | OUTPATIENT
Start: 2021-10-01 | End: 2021-10-06 | Stop reason: HOSPADM

## 2021-10-01 RX ORDER — VANCOMYCIN HYDROCHLORIDE 250 MG/5ML
250 POWDER, FOR SOLUTION ORAL EVERY 6 HOURS
Status: DISCONTINUED | OUTPATIENT
Start: 2021-10-01 | End: 2021-10-06

## 2021-10-01 RX ORDER — LANOLIN ALCOHOL/MO/W.PET/CERES
1 CREAM (GRAM) TOPICAL
Status: DISCONTINUED | OUTPATIENT
Start: 2021-10-01 | End: 2021-10-01

## 2021-10-01 RX ORDER — DICYCLOMINE HYDROCHLORIDE 10 MG/1
10 CAPSULE ORAL
COMMUNITY

## 2021-10-01 RX ORDER — SODIUM CHLORIDE 0.9 % (FLUSH) 0.9 %
5-40 SYRINGE (ML) INJECTION AS NEEDED
Status: DISCONTINUED | OUTPATIENT
Start: 2021-10-01 | End: 2021-10-06 | Stop reason: HOSPADM

## 2021-10-01 RX ADMIN — TAMSULOSIN HYDROCHLORIDE 0.4 MG: 0.4 CAPSULE ORAL at 08:00

## 2021-10-01 RX ADMIN — Medication 10 ML: at 13:48

## 2021-10-01 RX ADMIN — IOPAMIDOL 94 ML: 755 INJECTION, SOLUTION INTRAVENOUS at 00:05

## 2021-10-01 RX ADMIN — VANCOMYCIN HYDROCHLORIDE 250 MG: KIT at 14:26

## 2021-10-01 RX ADMIN — VANCOMYCIN HYDROCHLORIDE 250 MG: KIT at 23:01

## 2021-10-01 RX ADMIN — SODIUM CHLORIDE 100 ML/HR: 9 INJECTION, SOLUTION INTRAVENOUS at 17:42

## 2021-10-01 RX ADMIN — CARVEDILOL 3.12 MG: 3.12 TABLET, FILM COATED ORAL at 08:00

## 2021-10-01 RX ADMIN — APIXABAN 2.5 MG: 2.5 TABLET, FILM COATED ORAL at 08:00

## 2021-10-01 RX ADMIN — PIPERACILLIN AND TAZOBACTAM 3.38 G: 3; .375 INJECTION, POWDER, LYOPHILIZED, FOR SOLUTION INTRAVENOUS at 02:49

## 2021-10-01 RX ADMIN — CYANOCOBALAMIN TAB 500 MCG 500 MCG: 500 TAB at 08:00

## 2021-10-01 RX ADMIN — ATORVASTATIN CALCIUM 10 MG: 10 TABLET, FILM COATED ORAL at 22:57

## 2021-10-01 RX ADMIN — CARVEDILOL 3.12 MG: 3.12 TABLET, FILM COATED ORAL at 17:41

## 2021-10-01 RX ADMIN — Medication 10 ML: at 09:31

## 2021-10-01 RX ADMIN — Medication 10 ML: at 22:57

## 2021-10-01 RX ADMIN — DICYCLOMINE HYDROCHLORIDE 10 MG: 10 CAPSULE ORAL at 08:00

## 2021-10-01 RX ADMIN — ACETAMINOPHEN 650 MG: 325 TABLET ORAL at 08:00

## 2021-10-01 RX ADMIN — MORPHINE SULFATE 1 MG: 2 INJECTION, SOLUTION INTRAMUSCULAR; INTRAVENOUS at 00:26

## 2021-10-01 RX ADMIN — BUDESONIDE AND FORMOTEROL FUMARATE DIHYDRATE 2 PUFF: 160; 4.5 AEROSOL RESPIRATORY (INHALATION) at 20:04

## 2021-10-01 RX ADMIN — FLUTICASONE PROPIONATE 2 SPRAY: 50 SPRAY, METERED NASAL at 11:36

## 2021-10-01 RX ADMIN — SODIUM CHLORIDE 1000 ML: 9 INJECTION, SOLUTION INTRAVENOUS at 02:26

## 2021-10-01 RX ADMIN — VANCOMYCIN HYDROCHLORIDE 250 MG: KIT at 17:41

## 2021-10-01 RX ADMIN — DICYCLOMINE HYDROCHLORIDE 10 MG: 10 CAPSULE ORAL at 23:01

## 2021-10-01 RX ADMIN — FAMOTIDINE 20 MG: 20 TABLET ORAL at 17:41

## 2021-10-01 RX ADMIN — FUROSEMIDE 40 MG: 40 TABLET ORAL at 08:00

## 2021-10-01 RX ADMIN — LISINOPRIL 20 MG: 20 TABLET ORAL at 08:00

## 2021-10-01 RX ADMIN — APIXABAN 2.5 MG: 2.5 TABLET, FILM COATED ORAL at 17:41

## 2021-10-01 RX ADMIN — FAMOTIDINE 20 MG: 20 TABLET ORAL at 08:00

## 2021-10-01 NOTE — ED NOTES
TRANSFER - OUT REPORT:    Verbal report given to Charlotte(name) on Yassine Castro  being transferred to [address] [place] [address](unit) for routine progression of care       Report consisted of patients Situation, Background, Assessment and   Recommendations(SBAR). Information from the following report(s) ED Summary was reviewed with the receiving nurse. Lines:   Peripheral IV 09/30/21 Left;Posterior Hand (Active)   Site Assessment Clean, dry, & intact 09/30/21 2200   Phlebitis Assessment 0 09/30/21 2200   Dressing Status Clean, dry, & intact 09/30/21 2200   Dressing Type Transparent 09/30/21 2200   Hub Color/Line Status Blue 09/30/21 2200        Opportunity for questions and clarification was provided.       Patient transported with:   Monitor  O2 @ 2 liters  Patient's medications from home  Tech

## 2021-10-01 NOTE — ED NOTES
Daughter, Naomi Calderon, [phone], did not leave password but states she has power of  and that pt wishes \"no heroic measures\". DNR paperwork not provided by family.

## 2021-10-01 NOTE — PROGRESS NOTES
Problem: Mobility Impaired (Adult and Pediatric)  Goal: *Acute Goals and Plan of Care (Insert Text)  Description: Physical Therapy Goals  Initiated 10/1/2021 and to be accomplished within 7 day(s)  1. Patient will move from supine to sit and sit to supine , scoot up and down, and roll side to side in bed with minimal assistance/contact guard assist.    2.  Patient will transfer from bed to chair and chair to bed with minimal assistance/contact guard assist using the least restrictive device. 3.  Patient will perform sit to stand with minimal assistance/contact guard assist.  4.  Patient will ambulate with minimal assistance/contact guard assist for 50 feet with the least restrictive device. 5.  Patient will ascend/descend 3 stairs with 2 handrail(s) with minimal assistance/contact guard assist.    PLOF: Limited community ambulator who used a RW or cane. She was (I) with ADLs. Outcome: Progressing Towards Goal   PHYSICAL THERAPY EVALUATION    Patient: Mikell Simmonds (3 y.o. female)  Date: 10/1/2021  Primary Diagnosis: Diverticulitis [K57.92]  COPD (chronic obstructive pulmonary disease) ([facility] [address] [J44.9]  Dehydration [E86.0]  Abdominal pain [R10.9]        Precautions: Other (comment) (Monitor SpO2 )    ASSESSMENT :  Based on the objective data described below, the patient presents with COPD and diverticulitis. She is having severe pain this morning and her stomach remains upset. She does not want to attempt getting up at this time. A RW was placed in the room in case she wants to get up later with nursing. Patient would benefit from further P.T. to improve strength, gait, and stair navigation. They would likely benefit from SNF or HH once medically stable, depending on progress. Patient will benefit from skilled intervention to address the above impairments.   Patient's rehabilitation potential is considered to be Good  Factors which may influence rehabilitation potential include:   []         None noted  []         Mental ability/status  [x]         Medical condition  []         Home/family situation and support systems  []         Safety awareness  []         Pain tolerance/management  []         Other:      PLAN :  Recommendations and Planned Interventions:   [x]           Bed Mobility Training             []    Neuromuscular Re-Education  [x]           Transfer Training                   []    Orthotic/Prosthetic Training  [x]           Gait Training                          []    Modalities  [x]           Therapeutic Exercises           []    Edema Management/Control  [x]           Therapeutic Activities            []    Family Training/Education  [x]           Patient Education  []           Other (comment):    Frequency/Duration: Patient will be followed by physical therapy 1-2 times per day/4-7 days per week to address goals. Discharge Recommendations: Home Health, Home Physical Therapy, or Skilled Nursing Facility  Further Equipment Recommendations for Discharge: N/A     SUBJECTIVE:   Patient stated i'm hurting all over.     OBJECTIVE DATA SUMMARY:     Past Medical History:   Diagnosis Date    Arthritis     Asthma     CAD (coronary artery disease)     Heart disease     High cholesterol     Hyperlipidemia      Past Surgical History:   Procedure Laterality Date    HX APPENDECTOMY      HX COLONOSCOPY  01/26/2015    per gi no further colonoscopy needed due to age of the patient, see note 1/26/15    HX HERNIA REPAIR Left 2001    inguinal    HX HYSTERECTOMY      NC CARDIAC SURG PROCEDURE UNLIST  2007    defibrillator placed     Barriers to Learning/Limitations: None  Compensate with: N/A  Home Situation:  Home Situation  Home Environment: Private residence  # Steps to Enter: 3  Rails to Enter: Yes  Hand Rails : Bilateral  One/Two Story Residence: One story  Living Alone: No  Support Systems: Other Family Member(s), Child(reid)  Patient Expects to be Discharged to[de-identified] Unknown  Current DME Used/Available at Home: Walker, rolling, Oxygen, portable  Critical Behavior:  Neurologic State: Alert  Orientation Level: Oriented X4  Strength:    Strength: Generally decreased, functional  RUE: 3+/5  LUE: 3+/5  RLE: 3+/5  LLE: 3+/5  Tone & Sensation:   Sensation: Intact  Range Of Motion:   AROM: Within functional limits  Posture:   WFL     Functional Mobility:  Bed Mobility:  Rolling: Other (comment) (Pt refused due to pain and not feeling well)  Pain:  Pain level pre-treatment: 8-9/10   Pain level post-treatment: 8-9/10   Pain Location: All over  Pain Intervention(s) : Medication (see MAR); Rest, Ice, Repositioning  Response to intervention: Nurse notified, See doc flow    Activity Tolerance:   Poor, pt currently on 3L  Please refer to the flowsheet for vital signs taken during this treatment. After treatment:   []         Patient left in no apparent distress sitting up in chair  [x]         Patient left in no apparent distress in bed  [x]         Call bell left within reach  [x]         Nursing notified  []         Caregiver present  []         Bed alarm activated  []         SCDs applied    COMMUNICATION/EDUCATION:   [x]         Role of Physical Therapy in the acute care setting. [x]         Fall prevention education was provided and the patient/caregiver indicated understanding. [x]         Patient/family have participated as able in goal setting and plan of care. [x]         Patient/family agree to work toward stated goals and plan of care. []         Patient understands intent and goals of therapy, but is neutral about his/her participation. []         Patient is unable to participate in goal setting/plan of care: ongoing with therapy staff.  []         Other:     Thank you for this referral.  Eric Pardo, PT, DPT   Time Calculation: 10 mins

## 2021-10-01 NOTE — PROGRESS NOTES
Comprehensive Nutrition Assessment    Type and Reason for Visit: Consult    Nutrition Recommendations/Plan: currently full liquids diet due to diveriticulosis and +ciff  Provide ensure plus TID until diet can be advanced  When diet can be advanced recommend regular diet to encourage PO intake. Nutrition Assessment:  81 yo female PMH: arthritis, asthma, high cholesterol, HLD, chronic lung mass. poor intake prior to admission, vague abdominal pain found to have diverticulosis and tested + C-diff started on vancomycin. Emaciated frame nutrition consult for physical deconditioning albumin 2.9 starting ensure plus TID as pt currently on full liquids diet. Pt also chronic lung mass hx daughter did not want to treat per MD note. Recent Results (from the past 24 hour(s))   CBC WITH AUTOMATED DIFF    Collection Time: 09/30/21 11:00 PM   Result Value Ref Range    WBC 12.7 4.6 - 13.2 K/uL    RBC 3.65 (L) 4.20 - 5.30 M/uL    HGB 11.5 (L) 12.0 - 16.0 g/dL    HCT 36.4 35.0 - 45.0 %    MCV 99.7 78.0 - 100.0 FL    MCH 31.5 24.0 - 34.0 PG    MCHC 31.6 31.0 - 37.0 g/dL    RDW 15.9 (H) 11.6 - 14.5 %    PLATELET 181 061 - 872 K/uL    MPV 10.2 9.2 - 11.8 FL    NRBC 0.0 0.0  WBC    ABSOLUTE NRBC 0.00 0.00 - 0.01 K/uL    NEUTROPHILS 87 (H) 40 - 73 %    LYMPHOCYTES 8 (L) 21 - 52 %    MONOCYTES 5 3 - 10 %    EOSINOPHILS 0 0 - 5 %    BASOPHILS 0 0 - 2 %    IMMATURE GRANULOCYTES 0 0 - 0.5 %    ABS. NEUTROPHILS 10.9 (H) 1.8 - 8.0 K/UL    ABS. LYMPHOCYTES 1.0 0.9 - 3.6 K/UL    ABS. MONOCYTES 0.7 0.05 - 1.2 K/UL    ABS. EOSINOPHILS 0.0 0.0 - 0.4 K/UL    ABS. BASOPHILS 0.0 0.0 - 0.1 K/UL    ABS. IMM.  GRANS. 0.1 (H) 0.00 - 0.04 K/UL    DF AUTOMATED     METABOLIC PANEL, COMPREHENSIVE    Collection Time: 09/30/21 11:00 PM   Result Value Ref Range    Sodium 138 135 - 145 mmol/L    Potassium 3.9 3.2 - 5.1 mmol/L    Chloride 104 94 - 111 mmol/L    CO2 25 21 - 33 mmol/L    Anion gap 9 mmol/L    Glucose 113 (H) 70 - 110 mg/dL    BUN 10 9 - 21 mg/dL    Creatinine 0.50 (L) 0.70 - 1.20 mg/dL    BUN/Creatinine ratio 20      GFR est AA >60 ml/min/1.73m2    GFR est non-AA >60 ml/min/1.73m2    Calcium 8.6 8.5 - 10.5 mg/dL    Bilirubin, total 0.8 0.2 - 1.0 mg/dL    AST (SGOT) 25 14 - 74 U/L    ALT (SGPT) 15 3 - 52 U/L    Alk. phosphatase 41 38 - 126 U/L    Protein, total 6.9 6.1 - 8.4 g/dL    Albumin 2.9 (L) 3.5 - 4.7 g/dL    Globulin 4.0 g/dL    A-G Ratio 0.7     LACTIC ACID    Collection Time: 09/30/21 11:00 PM   Result Value Ref Range    Lactic acid 1.0 0.5 - 2.0 mmol/L   LIPASE    Collection Time: 09/30/21 11:00 PM   Result Value Ref Range    Lipase 23 10 - 57 U/L   AMYLASE    Collection Time: 09/30/21 11:00 PM   Result Value Ref Range    Amylase 29 (L) 30 - 100 U/L   COVID-19 RAPID TEST    Collection Time: 10/01/21  2:55 AM   Result Value Ref Range    Specimen source Nasopharynx      COVID-19 rapid test Not Detected Not Detected     SARS-COV-2    Collection Time: 10/01/21 10:00 AM   Result Value Ref Range    SARS-CoV-2 Please find results under separate order     C.  DIFFICILE AG & TOXIN A/B    Collection Time: 10/01/21 10:15 AM   Result Value Ref Range    G[facility] ANTIGEN Positive (A) Negative      C. difficile toxin Positive (A) Negative      INTERPRETATION Positive for toxigenic C. difficile (A) Negative for toxigenic C. difficile         Malnutrition Assessment:  Malnutrition Status:  Mild malnutrition (C-diff isolation)    Context:  Acute illness     Findings of the 6 clinical characteristics of malnutrition:   Energy Intake:  7 - 50% or less of est energy requirements for 5 or more days  Weight Loss:  Unable to assess     Body Fat Loss:  Unable to assess,     Muscle Mass Loss:  Unable to assess,    Fluid Accumulation:  No significant fluid accumulation,     Strength:  Not performed         Estimated Daily Nutrient Needs:  Energy (kcal): 3581-5147 kcal/day; Weight Used for Energy Requirements: Admission (54 kg)  Protein (g): 53-64 g/day; Weight Used for Protein Requirements: Admission (1-1.2 g/kg)  Fluid (ml/day): 1628-6082 mL/day; Method Used for Fluid Requirements: 1 ml/kcal      Nutrition Related Findings:  poor intake prior to admission, vague abdominal pain found to have diverticulosis and tested + C-diff started on vancomycin. Emaciated frame consult for physical deconditioning. Pt also chronic lung mass hx daughter did not want to treat per MD note      Wounds:    None       Current Nutrition Therapies:  ADULT DIET Full Liquid    Anthropometric Measures:  · Height:  5' 3\" (160 cm)  · Current Body Wt:  53.1 kg (117 lb)   · Admission Body Wt:  117 lb    · Usual Body Wt:        · Ideal Body Wt:  115 lbs:  101.7 %   · Adjusted Body Weight:   ; Weight Adjustment for: No adjustment   · Adjusted BMI:       · BMI Category:  Underweight (BMI less than 22) age over 72       Nutrition Diagnosis:   · Severe malnutrition, Increased nutrient needs related to catabolic illness, inadequate protein-energy intake, altered GI function as evidenced by diarrhea, BMI, poor intake prior to admission      Nutrition Interventions:   Food and/or Nutrient Delivery: Continue current diet  Nutrition Education and Counseling: Education not appropriate  Coordination of Nutrition Care: Continue to monitor while inpatient    Goals:  Pt to eat > 75% of meals, BM q 1-3 days, BMI 22-25 for adults > 73 yo       Nutrition Monitoring and Evaluation:   Behavioral-Environmental Outcomes:    Food/Nutrient Intake Outcomes: Food and nutrient intake, Supplement intake  Physical Signs/Symptoms Outcomes: Meal time behavior, Weight, GI status, Diarrhea     F/U: 10/4/2021    Discharge Planning:     Too soon to determine     Electronically signed by Mitchell Sanderson on 10/1/2021 at 3:51 PM    Contact: SUZAN [phone]

## 2021-10-01 NOTE — H&P
History and Physical    Subjective:     Inna Booker is a 80 y.o. female  has a past medical history of Arthritis, Asthma, CAD (coronary artery disease), Heart disease, High cholesterol, and Hyperlipidemia. Patient seen at bedside in emergency department. Patient was in a long-term care facility and was discharged 2 days ago was home lives with a friend next to her daughter. Patient complaining of vague abdominal pain at this time no nausea vomiting. States that she her abdominal pain increased and she came to the emergency room this evening. Patient was treated for some dehydration diverticulitis was found on CT this evening. Zosyn was started in the emergency room Zosyn will be continued and patient. Patient will be continued on IV fluids. Gastrointestinal consultation. ER doctor reported to me that he had a conversation with the patient's daughter that she wanted her DNR/DNI and would consider comfort measures but not at this time. Past Medical History:   Diagnosis Date    Arthritis     Asthma     CAD (coronary artery disease)     Heart disease     High cholesterol     Hyperlipidemia       Past Surgical History:   Procedure Laterality Date    HX APPENDECTOMY      HX COLONOSCOPY  01/26/2015    per gi no further colonoscopy needed due to age of the patient, see note 1/26/15    HX HERNIA REPAIR Left 2001    inguinal    HX HYSTERECTOMY      ME CARDIAC SURG PROCEDURE UNLIST  2007    defibrillator placed     Family History   Problem Relation Age of Onset    Emphysema Mother       Social History     Tobacco Use    Smoking status: Former Smoker    Smokeless tobacco: Never Used   Substance Use Topics    Alcohol use: Not Currently       Prior to Admission medications    Medication Sig Start Date End Date Taking? Authorizing Provider   dicyclomine (BENTYL) 10 mg capsule Take 10 mg by mouth every six (6) hours as needed for Abdominal Cramps.    Yes Other, MD Brigida   promethazine (PHENERGAN) 25 mg tablet Take 1 Tab by mouth every twelve (12) hours as needed for Nausea. 4/19/21  Yes Prisca Comment., MD   methocarbamoL (ROBAXIN) 500 mg tablet Take 1 Tab by mouth four (4) times daily. 1/25/21  Yes Prisca Comment., MD   apixaban (ELIQUIS) 2.5 mg tablet Take 2.5 mg by mouth two (2) times a day. 6/3/20  Yes Provider, Historical   calcium citrate-vitamin d3 (CITRACAL+D) 315 mg-5 mcg (200 unit) tab Take 1 Tab by mouth daily (with breakfast). Yes Provider, Historical   Omega-3 Fatty Acids 60- mg cpDR Take  by mouth. Yes Provider, Historical   carvediloL (COREG) 6.25 mg tablet Take 3.125 mg by mouth two (2) times daily (with meals). Yes Provider, Historical   tamsulosin (Flomax) 0.4 mg capsule Take 0.4 mg by mouth daily. Provider, Historical   albuterol (PROVENTIL HFA, VENTOLIN HFA, PROAIR HFA) 90 mcg/actuation inhaler Take 2 Puffs by inhalation every six (6) hours as needed for Wheezing. Indications: chronic obstructive pulmonary disease 5/11/21   Prisca Comment., MD   lidocaine (LIDODERM) 5 % Apply patch to the affected area for 12 hours a day and remove for 12 hours a day. 4/19/21   Prisca Comment., MD   lisinopriL (PRINIVIL, ZESTRIL) 20 mg tablet Take 20 mg by mouth daily. Provider, Historical   famotidine (PEPCID) 20 mg tablet Take 20 mg by mouth two (2) times a day. Provider, Historical   Linzess 72 mcg cap capsule Take 1 capsule by mouth once daily for 90 days 3/2/21   Adam Carrera MD   arformoteroL [facility]) 15 mcg/2 mL nebu neb solution 15 mcg by Nebulization route. Provider, Historical   cetirizine (ZYRTEC) 5 mg tablet Take 5 mg by mouth daily. Provider, Historical   fluticasone propionate (FLONASE ALLERGY RELIEF NA) 1 [place] by Nasal route. Provider, Historical   levocetirizine (XYZAL) 5 mg tablet Take 5 mg by mouth daily.     Provider, Historical   Oxygen 2 Liters at night    Provider, Historical   simvastatin (ZOCOR) 20 mg tablet Take 20 mg by mouth nightly. 6/3/20   Provider, Historical   cyanocobalamin (VITAMIN B12) 500 mcg tablet Take 500 mcg by mouth daily. Provider, Historical   multivitamin (ONE A DAY) tablet Take 1 Tab by mouth daily. Provider, Historical   polyethylene glycol (MIRALAX) 17 gram/dose powder Take 17 g by mouth daily. Provider, Historical   furosemide (LASIX) 20 mg tablet Take 40 mg by mouth daily. Provider, Historical     Allergies   Allergen Reactions    Codeine Nausea and Vomiting         Review of Systems   Constitutional: Positive for malaise/fatigue. Negative for fever. Gastrointestinal: Positive for abdominal pain. Negative for blood in stool, constipation, diarrhea, nausea and vomiting. Neurological: Positive for weakness. Negative for dizziness and focal weakness. All other systems reviewed and are negative. Objective:   VITALS:    Visit Vitals  /64   Pulse 75   Temp 98.4 °F (36.9 °C)   Resp 20   Ht 5' 3\" (1.6 m)   Wt 53.3 kg (117 lb 9.6 oz)   SpO2 97%   BMI 20.83 kg/m²       Physical Exam  Vitals and nursing note reviewed. Constitutional:       General: She is not in acute distress. Appearance: She is well-developed and underweight. She is ill-appearing. She is not toxic-appearing or diaphoretic. HENT:      Head: Normocephalic and atraumatic. Eyes:      Conjunctiva/sclera: Conjunctivae normal.      Pupils: Pupils are equal, round, and reactive to light. Cardiovascular:      Rate and Rhythm: Normal rate and regular rhythm. Pulses: Normal pulses. Pulmonary:      Effort: Pulmonary effort is normal. No respiratory distress. Breath sounds: No stridor. Wheezing present. Comments: Very mild wheeze consistent with chronic COPD  Abdominal:      General: Bowel sounds are normal. There is no distension. Palpations: Abdomen is soft. Tenderness: There is abdominal tenderness. There is no guarding.       Comments: Mild generalized tenderness abdomen is soft and nonacute   Musculoskeletal:         General: Normal range of motion. Cervical back: Normal range of motion and neck supple. Skin:     General: Skin is warm and dry. Neurological:      Mental Status: She is alert and oriented to person, place, and time. GCS: GCS eye subscore is 4. GCS verbal subscore is 5. GCS motor subscore is 6. Motor: Motor function is intact. Deep Tendon Reflexes: Reflexes are normal and symmetric. Comments: Patient underweight awake alert and oriented generalized weakness is reported and noted on exam no focal weakness   Psychiatric:         Attention and Perception: Attention normal.         Mood and Affect: Mood normal.         Speech: Speech normal.         Behavior: Behavior normal.         Thought Content:  Thought content normal.         Judgment: Judgment normal.         _______________________________________________________________________  Care Plan discussed with:    Comments   Patient X    Family      RN X    Care Manager                    Consultant:      _______________________________________________________________________  Expected  Disposition:   Home with Family     HH/PT/OT/RN    SNF/LTC x   TAMRA    ________________________________________________________________________  TOTAL TIME:  48 Minutes    Critical Care Provided     Minutes non procedure based      Comments    X Reviewed previous records   >50% of visit spent in counseling and coordination of care X Discussion with patient and/or family and questions answered       ________________________________________________________________________    Labs:  Recent Results (from the past 24 hour(s))   CBC WITH AUTOMATED DIFF    Collection Time: 09/30/21 11:00 PM   Result Value Ref Range    WBC 12.7 4.6 - 13.2 K/uL    RBC 3.65 (L) 4.20 - 5.30 M/uL    HGB 11.5 (L) 12.0 - 16.0 g/dL    HCT 36.4 35.0 - 45.0 %    MCV 99.7 78.0 - 100.0 FL    MCH 31.5 24.0 - 34.0 PG    MCHC 31.6 31.0 - 37.0 g/dL    RDW 15.9 (H) 11.6 - 14.5 %    PLATELET 925 051 - 702 K/uL    MPV 10.2 9.2 - 11.8 FL    NRBC 0.0 0.0  WBC    ABSOLUTE NRBC 0.00 0.00 - 0.01 K/uL    NEUTROPHILS 87 (H) 40 - 73 %    LYMPHOCYTES 8 (L) 21 - 52 %    MONOCYTES 5 3 - 10 %    EOSINOPHILS 0 0 - 5 %    BASOPHILS 0 0 - 2 %    IMMATURE GRANULOCYTES 0 0 - 0.5 %    ABS. NEUTROPHILS 10.9 (H) 1.8 - 8.0 K/UL    ABS. LYMPHOCYTES 1.0 0.9 - 3.6 K/UL    ABS. MONOCYTES 0.7 0.05 - 1.2 K/UL    ABS. EOSINOPHILS 0.0 0.0 - 0.4 K/UL    ABS. BASOPHILS 0.0 0.0 - 0.1 K/UL    ABS. IMM. GRANS. 0.1 (H) 0.00 - 0.04 K/UL    DF AUTOMATED     METABOLIC PANEL, COMPREHENSIVE    Collection Time: 09/30/21 11:00 PM   Result Value Ref Range    Sodium 138 135 - 145 mmol/L    Potassium 3.9 3.2 - 5.1 mmol/L    Chloride 104 94 - 111 mmol/L    CO2 25 21 - 33 mmol/L    Anion gap 9 mmol/L    Glucose 113 (H) 70 - 110 mg/dL    BUN 10 9 - 21 mg/dL    Creatinine 0.50 (L) 0.70 - 1.20 mg/dL    BUN/Creatinine ratio 20      GFR est AA >60 ml/min/1.73m2    GFR est non-AA >60 ml/min/1.73m2    Calcium 8.6 8.5 - 10.5 mg/dL    Bilirubin, total 0.8 0.2 - 1.0 mg/dL    AST (SGOT) 25 14 - 74 U/L    ALT (SGPT) 15 3 - 52 U/L    Alk. phosphatase 41 38 - 126 U/L    Protein, total 6.9 6.1 - 8.4 g/dL    Albumin 2.9 (L) 3.5 - 4.7 g/dL    Globulin 4.0 g/dL    A-G Ratio 0.7     LACTIC ACID    Collection Time: 09/30/21 11:00 PM   Result Value Ref Range    Lactic acid 1.0 0.5 - 2.0 mmol/L   LIPASE    Collection Time: 09/30/21 11:00 PM   Result Value Ref Range    Lipase 23 10 - 57 U/L   AMYLASE    Collection Time: 09/30/21 11:00 PM   Result Value Ref Range    Amylase 29 (L) 30 - 100 U/L   COVID-19 RAPID TEST    Collection Time: 10/01/21  2:55 AM   Result Value Ref Range    Specimen source Nasopharynx      COVID-19 rapid test Not Detected Not Detected         Imaging:  CT ABD PELV W CONT    Result Date: 10/1/2021  EXAM: CT of the abdomen and pelvis INDICATION: Pain. COMPARISON: September 11, 2021.  TECHNIQUE: Axial CT imaging of the abdomen and pelvis was performed with intravenous contrast. Multiplanar reformats were generated. Dose reduction techniques used: automated exposure control, adjustment of the mAs and/or kVp according to patient size, and iterative reconstruction techniques. Digital imaging and communications in Medicine (DICOM) format image data are available to nonaffiliated external healthcare facilities or entities on a secure, media free, reciprocally searchable basis with patient authorization for at least 12 months after this study. _______________ FINDINGS: LOWER CHEST: There are small bilateral pleural effusions larger on the right. There is patchy consolidation at the right lung base. There is a small pericardial effusion. The visualized ascending aorta appears to be ectatic. LIVER, BILIARY: Liver is normal. No biliary dilation. Gallbladder is unremarkable. PANCREAS: Normal. SPLEEN: Normal. ADRENALS: Normal. KIDNEYS: There are a few small left renal cysts. There is no hydronephrosis. LYMPH NODES: No enlarged lymph nodes. GASTROINTESTINAL TRACT: There is diverticulosis of the descending and sigmoid colon. There is a long segment of thickened sigmoid colon with a few areas of surrounding infiltration. PELVIC ORGANS: Unremarkable. VASCULATURE: Unremarkable. BONES: There is lumbosacral degenerative change with curvature to the right. There is a chronic T12 compression fracture OTHER: None. _______________     Descending and sigmoid colon diverticulosis with sigmoid colonic thickening and a few areas of surrounding infiltration. Consider mild acute diverticulitis superimposed on a chronic diverticular process. Small bilateral pleural effusions. Patchy consolidation at the right lung base possibly residual pneumonia as an infiltrate was seen on previous study. Small pericardial effusion.         Assessment & Plan:       COPD (chronic obstructive pulmonary disease) ([facility] [address]  This is a chronic problem  No acute exacerbation noted  Continue Brovana and Proventil    Dehydration  Poor p.o. intake per daughter  Patient very thin emaciated  Rehydrate at 100 mL/h normal saline    Diverticulitis  This is a recurrent problem  CT scan tonight showed mild diverticulitis  GI consultation  Continue Zosyn started in ED    Essential hypertension  This is a chronic problem  Continue Coreg, Lasix, lisinopril    Lung mass  This is a chronic problem  Patient and daughter do not want any advanced measures for this lung mass considered malignant      Paroxysmal atrial fibrillation ([address]  This is a chronic problem  Patient on Eliquis  Continue Coreg  Patient has an AICD    Physical deconditioning  This is a recurrent problem from prior admission  Case management consultation for discharge planning  Physical therapy consult        Code Status: DNR, DNI      Prophylaxis:  Lovenox 40 mg subcu daily         Electronically Signed : Dayanna Owen ENP-C, FNP-C, [address] voice recognition was used to generate this report, which may have resulted in some phonetic based errors in grammar and contents.  Even though attempts were made to correct all the mistakes, some may have been missed, and remained in the body of the document

## 2021-10-01 NOTE — ED TRIAGE NOTES
Patient brought in by [address] ( [address] ambulance ) for abdominal pain. Patient recently discharged yesterday from [address] [place] [address] after being treated for diverticulitis at [facility]. Patient has severe copd is 02 dependent on 2 L/ NC patient has DNR in place with family bringing form to us.

## 2021-10-01 NOTE — PROGRESS NOTES
Pt is appropriate for observation, this is not diverticulitis, this is diverticulosis and healing diverticulitis. She just had colonoscopy done last week, no signs of diverticulitis at that time. Dc antibiotics.

## 2021-10-01 NOTE — PROGRESS NOTES
Received pt via stretcher. Breath sounds w/ expiratory wheezing. Bowel sounds positive. PP positive. Pt has O2 2lpm via NC, no distress noted. Pt has 22g IV access to the left arm; not patent upon inspection and flush. Attempted x1 to obtain new site; not successful.

## 2021-10-01 NOTE — ASSESSMENT & PLAN NOTE
This is a recurrent problem from prior admission  Case management consultation for discharge planning  Physical therapy consult

## 2021-10-01 NOTE — PROGRESS NOTES
Reason for Admission:  Chart reviewed and noted patient presented with C/O vague abdominal pain, no N&V. Pt states her abdominal pain increased so she came to the ER. Pt was treated for some dehydration and diverticulitis which was found on CT. Dx: COPD, Diverticulitis     PMH: Arthritis, Asthma, CAD, Heart Disease, High Cholesterol, HLD                     RUR Score: 16%                    Plan for utilizing home health: TBD        PCP: First and Last name:  Anel Mejia MD     Name of Practice:    Are you a current patient: Yes/No:    Approximate date of last visit:    Can you participate in a virtual visit with your PCP:                     Current Advanced Directive/Advance Care Plan: DNR- No ACP      Healthcare Decision Maker: Diane Stephens   Click here to complete [place] including selection of the Healthcare Decision Maker Relationship (ie \"Primary\")                             Transition of Care Plan: TBD    Care Management Interventions  PCP Verified by CM: Yes  Transition of Care Consult (CM Consult):  Discharge Planning  Current Support Network: Daughter & POA- Diane Stephens- [phone]. Patient has a friend that lives with her. She has a cane, rollator and Home 02 @ 2 LPM via NC. Patient's daughter is thinking about her mom having Hospice at Home or Hospice at a Facility. She will be talking to her family and getting back with me. CM to follow.

## 2021-10-01 NOTE — ED PROVIDER NOTES
EMERGENCY DEPARTMENT HISTORY AND PHYSICAL EXAM      Date: 9/30/2021  Patient Name: Stephen Corey      History of Presenting Illness     Chief Complaint   Patient presents with    Abdominal Pain       History Provided By: Patient and Patient's Daughter    HPI: Stephen Corey, 80 y.o. female with a past medical history significant COPD and Diverticulitis, Lung mass presents to the ED with cc of Abdominal pain, cannot eat, weakness, fatigue, SOB worse in last 2 days. States she has been ill for a month. Was admitted for diverticulitis and noted to have a lung mass. Patient states she was discharged from rehab facility 2 days ago but the transportation was very stressfull and tedious. She states her abdominal pain and SOB got worse during the transport and she has been extremely fatigued at home. She lives with a  friend next door to her daughter who checks up on her. Patient describes her abdominal pain as dull, diffuse and non radiating. No aggravating or alleviating factors. There are no other complaints, changes, or physical findings at this time. PCP: Julissa Ladd MD    Current Facility-Administered Medications   Medication Dose Route Frequency Provider Last Rate Last Admin    sodium chloride 0.9 % bolus infusion 1,000 mL  1,000 mL IntraVENous ONCE Clifton Romeo MD 1,000 mL/hr at 10/01/21 0226 1,000 mL at 10/01/21 0226    piperacillin-tazobactam (ZOSYN) 3.375 g in 0.9% sodium chloride (MBP/ADV) 100 mL MBP  3.375 g IntraVENous NOW Clifton Romeo MD        albuterol (PROVENTIL HFA, VENTOLIN HFA, PROAIR HFA) inhaler 2 Puff  2 Puff Inhalation Q6H PRN Julissa Ladd MD         Current Outpatient Medications   Medication Sig Dispense Refill    dicyclomine (BENTYL) 10 mg capsule Take 10 mg by mouth every six (6) hours as needed for Abdominal Cramps.  promethazine (PHENERGAN) 25 mg tablet Take 1 Tab by mouth every twelve (12) hours as needed for Nausea.  60 Tab 3    methocarbamoL (ROBAXIN) 500 mg tablet Take 1 Tab by mouth four (4) times daily. 360 Tab 3    apixaban (ELIQUIS) 2.5 mg tablet Take 2.5 mg by mouth two (2) times a day.  calcium citrate-vitamin d3 (CITRACAL+D) 315 mg-5 mcg (200 unit) tab Take 1 Tab by mouth daily (with breakfast).  Omega-3 Fatty Acids 60- mg cpDR Take  by mouth.  carvediloL (COREG) 6.25 mg tablet Take 3.125 mg by mouth two (2) times daily (with meals).  tamsulosin (Flomax) 0.4 mg capsule Take 0.4 mg by mouth daily.  albuterol (PROVENTIL HFA, VENTOLIN HFA, PROAIR HFA) 90 mcg/actuation inhaler Take 2 Puffs by inhalation every six (6) hours as needed for Wheezing. Indications: chronic obstructive pulmonary disease 1 Inhaler 3    lidocaine (LIDODERM) 5 % Apply patch to the affected area for 12 hours a day and remove for 12 hours a day. 30 Each 2    lisinopriL (PRINIVIL, ZESTRIL) 20 mg tablet Take 20 mg by mouth daily.  famotidine (PEPCID) 20 mg tablet Take 20 mg by mouth two (2) times a day.  Linzess 72 mcg cap capsule Take 1 capsule by mouth once daily for 90 days 90 Cap 0    arformoteroL (BROVANA) 15 mcg/2 mL nebu neb solution 15 mcg by Nebulization route.  cetirizine (ZYRTEC) 5 mg tablet Take 5 mg by mouth daily.  fluticasone propionate (FLONASE ALLERGY RELIEF NA) 1 [place] by Nasal route.  levocetirizine (XYZAL) 5 mg tablet Take 5 mg by mouth daily.  Oxygen 2 Liters at night      simvastatin (ZOCOR) 20 mg tablet Take 20 mg by mouth nightly.  cyanocobalamin (VITAMIN B12) 500 mcg tablet Take 500 mcg by mouth daily.  multivitamin (ONE A DAY) tablet Take 1 Tab by mouth daily.  polyethylene glycol (MIRALAX) 17 gram/dose powder Take 17 g by mouth daily.  furosemide (LASIX) 20 mg tablet Take 40 mg by mouth daily.          Past History     Past Medical History:  Past Medical History:   Diagnosis Date    Arthritis     Asthma     CAD (coronary artery disease)     Heart disease     High cholesterol     Hyperlipidemia        Past Surgical History:  Past Surgical History:   Procedure Laterality Date    HX APPENDECTOMY      HX COLONOSCOPY  01/26/2015    per gi no further colonoscopy needed due to age of the patient, see note 1/26/15    HX HERNIA REPAIR Left 2001    inguinal    HX HYSTERECTOMY      [place] CARDIAC SURG PROCEDURE UNLIST  2007    defibrillator placed       Family History:  Family History   Problem Relation Age of Onset    Emphysema Mother        Social History:  Social History     Tobacco Use    Smoking status: Former Smoker    Smokeless tobacco: Never Used   Vaping Use    Vaping Use: Never used   Substance Use Topics    Alcohol use: Not Currently    Drug use: Never       Allergies: Allergies   Allergen Reactions    Codeine Nausea and Vomiting         Review of Systems     Review of Systems   Constitutional: Positive for activity change, appetite change and fatigue. HENT: Negative. Eyes: Negative. Respiratory: Positive for shortness of breath. Cardiovascular: Negative. Gastrointestinal: Positive for abdominal pain and nausea. Genitourinary: Negative. Musculoskeletal: Negative. Neurological: Negative. All other systems reviewed and are negative. Physical Exam     Physical Exam  Vitals and nursing note reviewed. Constitutional:       General: She is in acute distress. Appearance: She is ill-appearing. HENT:      Head: Normocephalic. Eyes:      Extraocular Movements: Extraocular movements intact. Pupils: Pupils are equal, round, and reactive to light. Cardiovascular:      Rate and Rhythm: Normal rate and regular rhythm. Heart sounds: No murmur heard. Pulmonary:      Effort: Respiratory distress present. Breath sounds: No stridor. No wheezing or rales. Comments: Tachypnea  Abdominal:      General: Abdomen is flat. Bowel sounds are normal.      Tenderness:  There is abdominal tenderness in the periumbilical area. Hernia: No hernia is present. Skin:     General: Skin is warm. Neurological:      General: No focal deficit present. Mental Status: She is alert and oriented to person, place, and time. Lab and Diagnostic Study Results     Labs -     Recent Results (from the past 12 hour(s))   CBC WITH AUTOMATED DIFF    Collection Time: 09/30/21 11:00 PM   Result Value Ref Range    WBC 12.7 4.6 - 13.2 K/uL    RBC 3.65 (L) 4.20 - 5.30 M/uL    HGB 11.5 (L) 12.0 - 16.0 g/dL    HCT 36.4 35.0 - 45.0 %    MCV 99.7 78.0 - 100.0 FL    MCH 31.5 24.0 - 34.0 PG    MCHC 31.6 31.0 - 37.0 g/dL    RDW 15.9 (H) 11.6 - 14.5 %    PLATELET 874 747 - 702 K/uL    MPV 10.2 9.2 - 11.8 FL    NRBC 0.0 0.0  WBC    ABSOLUTE NRBC 0.00 0.00 - 0.01 K/uL    NEUTROPHILS 87 (H) 40 - 73 %    LYMPHOCYTES 8 (L) 21 - 52 %    MONOCYTES 5 3 - 10 %    EOSINOPHILS 0 0 - 5 %    BASOPHILS 0 0 - 2 %    IMMATURE GRANULOCYTES 0 0 - 0.5 %    ABS. NEUTROPHILS 10.9 (H) 1.8 - 8.0 K/UL    ABS. LYMPHOCYTES 1.0 0.9 - 3.6 K/UL    ABS. MONOCYTES 0.7 0.05 - 1.2 K/UL    ABS. EOSINOPHILS 0.0 0.0 - 0.4 K/UL    ABS. BASOPHILS 0.0 0.0 - 0.1 K/UL    ABS. IMM. GRANS. 0.1 (H) 0.00 - 0.04 K/UL    DF AUTOMATED     METABOLIC PANEL, COMPREHENSIVE    Collection Time: 09/30/21 11:00 PM   Result Value Ref Range    Sodium 138 135 - 145 mmol/L    Potassium 3.9 3.2 - 5.1 mmol/L    Chloride 104 94 - 111 mmol/L    CO2 25 21 - 33 mmol/L    Anion gap 9 mmol/L    Glucose 113 (H) 70 - 110 mg/dL    BUN 10 9 - 21 mg/dL    Creatinine 0.50 (L) 0.70 - 1.20 mg/dL    BUN/Creatinine ratio 20      GFR est AA >60 ml/min/1.73m2    GFR est non-AA >60 ml/min/1.73m2    Calcium 8.6 8.5 - 10.5 mg/dL    Bilirubin, total 0.8 0.2 - 1.0 mg/dL    AST (SGOT) 25 14 - 74 U/L    ALT (SGPT) 15 3 - 52 U/L    Alk.  phosphatase 41 38 - 126 U/L    Protein, total 6.9 6.1 - 8.4 g/dL    Albumin 2.9 (L) 3.5 - 4.7 g/dL    Globulin 4.0 g/dL    A-G Ratio 0.7     LACTIC ACID    Collection Time: 09/30/21 11:00 PM   Result Value Ref Range    Lactic acid 1.0 0.5 - 2.0 mmol/L   LIPASE    Collection Time: 09/30/21 11:00 PM   Result Value Ref Range    Lipase 23 10 - 57 U/L   AMYLASE    Collection Time: 09/30/21 11:00 PM   Result Value Ref Range    Amylase 29 (L) 30 - 100 U/L       Radiologic Studies -   [unfilled]  CT Results  (Last 48 hours)               10/01/21 0013  CT ABD PELV W CONT Final result    Impression:  Descending and sigmoid colon diverticulosis with sigmoid colonic   thickening and a few areas of surrounding infiltration. Consider mild acute   diverticulitis superimposed on a chronic diverticular process. Small bilateral pleural effusions. Patchy consolidation at the right lung base possibly residual pneumonia as an   infiltrate was seen on previous study. Small pericardial effusion. Narrative:  EXAM: CT of the abdomen and pelvis       INDICATION: Pain. COMPARISON: September 11, 2021. TECHNIQUE: Axial CT imaging of the abdomen and pelvis was performed with   intravenous contrast. Multiplanar reformats were generated. Dose reduction   techniques used: automated exposure control, adjustment of the mAs and/or kVp   according to patient size, and iterative reconstruction techniques. Digital   imaging and communications in Medicine (DICOM) format image data are available   to nonaffiliated external healthcare facilities or entities on a secure, media   free, reciprocally searchable basis with patient authorization for at least 12   months after this study. _______________       FINDINGS:       LOWER CHEST: There are small bilateral pleural effusions larger on the right. There is patchy consolidation at the right lung base. There is a small   pericardial effusion. The visualized ascending aorta appears to be ectatic. LIVER, BILIARY: Liver is normal. No biliary dilation. Gallbladder is   unremarkable.        PANCREAS: Normal. SPLEEN: Normal.       ADRENALS: Normal.       KIDNEYS: There are a few small left renal cysts. There is no hydronephrosis. LYMPH NODES: No enlarged lymph nodes. GASTROINTESTINAL TRACT: There is diverticulosis of the descending and sigmoid   colon. There is a long segment of thickened sigmoid colon with a few areas of   surrounding infiltration. PELVIC ORGANS: Unremarkable. VASCULATURE: Unremarkable. BONES: There is lumbosacral degenerative change with curvature to the right. There is a chronic T12 compression fracture       OTHER: None.       _______________               CXR Results  (Last 48 hours)    None          Medical Decision Making and ED Course   - I am the first and primary provider for this patient AND AM THE PRIMARY PROVIDER OF RECORD. - I reviewed the vital signs, available nursing notes, past medical history, past surgical history, family history and social history. - Initial assessment performed. The patients presenting problems have been discussed, and the staff are in agreement with the care plan formulated and outlined with them. I have encouraged them to ask questions as they arise throughout their visit. Vital Signs-Reviewed the patient's vital signs.     Patient Vitals for the past 12 hrs:   Temp Pulse Resp BP SpO2   10/01/21 0229  80 20 (!) 100/45 96 %   10/01/21 0128  86 20 116/60 97 %   10/01/21 0028  85 22 119/78 96 %   10/01/21 0013  83 22 (!) 137/56 98 %   09/30/21 2328  75 22 133/63 95 %   09/30/21 2228  99 24 (!) 149/61 95 %   09/30/21 2158     96 %   09/30/21 2156 98.4 °F (36.9 °C) 81 24 (!) 145/77 96 %           Records Reviewed: Nursing Notes    The patient presents with abdominal pain with a differential diagnosis of abdominal pain and diverticulitis    ED Course:              Provider Notes (Medical Decision Making):     MDM  Number of Diagnoses or Management Options     Amount and/or Complexity of Data Reviewed  Clinical lab tests: reviewed and ordered  Tests in the radiology section of CPT®: reviewed and ordered    Risk of Complications, Morbidity, and/or Mortality  General comments: Patient states she is DNR. She and her daughter state they do not want her lung mass addressed at this time and would like DNR/DNI and possible Hospice placement. She wants to stop hurting and wants pain medication fluids and antibiotics if necessary. She asked for a repeat CT scan of her abdomen. CXR Chronic COPD changes, no PTX, no consolidative infiltrate. Resolving RLL infiltrate. PPM present               Consultations:       Consultations: -  Hospitalist Consultant: Dr. Bibi Hoyt NP: We have asked for emergent assistance with regard to this patient. We have discussed the patients HPI, ROS, PE and results this far. They will come and evaluate the patient for admission. Procedures and Critical Care       Performed by: Kimberly Busby MD  PROCEDURES:  Procedures                   Disposition     Disposition: Admitted to [facility] the case was discussed with the admitting physician Marycarmen Clement MD        Remove if not discharged  DISCHARGE PLAN:  1. Current Discharge Medication List      CONTINUE these medications which have NOT CHANGED    Details   tamsulosin (Flomax) 0.4 mg capsule Take 0.4 mg by mouth daily. albuterol (PROVENTIL HFA, VENTOLIN HFA, PROAIR HFA) 90 mcg/actuation inhaler Take 2 Puffs by inhalation every six (6) hours as needed for Wheezing. Indications: chronic obstructive pulmonary disease  Qty: 1 Inhaler, Refills: 3    Associated Diagnoses: Chronic obstructive pulmonary disease, unspecified COPD type (HCC)      lidocaine (LIDODERM) 5 % Apply patch to the affected area for 12 hours a day and remove for 12 hours a day. Qty: 30 Each, Refills: 2    Associated Diagnoses: Trapezius muscle spasm      promethazine (PHENERGAN) 25 mg tablet Take 1 Tab by mouth every twelve (12) hours as needed for Nausea.   Qty: 60 Tab, Refills: 3    Associated Diagnoses: Nausea      lisinopriL (PRINIVIL, ZESTRIL) 20 mg tablet Take 20 mg by mouth daily. famotidine (PEPCID) 20 mg tablet Take 20 mg by mouth two (2) times a day. Linzess 72 mcg cap capsule Take 1 capsule by mouth once daily for 90 days  Qty: 90 Cap, Refills: 0      arformoteroL (BROVANA) 15 mcg/2 mL nebu neb solution 15 mcg by Nebulization route. cetirizine (ZYRTEC) 5 mg tablet Take 5 mg by mouth daily. fluticasone propionate (FLONASE ALLERGY RELIEF NA) 1 Spray by Nasal route. levocetirizine (XYZAL) 5 mg tablet Take 5 mg by mouth daily. Oxygen 2 Liters at night      methocarbamoL (ROBAXIN) 500 mg tablet Take 1 Tab by mouth four (4) times daily. Qty: 360 Tab, Refills: 3    Associated Diagnoses: Night muscle spasms      apixaban (ELIQUIS) 2.5 mg tablet Take 2.5 mg by mouth two (2) times a day. simvastatin (ZOCOR) 20 mg tablet Take 20 mg by mouth nightly. cyanocobalamin (VITAMIN B12) 500 mcg tablet Take 500 mcg by mouth daily. calcium citrate-vitamin d3 (CITRACAL+D) 315 mg-5 mcg (200 unit) tab Take 1 Tab by mouth daily (with breakfast). Omega-3 Fatty Acids 60- mg cpDR Take  by mouth.      multivitamin (ONE A DAY) tablet Take 1 Tab by mouth daily. polyethylene glycol (MIRALAX) 17 gram/dose powder Take 17 g by mouth daily. furosemide (LASIX) 20 mg tablet Take 40 mg by mouth daily. carvediloL (COREG) 6.25 mg tablet Take 3.125 mg by mouth two (2) times daily (with meals). 2.   Follow-up Information    None       3. Return to ED if worse   4. Current Discharge Medication List          Diagnosis     Clinical Impression:   1. Diverticulitis    2. Malaise and fatigue    3. Pulmonary emphysema, unspecified emphysema type ([facility]        Attestations:    Gabbie Wall MD    Please note that this dictation was completed with Lib[employer], the [employer] voice recognition software.   Quite often unanticipated grammatical, syntax, homophones, and other interpretive errors are inadvertently transcribed by the computer software. Please disregard these errors. Please excuse any errors that have escaped final proofreading. Thank you.

## 2021-10-01 NOTE — ED NOTES
Daughter states DNR and living will on record in [address] Daughter requested 02 mask as mother is a mouth breather but does wear cannula at home. Daughter agreed to keep cannula as patient's 02 sat is 96 on 2 l/nc.

## 2021-10-01 NOTE — PROGRESS NOTES
Received care of patient resting in bed patient tolerated am medications patient brief cleaned of large BM. IVF infusing. patient positioned for comfort.   CB in reach

## 2021-10-01 NOTE — ASSESSMENT & PLAN NOTE
This is a recurrent problem  CT scan tonight showed mild diverticulitis  GI consultation  Continue Zosyn started in ED

## 2021-10-01 NOTE — ASSESSMENT & PLAN NOTE
This is a chronic problem  Patient and daughter do not want any advanced measures for this lung mass considered malignant

## 2021-10-02 LAB
ALBUMIN SERPL-MCNC: 2.6 G/DL (ref 3.5–4.7)
ALBUMIN/GLOB SERPL: 0.7 {RATIO}
ALP SERPL-CCNC: 40 U/L (ref 38–126)
ALT SERPL-CCNC: 12 U/L (ref 3–52)
ANION GAP SERPL CALC-SCNC: 14 MMOL/L
ANION GAP SERPL CALC-SCNC: 15 MMOL/L
ARTERIAL PATENCY WRIST A: ABNORMAL
AST SERPL W P-5'-P-CCNC: 22 U/L (ref 14–74)
BASE DEFICIT BLDA-SCNC: 7.2 MMOL/L (ref 0–2.5)
BASOPHILS # BLD: 0 K/UL (ref 0–0.1)
BASOPHILS NFR BLD: 0 % (ref 0–2)
BDY SITE: ABNORMAL
BILIRUB SERPL-MCNC: 1 MG/DL (ref 0.2–1)
BUN SERPL-MCNC: 8 MG/DL (ref 9–21)
BUN SERPL-MCNC: 8 MG/DL (ref 9–21)
BUN/CREAT SERPL: 13
BUN/CREAT SERPL: 13
CA-I BLD-MCNC: 7.3 MG/DL (ref 8.5–10.5)
CA-I BLD-MCNC: 7.4 MG/DL (ref 8.5–10.5)
CHLORIDE SERPL-SCNC: 107 MMOL/L (ref 94–111)
CHLORIDE SERPL-SCNC: 109 MMOL/L (ref 94–111)
CO2 SERPL-SCNC: 16 MMOL/L (ref 21–33)
CO2 SERPL-SCNC: 18 MMOL/L (ref 21–33)
COHGB MFR BLD: 0.3 % (ref 0–3)
CREAT SERPL-MCNC: 0.6 MG/DL (ref 0.7–1.2)
CREAT SERPL-MCNC: 0.6 MG/DL (ref 0.7–1.2)
DIFFERENTIAL METHOD BLD: ABNORMAL
EOSINOPHIL # BLD: 0.1 K/UL (ref 0–0.4)
EOSINOPHIL NFR BLD: 0 % (ref 0–5)
ERYTHROCYTE [DISTWIDTH] IN BLOOD BY AUTOMATED COUNT: 16 % (ref 11.6–14.5)
FIO2 ON VENT: 29.6 %
GAS FLOW.O2 O2 DELIVERY SYS: 2.5 L/MIN
GLOBULIN SER CALC-MCNC: 3.6 G/DL
GLUCOSE BLD STRIP.AUTO-MCNC: 82 MG/DL (ref 70–110)
GLUCOSE SERPL-MCNC: 50 MG/DL (ref 70–110)
GLUCOSE SERPL-MCNC: 71 MG/DL (ref 70–110)
HCO3 BLDA-SCNC: 16 MMOL/L (ref 23–27)
HCT VFR BLD AUTO: 34 % (ref 35–45)
HGB BLD OXIMETRY-MCNC: 11.4 G/DL (ref 12–16)
HGB BLD-MCNC: 10.4 G/DL (ref 12–16)
IMM GRANULOCYTES # BLD AUTO: 0.1 K/UL (ref 0–0.04)
IMM GRANULOCYTES NFR BLD AUTO: 1 % (ref 0–0.5)
LACTATE SERPL-SCNC: 1 MMOL/L (ref 0.5–2)
LYMPHOCYTES # BLD: 2.1 K/UL (ref 0.9–3.6)
LYMPHOCYTES NFR BLD: 13 % (ref 21–52)
MAGNESIUM SERPL-MCNC: 1.8 MG/DL (ref 1.7–2.8)
MCH RBC QN AUTO: 31.3 PG (ref 24–34)
MCHC RBC AUTO-ENTMCNC: 30.6 G/DL (ref 31–37)
MCV RBC AUTO: 102.4 FL (ref 78–100)
METHGB MFR BLD: 0.2 % (ref 0–3)
MONOCYTES # BLD: 1 K/UL (ref 0.05–1.2)
MONOCYTES NFR BLD: 6 % (ref 3–10)
NEUTS SEG # BLD: 12.4 K/UL (ref 1.8–8)
NEUTS SEG NFR BLD: 80 % (ref 40–73)
NRBC # BLD: 0 K/UL (ref 0–0.01)
NRBC BLD-RTO: 0 PER 100 WBC
OXYHGB MFR BLD: 95.3 % (ref 90–100)
PCO2 BLDA: 26 MMHG (ref 35–45)
PERFORMED BY, TECHID: NORMAL
PH BLDA: 7.41 [PH] (ref 7.37–7.43)
PHOSPHATE SERPL-MCNC: 2.2 MG/DL (ref 2.5–4.5)
PLATELET # BLD AUTO: 306 K/UL (ref 135–420)
PMV BLD AUTO: 9.9 FL (ref 9.2–11.8)
PO2 BLDA: 78 MMHG (ref 84–98)
POTASSIUM SERPL-SCNC: 3.1 MMOL/L (ref 3.2–5.1)
POTASSIUM SERPL-SCNC: 3.4 MMOL/L (ref 3.2–5.1)
PROT SERPL-MCNC: 6.2 G/DL (ref 6.1–8.4)
RBC # BLD AUTO: 3.32 M/UL (ref 4.2–5.3)
SAO2 % BLD: 96 % (ref 94–100)
SAO2% DEVICE SAO2% SENSOR NAME: ABNORMAL
SODIUM SERPL-SCNC: 139 MMOL/L (ref 135–145)
SODIUM SERPL-SCNC: 140 MMOL/L (ref 135–145)
SPECIMEN SITE: ABNORMAL
WBC # BLD AUTO: 15.6 K/UL (ref 4.6–13.2)

## 2021-10-02 PROCEDURE — 77010033678 HC OXYGEN DAILY

## 2021-10-02 PROCEDURE — 85025 COMPLETE CBC W/AUTO DIFF WBC: CPT

## 2021-10-02 PROCEDURE — 80048 BASIC METABOLIC PNL TOTAL CA: CPT

## 2021-10-02 PROCEDURE — 74011250636 HC RX REV CODE- 250/636: Performed by: NURSE PRACTITIONER

## 2021-10-02 PROCEDURE — 82962 GLUCOSE BLOOD TEST: CPT

## 2021-10-02 PROCEDURE — 74011250636 HC RX REV CODE- 250/636: Performed by: INTERNAL MEDICINE

## 2021-10-02 PROCEDURE — 36600 WITHDRAWAL OF ARTERIAL BLOOD: CPT

## 2021-10-02 PROCEDURE — 84100 ASSAY OF PHOSPHORUS: CPT

## 2021-10-02 PROCEDURE — 74011250637 HC RX REV CODE- 250/637: Performed by: NURSE PRACTITIONER

## 2021-10-02 PROCEDURE — 83735 ASSAY OF MAGNESIUM: CPT

## 2021-10-02 PROCEDURE — 82803 BLOOD GASES ANY COMBINATION: CPT

## 2021-10-02 PROCEDURE — 74011250637 HC RX REV CODE- 250/637: Performed by: INTERNAL MEDICINE

## 2021-10-02 PROCEDURE — 74011250637 HC RX REV CODE- 250/637: Performed by: PHYSICIAN ASSISTANT

## 2021-10-02 PROCEDURE — 65270000029 HC RM PRIVATE

## 2021-10-02 PROCEDURE — 94761 N-INVAS EAR/PLS OXIMETRY MLT: CPT

## 2021-10-02 PROCEDURE — 80053 COMPREHEN METABOLIC PANEL: CPT

## 2021-10-02 PROCEDURE — 83605 ASSAY OF LACTIC ACID: CPT

## 2021-10-02 PROCEDURE — 94640 AIRWAY INHALATION TREATMENT: CPT

## 2021-10-02 PROCEDURE — 36415 COLL VENOUS BLD VENIPUNCTURE: CPT

## 2021-10-02 RX ORDER — OXYCODONE AND ACETAMINOPHEN 5; 325 MG/1; MG/1
1 TABLET ORAL
Status: DISCONTINUED | OUTPATIENT
Start: 2021-10-02 | End: 2021-10-06 | Stop reason: HOSPADM

## 2021-10-02 RX ORDER — DEXTROSE, SODIUM CHLORIDE, AND POTASSIUM CHLORIDE 5; .45; .15 G/100ML; G/100ML; G/100ML
100 INJECTION INTRAVENOUS CONTINUOUS
Status: DISCONTINUED | OUTPATIENT
Start: 2021-10-02 | End: 2021-10-06 | Stop reason: HOSPADM

## 2021-10-02 RX ORDER — LOPERAMIDE HYDROCHLORIDE 2 MG/1
2 CAPSULE ORAL ONCE
Status: COMPLETED | OUTPATIENT
Start: 2021-10-02 | End: 2021-10-02

## 2021-10-02 RX ADMIN — CYANOCOBALAMIN TAB 500 MCG 500 MCG: 500 TAB at 09:27

## 2021-10-02 RX ADMIN — DICYCLOMINE HYDROCHLORIDE 10 MG: 10 CAPSULE ORAL at 16:53

## 2021-10-02 RX ADMIN — FAMOTIDINE 20 MG: 20 TABLET ORAL at 18:00

## 2021-10-02 RX ADMIN — FLUTICASONE PROPIONATE 2 SPRAY: 50 SPRAY, METERED NASAL at 09:27

## 2021-10-02 RX ADMIN — DICYCLOMINE HYDROCHLORIDE 10 MG: 10 CAPSULE ORAL at 23:02

## 2021-10-02 RX ADMIN — LOPERAMIDE HYDROCHLORIDE 2 MG: 2 CAPSULE ORAL at 05:39

## 2021-10-02 RX ADMIN — BUDESONIDE AND FORMOTEROL FUMARATE DIHYDRATE 2 PUFF: 160; 4.5 AEROSOL RESPIRATORY (INHALATION) at 20:02

## 2021-10-02 RX ADMIN — ATORVASTATIN CALCIUM 10 MG: 10 TABLET, FILM COATED ORAL at 22:47

## 2021-10-02 RX ADMIN — FAMOTIDINE 20 MG: 20 TABLET ORAL at 09:27

## 2021-10-02 RX ADMIN — POTASSIUM CHLORIDE, DEXTROSE MONOHYDRATE AND SODIUM CHLORIDE 100 ML/HR: 150; 5; 450 INJECTION, SOLUTION INTRAVENOUS at 11:49

## 2021-10-02 RX ADMIN — SODIUM CHLORIDE 100 ML/HR: 9 INJECTION, SOLUTION INTRAVENOUS at 03:43

## 2021-10-02 RX ADMIN — Medication 10 ML: at 05:39

## 2021-10-02 RX ADMIN — POTASSIUM CHLORIDE, DEXTROSE MONOHYDRATE AND SODIUM CHLORIDE 100 ML/HR: 150; 5; 450 INJECTION, SOLUTION INTRAVENOUS at 22:48

## 2021-10-02 RX ADMIN — VANCOMYCIN HYDROCHLORIDE 250 MG: KIT at 05:39

## 2021-10-02 RX ADMIN — Medication 10 ML: at 14:03

## 2021-10-02 RX ADMIN — Medication 10 ML: at 22:48

## 2021-10-02 RX ADMIN — APIXABAN 2.5 MG: 2.5 TABLET, FILM COATED ORAL at 17:12

## 2021-10-02 RX ADMIN — BUDESONIDE AND FORMOTEROL FUMARATE DIHYDRATE 2 PUFF: 160; 4.5 AEROSOL RESPIRATORY (INHALATION) at 08:57

## 2021-10-02 RX ADMIN — VANCOMYCIN HYDROCHLORIDE 250 MG: KIT at 23:39

## 2021-10-02 RX ADMIN — VANCOMYCIN HYDROCHLORIDE 250 MG: KIT at 13:11

## 2021-10-02 RX ADMIN — VANCOMYCIN HYDROCHLORIDE 250 MG: KIT at 17:15

## 2021-10-02 RX ADMIN — SODIUM CHLORIDE, POTASSIUM CHLORIDE, SODIUM LACTATE AND CALCIUM CHLORIDE 1000 ML: 600; 310; 30; 20 INJECTION, SOLUTION INTRAVENOUS at 09:26

## 2021-10-02 RX ADMIN — TAMSULOSIN HYDROCHLORIDE 0.4 MG: 0.4 CAPSULE ORAL at 09:27

## 2021-10-02 RX ADMIN — APIXABAN 2.5 MG: 2.5 TABLET, FILM COATED ORAL at 09:27

## 2021-10-02 NOTE — PROGRESS NOTES
Hospitalist Progress Note             Date of Service:  10/2/2021  NAME:  Honey Quach  :  1934  MRN:  [id number]    Assessment & Plan:    C dif colitis  - remains extremely uncomfortable  - started po vanco,   - contact isolation    Metabolic acidosis  - abg reviewed, fully compensated  - check lactic acid- p  - cont ivf     COPD (chronic obstructive pulmonary disease) (HCC)  Continue Brovana and Proventil     Dehydration  Poor p.o. intake per daughter  Patient very thin emaciated       Diverticulitis- no not suspect active diverticulitis  CT scan tonight showed mild diverticulitis  - dc all abx, this will only worsen cdif     Essential hypertension  holding Coreg, Lasix, lisinopril- borderline low bp     Lung mass  Patient and daughter do not want any advanced measures for this lung mass considered malignant        Paroxysmal atrial fibrillation (HCC)  Patient on Eliquis  hodling Coreg  Patient has an [address] Problems  Date Reviewed: 2021        Codes Class Noted POA    COPD (chronic obstructive pulmonary disease) ([facility] ICD-10-CM: J44.9  ICD-9-CM: 496  10/1/2021 Unknown        Diverticulitis ICD-10-CM: K57.92  ICD-9-CM: 562.11  10/1/2021 Unknown        Dehydration ICD-10-CM: E86.0  ICD-9-CM: 276.51  10/1/2021 Unknown        Physical deconditioning ICD-10-CM: R53.81  ICD-9-CM: 799.3  10/1/2021 Unknown        Clostridium difficile colitis ICD-10-CM: A04.72  ICD-9-CM: 008.45  10/1/2021 Unknown        Paroxysmal atrial fibrillation ([facility] ICD-10-CM: I48.0  ICD-9-CM: 427.31  2021 Yes        Abdominal pain ICD-10-CM: R10.9  ICD-9-CM: 789.00  2021 Unknown        Lung mass ICD-10-CM: R91.8  ICD-9-CM: 786.6  2021 Yes        Essential hypertension ICD-10-CM: I10  ICD-9-CM: 401.9  Unknown Yes    Overview Signed 2020 12:03 PM by Alexandro Sandifer     04- visitBP stable.   continue meds  -Discussed checking BP at home occasionally and recording for f/u visit.  -Patient is aware to call our office if BP is greater than 150/90 mmHg or less than 100/60 mmHg.  -Discussed low salt dieting and exercising 150 minutes/week. Last modified by DELORES Tabor  10-, 09:13                     Review of Systems:   A comprehensive review of systems was negative except for that written in the HPI. Vital Signs:    Last 24hrs VS reviewed since prior progress note. Most recent are:  Visit Vitals  BP (!) 110/46 (BP 1 Location: Right upper arm, BP Patient Position: At rest)   Pulse 89   Temp 98.4 °F (36.9 °C)   Resp 20   Ht 5' 3\" (1.6 m)   Wt 60.6 kg (133 lb 9.6 oz)   SpO2 97%   BMI 23.67 kg/m²       No intake or output data in the 24 hours ending 10/02/21 0095     Physical Examination:       General:          Alert, cooperative, moderate distress, appears stated age. HEENT:           Atraumatic, anicteric sclerae, pink conjunctivae                          No oral ulcers, mucosa moist, throat clear, dentition fair  Neck:               Supple, symmetrical  Lungs:             Clear to auscultation bilaterally. No Wheezing or Rhonchi. No rales. Chest wall:      No tenderness  No Accessory muscle use. Heart:              Regular  rhythm,  No  murmur   No edema  Abdomen:        Soft, + tender. Not distended. Bowel sounds normal  Extremities:     No cyanosis. No clubbing,                            Skin turgor normal, Capillary refill normal  Skin:                Not pale. Not Jaundiced  No rashes   Psych:             Not anxious or agitated.   Neurologic:      Alert, moves all extremities, answers questions appropriately and responds to commands        Data Review:    Review and/or order of clinical lab test  Review and/or order of tests in the radiology section of CPT  Review and/or order of tests in the medicine section of CPT      Labs:     Recent Labs     10/02/21  2334 09/30/21  2300   WBC 15.6* 12.7   HGB 10.4* 11.5*   HCT 34.0* 36.4    353     Recent Labs     10/02/21  0450 10/02/21  0449 09/30/21  2300   NA  --  140 138   K  --  3.4 3.9   CL  --  109 104   CO2  --  16* 25   BUN  --  8* 10   CREA  --  0.60* 0.50*   GLU  --  50* 113*   CA  --  7.4* 8.6   MG  --  1.8  --    PHOS 2.2*  --   --      Recent Labs     10/02/21  0449 09/30/21  2300   ALT 12 15   AP 40 41   TBILI 1.0 0.8   TP 6.2 6.9   ALB 2.6* 2.9*   GLOB 3.6 4.0   AML  --  29*   LPSE  --  23     No results for input(s): INR, PTP, APTT, INREXT in the last 72 hours. No results for input(s): FE, TIBC, PSAT, FERR in the last 72 hours. No results found for: FOL, RBCF   Recent Labs     10/02/21  0745   PH 7.41   PCO2 26*   PO2 78*     No results for input(s): CPK, CKNDX, TROIQ in the last 72 hours.     No lab exists for component: CPKMB  Lab Results   Component Value Date/Time    Cholesterol, total 147 03/18/2021 11:27 AM    HDL Cholesterol 82 03/18/2021 11:27 AM    LDL, calculated 45.6 03/18/2021 11:27 AM    Triglyceride 97 03/18/2021 11:27 AM    CHOL/HDL Ratio 1.8 03/18/2021 11:27 AM     Lab Results   Component Value Date/Time    Glucose (POC) 82 10/02/2021 06:41 AM     No results found for: COLOR, APPRN, SPGRU, REFSG, ANDREY, PROTU, GLUCU, KETU, BILU, UROU, SUSAN, LEUKU, GLUKE, EPSU, BACTU, WBCU, RBCU, CASTS, UCRY      Medications Reviewed:     Current Facility-Administered Medications   Medication Dose Route Frequency    lactated ringers bolus infusion 1,000 mL  1,000 mL IntraVENous ONCE    dextrose 5% - 0.45% NaCl with KCl 20 mEq/L infusion  100 mL/hr IntraVENous CONTINUOUS    sodium chloride (NS) flush 5-40 mL  5-40 mL IntraVENous Q8H    sodium chloride (NS) flush 5-40 mL  5-40 mL IntraVENous PRN    acetaminophen (TYLENOL) tablet 650 mg  650 mg Oral Q6H PRN    Or    acetaminophen (TYLENOL) suppository 650 mg  650 mg Rectal Q6H PRN    polyethylene glycol (MIRALAX) packet 17 g  17 g Oral DAILY PRN    ondansetron (ZOFRAN ODT) tablet 4 mg  4 mg Oral Q8H PRN    Or    ondansetron (ZOFRAN) injection 4 mg  4 mg IntraVENous Q6H PRN    albuterol (PROVENTIL HFA, VENTOLIN HFA, PROAIR HFA) inhaler 2 Puff  2 Puff Inhalation Q6H PRN    apixaban (ELIQUIS) tablet 2.5 mg  2.5 mg Oral BID    [Held by provider] carvediloL (COREG) tablet 3.125 mg  3.125 mg Oral BID WITH MEALS    cyanocobalamin (VITAMIN B12) tablet 500 mcg  500 mcg Oral DAILY    dicyclomine (BENTYL) capsule 10 mg  10 mg Oral Q6H PRN    famotidine (PEPCID) tablet 20 mg  20 mg Oral BID    fluticasone propionate (FLONASE) 50 mcg/actuation nasal spray 2 Spray  2 Spray Both Nostrils DAILY    [Held by provider] furosemide (LASIX) tablet 40 mg  40 mg Oral DAILY    linaCLOtide (LINZESS) caspule 72 mcg- PT MUST BRING FROM HOME (Patient Supplied)  72 mcg Oral ACB    [Held by provider] lisinopriL (PRINIVIL, ZESTRIL) tablet 20 mg  20 mg Oral DAILY    multivitamin (ONE A DAY) tablet 1 Tablet  1 Tablet Oral DAILY    atorvastatin (LIPITOR) tablet 10 mg  10 mg Oral QHS    tamsulosin (FLOMAX) capsule 0.4 mg  0.4 mg Oral DAILY    budesonide-formoteroL (SYMBICORT) 160-4.5 mcg/actuation HFA inhaler 2 Puff  2 Puff Inhalation BID RT    vancomycin (FIRVANQ) 50 mg/mL oral solution 250 mg  250 mg Oral Q6H     ______________________________________________________________________  EXPECTED LENGTH OF STAY: 2d 21h  ACTUAL LENGTH OF STAY:          1                 Paul Saenz MD

## 2021-10-02 NOTE — PROGRESS NOTES
Perineal care provided for incontinence of urine and loose, green, mucous stool x4 overnight. Moisture barrier cream and incontinence brief applied. Water offered and accepted. CBWR.

## 2021-10-03 LAB
ANION GAP SERPL CALC-SCNC: 7 MMOL/L
BASOPHILS # BLD: 0 K/UL (ref 0–0.1)
BASOPHILS NFR BLD: 0 % (ref 0–2)
BUN SERPL-MCNC: 4 MG/DL (ref 9–21)
BUN/CREAT SERPL: 8
CA-I BLD-MCNC: 7.2 MG/DL (ref 8.5–10.5)
CHLORIDE SERPL-SCNC: 111 MMOL/L (ref 94–111)
CO2 SERPL-SCNC: 22 MMOL/L (ref 21–33)
CREAT SERPL-MCNC: 0.5 MG/DL (ref 0.7–1.2)
DIFFERENTIAL METHOD BLD: ABNORMAL
EOSINOPHIL # BLD: 0.2 K/UL (ref 0–0.4)
EOSINOPHIL NFR BLD: 1 % (ref 0–5)
ERYTHROCYTE [DISTWIDTH] IN BLOOD BY AUTOMATED COUNT: 15.9 % (ref 11.6–14.5)
GLUCOSE SERPL-MCNC: 103 MG/DL (ref 70–110)
HCT VFR BLD AUTO: 32.2 % (ref 35–45)
HGB BLD-MCNC: 10 G/DL (ref 12–16)
IMM GRANULOCYTES # BLD AUTO: 0.1 K/UL (ref 0–0.04)
IMM GRANULOCYTES NFR BLD AUTO: 1 % (ref 0–0.5)
LYMPHOCYTES # BLD: 2 K/UL (ref 0.9–3.6)
LYMPHOCYTES NFR BLD: 16 % (ref 21–52)
MAGNESIUM SERPL-MCNC: 1.7 MG/DL (ref 1.7–2.8)
MCH RBC QN AUTO: 30.9 PG (ref 24–34)
MCHC RBC AUTO-ENTMCNC: 31.1 G/DL (ref 31–37)
MCV RBC AUTO: 99.4 FL (ref 78–100)
MONOCYTES # BLD: 0.9 K/UL (ref 0.05–1.2)
MONOCYTES NFR BLD: 7 % (ref 3–10)
NEUTS SEG # BLD: 9.6 K/UL (ref 1.8–8)
NEUTS SEG NFR BLD: 75 % (ref 40–73)
NRBC # BLD: 0 K/UL (ref 0–0.01)
NRBC BLD-RTO: 0 PER 100 WBC
PHOSPHATE SERPL-MCNC: 1.1 MG/DL (ref 2.5–4.5)
PLATELET # BLD AUTO: 321 K/UL (ref 135–420)
PMV BLD AUTO: 10 FL (ref 9.2–11.8)
POTASSIUM SERPL-SCNC: 3.5 MMOL/L (ref 3.2–5.1)
RBC # BLD AUTO: 3.24 M/UL (ref 4.2–5.3)
SARS-COV-2, COV2NT: NOT DETECTED
SODIUM SERPL-SCNC: 140 MMOL/L (ref 135–145)
WBC # BLD AUTO: 12.8 K/UL (ref 4.6–13.2)

## 2021-10-03 PROCEDURE — 65270000029 HC RM PRIVATE

## 2021-10-03 PROCEDURE — 84100 ASSAY OF PHOSPHORUS: CPT

## 2021-10-03 PROCEDURE — 74011250637 HC RX REV CODE- 250/637: Performed by: NURSE PRACTITIONER

## 2021-10-03 PROCEDURE — 80048 BASIC METABOLIC PNL TOTAL CA: CPT

## 2021-10-03 PROCEDURE — 85025 COMPLETE CBC W/AUTO DIFF WBC: CPT

## 2021-10-03 PROCEDURE — 77010033678 HC OXYGEN DAILY

## 2021-10-03 PROCEDURE — 83735 ASSAY OF MAGNESIUM: CPT

## 2021-10-03 PROCEDURE — 36415 COLL VENOUS BLD VENIPUNCTURE: CPT

## 2021-10-03 PROCEDURE — 94640 AIRWAY INHALATION TREATMENT: CPT

## 2021-10-03 PROCEDURE — 74011250636 HC RX REV CODE- 250/636: Performed by: INTERNAL MEDICINE

## 2021-10-03 PROCEDURE — 74011000250 HC RX REV CODE- 250: Performed by: INTERNAL MEDICINE

## 2021-10-03 PROCEDURE — 74011250637 HC RX REV CODE- 250/637: Performed by: INTERNAL MEDICINE

## 2021-10-03 PROCEDURE — 94761 N-INVAS EAR/PLS OXIMETRY MLT: CPT

## 2021-10-03 RX ORDER — SODIUM,POTASSIUM PHOSPHATES 280-250MG
1 POWDER IN PACKET (EA) ORAL 4 TIMES DAILY
Status: DISCONTINUED | OUTPATIENT
Start: 2021-10-04 | End: 2021-10-06 | Stop reason: HOSPADM

## 2021-10-03 RX ADMIN — BUDESONIDE AND FORMOTEROL FUMARATE DIHYDRATE 2 PUFF: 160; 4.5 AEROSOL RESPIRATORY (INHALATION) at 20:18

## 2021-10-03 RX ADMIN — FLUTICASONE PROPIONATE 2 SPRAY: 50 SPRAY, METERED NASAL at 08:41

## 2021-10-03 RX ADMIN — APIXABAN 2.5 MG: 2.5 TABLET, FILM COATED ORAL at 08:38

## 2021-10-03 RX ADMIN — BUDESONIDE AND FORMOTEROL FUMARATE DIHYDRATE 2 PUFF: 160; 4.5 AEROSOL RESPIRATORY (INHALATION) at 08:00

## 2021-10-03 RX ADMIN — OXYCODONE HYDROCHLORIDE AND ACETAMINOPHEN 1 TABLET: 5; 325 TABLET ORAL at 08:41

## 2021-10-03 RX ADMIN — APIXABAN 2.5 MG: 2.5 TABLET, FILM COATED ORAL at 17:50

## 2021-10-03 RX ADMIN — DICYCLOMINE HYDROCHLORIDE 10 MG: 10 CAPSULE ORAL at 12:48

## 2021-10-03 RX ADMIN — Medication 10 ML: at 23:23

## 2021-10-03 RX ADMIN — ONDANSETRON 4 MG: 4 TABLET, ORALLY DISINTEGRATING ORAL at 08:41

## 2021-10-03 RX ADMIN — TAMSULOSIN HYDROCHLORIDE 0.4 MG: 0.4 CAPSULE ORAL at 08:37

## 2021-10-03 RX ADMIN — FAMOTIDINE 20 MG: 20 TABLET ORAL at 08:37

## 2021-10-03 RX ADMIN — FAMOTIDINE 20 MG: 20 TABLET ORAL at 17:50

## 2021-10-03 RX ADMIN — VANCOMYCIN HYDROCHLORIDE 250 MG: KIT at 23:23

## 2021-10-03 RX ADMIN — CYANOCOBALAMIN TAB 500 MCG 500 MCG: 500 TAB at 08:38

## 2021-10-03 RX ADMIN — ATORVASTATIN CALCIUM 10 MG: 10 TABLET, FILM COATED ORAL at 23:23

## 2021-10-03 RX ADMIN — VANCOMYCIN HYDROCHLORIDE 250 MG: KIT at 06:17

## 2021-10-03 RX ADMIN — POTASSIUM PHOSPHATE, MONOBASIC AND POTASSIUM PHOSPHATE, DIBASIC: 224; 236 INJECTION, SOLUTION, CONCENTRATE INTRAVENOUS at 12:22

## 2021-10-03 RX ADMIN — Medication 10 ML: at 06:17

## 2021-10-03 RX ADMIN — VANCOMYCIN HYDROCHLORIDE 250 MG: KIT at 12:48

## 2021-10-03 RX ADMIN — POTASSIUM CHLORIDE, DEXTROSE MONOHYDRATE AND SODIUM CHLORIDE 100 ML/HR: 150; 5; 450 INJECTION, SOLUTION INTRAVENOUS at 08:37

## 2021-10-03 RX ADMIN — VANCOMYCIN HYDROCHLORIDE 250 MG: KIT at 17:50

## 2021-10-03 NOTE — PROGRESS NOTES
Hospitalist Progress Note             Date of Service:  10/3/2021  NAME:  Evelyn Mitchell  :  1934  MRN:  [id number]    Assessment & Plan:    C dif colitis  - remains extremely uncomfortable  - cont po vanco,   - contact isolation  - diarrhea improving     Metabolic acidosis  - lactic acid not elevated, resolved today, seemingly with ivf    Hypophos, severe  - iv replacement today, can start po in am  - repeat labs in am  - this is likely what is making her feel miserable     COPD (chronic obstructive pulmonary disease) (HCC)  Continue Brovana and Proventil     Dehydration  Poor p.o. intake per daughter  Patient very thin emaciated        Diverticulitis- no not suspect active diverticulitis  CT scan tonight showed mild diverticulitis  - dc all abx, this will only worsen cdif     Essential hypertension  holding Coreg, Lasix, lisinopril- borderline low bp     Lung mass  Patient and daughter do not want any advanced measures for this lung mass considered malignant        Paroxysmal atrial fibrillation ([facility] [address]  Patient on Eliquis  hodling Coreg  Patient has an AICD    Daughter updated by phone 1030am    Hospital Problems  Date Reviewed: 2021        Codes Class Noted POA    COPD (chronic obstructive pulmonary disease) ([facility] [address] ICD-10-CM: J44.9  ICD-9-CM: 782  10/1/2021 Unknown        Diverticulitis ICD-10-CM: D39.95  ICD-9-CM: 562.11  10/1/2021 Unknown        Dehydration ICD-10-CM: E86.0  ICD-9-CM: 276.51  10/1/2021 Unknown        Physical deconditioning ICD-10-CM: R53.81  ICD-9-CM: 799.3  10/1/2021 Unknown        Clostridium difficile colitis ICD-10-CM: A04.72  ICD-9-CM: 008.45  10/1/2021 Unknown        Paroxysmal atrial fibrillation ([facility] [address] ICD-10-CM: I48.0  ICD-9-CM: 427.31  2021 Yes        Abdominal pain ICD-10-CM: R10.9  ICD-9-CM: 789.00  2021 Unknown        Lung mass ICD-10-CM: R91.8  ICD-9-CM: 786.6  2021 Yes Essential hypertension ICD-10-CM: I10  ICD-9-CM: 401.9  Unknown Yes    Overview Signed 9/1/2020 12:03 PM by Turner Liao     04- visitBP stable. continue meds  -Discussed checking BP at home occasionally and recording for f/u visit.  -Patient is aware to call our office if BP is greater than 150/90 mmHg or less than 100/60 mmHg.  -Discussed low salt dieting and exercising 150 minutes/week. Last modified by DELORES Rubio  10-, 09:13                     Review of Systems:   A comprehensive review of systems was negative except for that written in the HPI. Vital Signs:    Last 24hrs VS reviewed since prior progress note. Most recent are:  Visit Vitals  BP (!) 115/42 (BP 1 Location: Right upper arm, BP Patient Position: At rest)   Pulse (!) 57   Temp 97.1 °F (36.2 °C)   Resp 19   Ht 5' 3\" (1.6 m)   Wt 59.8 kg (131 lb 14.4 oz)   SpO2 95%   BMI 23.37 kg/m²       No intake or output data in the 24 hours ending 10/03/21 1028     Physical Examination:             General:          Alert, cooperative, mild distress, appears stated age. HEENT:           Atraumatic, anicteric sclerae, pink conjunctivae                          No oral ulcers, mucosa moist, throat clear, dentition fair  Neck:               Supple, symmetrical  Lungs:             Clear to auscultation bilaterally. No Wheezing or Rhonchi. No rales. Chest wall:      No tenderness  No Accessory muscle use. Heart:              Regular  rhythm,  No  murmur   No edema  Abdomen:        Soft, +tender. Not distended. Bowel sounds normal  Extremities:     No cyanosis. No clubbing,                            Skin turgor normal, Capillary refill normal  Skin:                Not pale. Not Jaundiced  No rashes   Psych:             Not anxious or agitated.   Neurologic:      Alert, moves all extremities, answers questions appropriately and responds to commands        Data Review:    Review and/or order of clinical lab test  Review and/or order of tests in the radiology section of CPT  Review and/or order of tests in the medicine section of CPT      Labs:     Recent Labs     10/03/21  0359 10/02/21  0450   WBC 12.8 15.6*   HGB 10.0* 10.4*   HCT 32.2* 34.0*    306     Recent Labs     10/03/21  0359 10/02/21  0900 10/02/21  0450 10/02/21  0449    139  --  140   K 3.5 3.1*  --  3.4    107  --  109   CO2 22 18*  --  16*   BUN 4* 8*  --  8*   CREA 0.50* 0.60*  --  0.60*    71  --  50*   CA 7.2* 7.3*  --  7.4*   MG 1.7  --   --  1.8   PHOS 1.1*  --  2.2*  --      Recent Labs     10/02/21  0449 09/30/21  2300   ALT 12 15   AP 40 41   TBILI 1.0 0.8   TP 6.2 6.9   ALB 2.6* 2.9*   GLOB 3.6 4.0   AML  --  29*   LPSE  --  23     No results for input(s): INR, PTP, APTT, INREXT in the last 72 hours. No results for input(s): FE, TIBC, PSAT, FERR in the last 72 hours. No results found for: FOL, RBCF   Recent Labs     10/02/21  0745   PH 7.41   PCO2 26*   PO2 78*     No results for input(s): CPK, CKNDX, TROIQ in the last 72 hours.     No lab exists for component: CPKMB  Lab Results   Component Value Date/Time    Cholesterol, total 147 03/18/2021 11:27 AM    HDL Cholesterol 82 03/18/2021 11:27 AM    LDL, calculated 45.6 03/18/2021 11:27 AM    Triglyceride 97 03/18/2021 11:27 AM    CHOL/HDL Ratio 1.8 03/18/2021 11:27 AM     Lab Results   Component Value Date/Time    Glucose (POC) 82 10/02/2021 06:41 AM     No results found for: COLOR, APPRN, SPGRU, REFSG, ANDREY, PROTU, GLUCU, KETU, BILU, UROU, SUSAN, LEUKU, GLUKE, EPSU, BACTU, WBCU, RBCU, CASTS, UCRY      Medications Reviewed:     Current Facility-Administered Medications   Medication Dose Route Frequency    potassium phosphate 30 mmol in 0.9% sodium chloride 250 mL infusion   IntraVENous ONCE    [START ON 10/4/2021] potassium, sodium phosphates (NEUTRA-PHOS) packet 1 Packet  1 Packet Oral QID    dextrose 5% - 0.45% NaCl with KCl 20 mEq/L infusion  100 mL/hr IntraVENous CONTINUOUS    oxyCODONE-acetaminophen (PERCOCET) 5-325 mg per tablet 1 Tablet  1 Tablet Oral Q6H PRN    sodium chloride (NS) flush 5-40 mL  5-40 mL IntraVENous Q8H    sodium chloride (NS) flush 5-40 mL  5-40 mL IntraVENous PRN    acetaminophen (TYLENOL) tablet 650 mg  650 mg Oral Q6H PRN    Or    acetaminophen (TYLENOL) suppository 650 mg  650 mg Rectal Q6H PRN    polyethylene glycol (MIRALAX) packet 17 g  17 g Oral DAILY PRN    ondansetron (ZOFRAN ODT) tablet 4 mg  4 mg Oral Q8H PRN    Or    ondansetron (ZOFRAN) injection 4 mg  4 mg IntraVENous Q6H PRN    albuterol (PROVENTIL HFA, VENTOLIN HFA, PROAIR HFA) inhaler 2 Puff  2 Puff Inhalation Q6H PRN    apixaban (ELIQUIS) tablet 2.5 mg  2.5 mg Oral BID    [Held by provider] carvediloL (COREG) tablet 3.125 mg  3.125 mg Oral BID WITH MEALS    cyanocobalamin (VITAMIN B12) tablet 500 mcg  500 mcg Oral DAILY    dicyclomine (BENTYL) capsule 10 mg  10 mg Oral Q6H PRN    famotidine (PEPCID) tablet 20 mg  20 mg Oral BID    fluticasone propionate (FLONASE) 50 mcg/actuation nasal spray 2 Spray  2 Spray Both Nostrils DAILY    [Held by provider] furosemide (LASIX) tablet 40 mg  40 mg Oral DAILY    linaCLOtide (LINZESS) caspule 72 mcg- PT MUST BRING FROM HOME (Patient Supplied)  72 mcg Oral ACB    [Held by provider] lisinopriL (PRINIVIL, ZESTRIL) tablet 20 mg  20 mg Oral DAILY    multivitamin (ONE A DAY) tablet 1 Tablet  1 Tablet Oral DAILY    atorvastatin (LIPITOR) tablet 10 mg  10 mg Oral QHS    tamsulosin (FLOMAX) capsule 0.4 mg  0.4 mg Oral DAILY    budesonide-formoteroL (SYMBICORT) 160-4.5 mcg/actuation HFA inhaler 2 Puff  2 Puff Inhalation BID RT    vancomycin (FIRVANQ) 50 mg/mL oral solution 250 mg  250 mg Oral Q6H     ______________________________________________________________________  EXPECTED LENGTH OF STAY: 2d 21h  ACTUAL LENGTH OF STAY:          2                 Mercy Sosa MD

## 2021-10-03 NOTE — PROGRESS NOTES
Problem: Falls - Risk of  Goal: *Absence of Falls  Description: Document Yuliya Heart Fall Risk and appropriate interventions in the flowsheet. Outcome: Progressing Towards Goal  Note: Fall Risk Interventions:  Mobility Interventions: Bed/chair exit alarm         Medication Interventions: Patient to call before getting OOB    Elimination Interventions: Bed/chair exit alarm, Call light in reach              Problem: Patient Education: Go to Patient Education Activity  Goal: Patient/Family Education  Outcome: Progressing Towards Goal     Problem: Risk for Spread of Infection  Goal: Prevent transmission of infectious organism to others  Description: Prevent the transmission of infectious organisms to other patients, staff members, and visitors. Outcome: Progressing Towards Goal     Problem: Patient Education:  Go to Education Activity  Goal: Patient/Family Education  Outcome: Progressing Towards Goal     Problem: Patient Education: Go to Patient Education Activity  Goal: Patient/Family Education  Outcome: Progressing Towards Goal     Problem: Nutrition Deficit  Goal: *Optimize nutritional status  Outcome: Progressing Towards Goal     Problem: Pressure Injury - Risk of  Goal: *Prevention of pressure injury  Description: Document Jeffrey Scale and appropriate interventions in the flowsheet.   Outcome: Progressing Towards Goal  Note: Pressure Injury Interventions:  Sensory Interventions: Assess changes in LOC    Moisture Interventions: Minimize layers    Activity Interventions: Assess need for specialty bed    Mobility Interventions: Assess need for specialty bed    Nutrition Interventions: Document food/fluid/supplement intake, Offer support with meals,snacks and hydration    Friction and Shear Interventions: Apply protective barrier, creams and emollients                Problem: Patient Education: Go to Patient Education Activity  Goal: Patient/Family Education  Outcome: Progressing Towards Goal

## 2021-10-03 NOTE — PROGRESS NOTES
Patient assessed, vital signs obtained, Patient changed twice throughout shift upon patient request. Very small amount of green stool in each, new brief, and linen given. New IV given since last one . CBWR, No further needs at this time.

## 2021-10-04 LAB
ANION GAP SERPL CALC-SCNC: 5 MMOL/L
BASOPHILS # BLD: 0 K/UL (ref 0–0.1)
BASOPHILS NFR BLD: 0 % (ref 0–2)
BUN SERPL-MCNC: 3 MG/DL (ref 9–21)
BUN/CREAT SERPL: 8
CA-I BLD-MCNC: 6.9 MG/DL (ref 8.5–10.5)
CHLORIDE SERPL-SCNC: 112 MMOL/L (ref 94–111)
CO2 SERPL-SCNC: 24 MMOL/L (ref 21–33)
CREAT SERPL-MCNC: 0.4 MG/DL (ref 0.7–1.2)
DIFFERENTIAL METHOD BLD: ABNORMAL
EOSINOPHIL # BLD: 0.2 K/UL (ref 0–0.4)
EOSINOPHIL NFR BLD: 3 % (ref 0–5)
ERYTHROCYTE [DISTWIDTH] IN BLOOD BY AUTOMATED COUNT: 16 % (ref 11.6–14.5)
GLUCOSE SERPL-MCNC: 83 MG/DL (ref 70–110)
HCT VFR BLD AUTO: 29.7 % (ref 35–45)
HGB BLD-MCNC: 9.1 G/DL (ref 12–16)
IMM GRANULOCYTES # BLD AUTO: 0 K/UL (ref 0–0.04)
IMM GRANULOCYTES NFR BLD AUTO: 0 % (ref 0–0.5)
LYMPHOCYTES # BLD: 1.4 K/UL (ref 0.9–3.6)
LYMPHOCYTES NFR BLD: 22 % (ref 21–52)
MAGNESIUM SERPL-MCNC: 1.7 MG/DL (ref 1.7–2.8)
MCH RBC QN AUTO: 31.1 PG (ref 24–34)
MCHC RBC AUTO-ENTMCNC: 30.6 G/DL (ref 31–37)
MCV RBC AUTO: 101.4 FL (ref 78–100)
MONOCYTES # BLD: 0.6 K/UL (ref 0.05–1.2)
MONOCYTES NFR BLD: 8 % (ref 3–10)
NEUTS SEG # BLD: 4.4 K/UL (ref 1.8–8)
NEUTS SEG NFR BLD: 67 % (ref 40–73)
NRBC # BLD: 0 K/UL (ref 0–0.01)
NRBC BLD-RTO: 0 PER 100 WBC
PHOSPHATE SERPL-MCNC: 1.4 MG/DL (ref 2.5–4.5)
PLATELET # BLD AUTO: 309 K/UL (ref 135–420)
PMV BLD AUTO: 10 FL (ref 9.2–11.8)
POTASSIUM SERPL-SCNC: 3.4 MMOL/L (ref 3.2–5.1)
RBC # BLD AUTO: 2.93 M/UL (ref 4.2–5.3)
SODIUM SERPL-SCNC: 141 MMOL/L (ref 135–145)
WBC # BLD AUTO: 6.7 K/UL (ref 4.6–13.2)

## 2021-10-04 PROCEDURE — 36415 COLL VENOUS BLD VENIPUNCTURE: CPT

## 2021-10-04 PROCEDURE — 83735 ASSAY OF MAGNESIUM: CPT

## 2021-10-04 PROCEDURE — 74011250636 HC RX REV CODE- 250/636: Performed by: NURSE PRACTITIONER

## 2021-10-04 PROCEDURE — 74011250637 HC RX REV CODE- 250/637: Performed by: INTERNAL MEDICINE

## 2021-10-04 PROCEDURE — 85025 COMPLETE CBC W/AUTO DIFF WBC: CPT

## 2021-10-04 PROCEDURE — 84100 ASSAY OF PHOSPHORUS: CPT

## 2021-10-04 PROCEDURE — 74011250637 HC RX REV CODE- 250/637: Performed by: NURSE PRACTITIONER

## 2021-10-04 PROCEDURE — 94761 N-INVAS EAR/PLS OXIMETRY MLT: CPT

## 2021-10-04 PROCEDURE — 97530 THERAPEUTIC ACTIVITIES: CPT

## 2021-10-04 PROCEDURE — 94640 AIRWAY INHALATION TREATMENT: CPT

## 2021-10-04 PROCEDURE — 65270000029 HC RM PRIVATE

## 2021-10-04 PROCEDURE — 77010033678 HC OXYGEN DAILY

## 2021-10-04 PROCEDURE — 80048 BASIC METABOLIC PNL TOTAL CA: CPT

## 2021-10-04 RX ORDER — THERA TABS 400 MCG
1 TAB ORAL DAILY
Status: DISCONTINUED | OUTPATIENT
Start: 2021-10-04 | End: 2021-10-06 | Stop reason: HOSPADM

## 2021-10-04 RX ADMIN — POTASSIUM & SODIUM PHOSPHATES POWDER PACK 280-160-250 MG 1 PACKET: 280-160-250 PACK at 08:36

## 2021-10-04 RX ADMIN — DICYCLOMINE HYDROCHLORIDE 10 MG: 10 CAPSULE ORAL at 22:06

## 2021-10-04 RX ADMIN — THERA TABS 1 TABLET: TAB at 13:59

## 2021-10-04 RX ADMIN — Medication 10 ML: at 05:06

## 2021-10-04 RX ADMIN — ONDANSETRON 4 MG: 4 TABLET, ORALLY DISINTEGRATING ORAL at 17:45

## 2021-10-04 RX ADMIN — TAMSULOSIN HYDROCHLORIDE 0.4 MG: 0.4 CAPSULE ORAL at 08:36

## 2021-10-04 RX ADMIN — VANCOMYCIN HYDROCHLORIDE 250 MG: KIT at 05:05

## 2021-10-04 RX ADMIN — FLUTICASONE PROPIONATE 2 SPRAY: 50 SPRAY, METERED NASAL at 09:03

## 2021-10-04 RX ADMIN — BUDESONIDE AND FORMOTEROL FUMARATE DIHYDRATE 2 PUFF: 160; 4.5 AEROSOL RESPIRATORY (INHALATION) at 20:17

## 2021-10-04 RX ADMIN — CYANOCOBALAMIN TAB 500 MCG 500 MCG: 500 TAB at 08:36

## 2021-10-04 RX ADMIN — POTASSIUM & SODIUM PHOSPHATES POWDER PACK 280-160-250 MG 1 PACKET: 280-160-250 PACK at 17:36

## 2021-10-04 RX ADMIN — FAMOTIDINE 20 MG: 20 TABLET ORAL at 08:36

## 2021-10-04 RX ADMIN — POTASSIUM & SODIUM PHOSPHATES POWDER PACK 280-160-250 MG 1 PACKET: 280-160-250 PACK at 22:06

## 2021-10-04 RX ADMIN — ATORVASTATIN CALCIUM 10 MG: 10 TABLET, FILM COATED ORAL at 22:07

## 2021-10-04 RX ADMIN — POTASSIUM & SODIUM PHOSPHATES POWDER PACK 280-160-250 MG 1 PACKET: 280-160-250 PACK at 13:59

## 2021-10-04 RX ADMIN — APIXABAN 2.5 MG: 2.5 TABLET, FILM COATED ORAL at 17:35

## 2021-10-04 RX ADMIN — BUDESONIDE AND FORMOTEROL FUMARATE DIHYDRATE 2 PUFF: 160; 4.5 AEROSOL RESPIRATORY (INHALATION) at 08:00

## 2021-10-04 RX ADMIN — VANCOMYCIN HYDROCHLORIDE 250 MG: KIT at 17:36

## 2021-10-04 RX ADMIN — APIXABAN 2.5 MG: 2.5 TABLET, FILM COATED ORAL at 08:36

## 2021-10-04 RX ADMIN — FAMOTIDINE 20 MG: 20 TABLET ORAL at 17:35

## 2021-10-04 RX ADMIN — VANCOMYCIN HYDROCHLORIDE 250 MG: KIT at 14:04

## 2021-10-04 RX ADMIN — VANCOMYCIN HYDROCHLORIDE 250 MG: KIT at 22:07

## 2021-10-04 NOTE — PROGRESS NOTES
Problem: Mobility Impaired (Adult and Pediatric)  Goal: *Acute Goals and Plan of Care (Insert Text)  Description: Physical Therapy Goals  Initiated 10/1/2021 and to be accomplished within 7 day(s)  1. Patient will move from supine to sit and sit to supine , scoot up and down, and roll side to side in bed with minimal assistance/contact guard assist.    2.  Patient will transfer from bed to chair and chair to bed with minimal assistance/contact guard assist using the least restrictive device. 3.  Patient will perform sit to stand with minimal assistance/contact guard assist.  4.  Patient will ambulate with minimal assistance/contact guard assist for 50 feet with the least restrictive device. 5.  Patient will ascend/descend 3 stairs with 2 handrail(s) with minimal assistance/contact guard assist.    PLOF: Limited community ambulator who used a RW or cane. She was (I) with ADLs. Outcome: Progressing Towards Goal   PHYSICAL THERAPY TREATMENT     Patient: Jacquelyn Quinonez (17 y.o. female)  Date: 10/4/2021  Primary Diagnosis: Diverticulitis [K57.92]  COPD (chronic obstructive pulmonary disease) ([facility] [address] [J44.9]  Dehydration [E86.0]  Abdominal pain [R10.9]  Clostridium difficile colitis [A04.72]        Precautions: Other (comment) (Monitor SpO2 )    ASSESSMENT :  Pt is more motivated this session. Pt comes to sitting without difficulty. When sitting EOB Pt reported feeling dizzy. After this subsided Pt was able to perform SPT to chair followed by 2 sit <> stand transfers to. Pt  Reported she is \"weezing\" after getting into chair. Noted moderate dyspnea with transfer training. Pt reported she is unabl to walk without her ortho shoes. Encouraged Pt to get her family to bring them. See below for treatment details. Progression toward goals: Progressing with transfer goals.    [x]      Improving appropriately and progressing toward goals  []      Improving slowly and progressing toward goals  []      Not making progress toward goals and plan of care will be adjusted       PLAN :  Patient continues to benefit from skilled intervention to address the above impairments. Continue treatment per established plan of care. Frequency/Duration: Patient will be followed by physical therapy 1-2 times per day/4-7 days per week to address goals. Discharge Recommendations: Home Health, Home Physical Therapy, or Skilled Nursing Facility  Further Equipment Recommendations for Discharge: N/A     SUBJECTIVE:   Patient stated my stomach has hurt to bad to get out of bed until today. OBJECTIVE DATA SUMMARY:     Functional Mobility Training:  Bed Mobility:  Rolling: Contact guard assistance  Supine to Sit: Contact guard assistance     Scooting: Contact guard assistance    Transfers:  Sit to Stand: Minimum assistance  Stand to Sit: Minimum assistance  Stand Pivot Transfers: Minimum assistance     Bed to Chair: Minimum assistance  Sit <> Stand 3x and SPT x1  Balance:  Sitting: Intact  Standing: Intact       After treatment:   []         Patient left in no apparent distress sitting up in chair  [x]         Patient left in no apparent distress in bed  [x]         Call bell left within reach  [x]         Nursing notified  []         Caregiver present  []         Bed alarm activated  []         SCDs applied    COMMUNICATION/EDUCATION:   [x]         Role of Physical Therapy in the acute care setting. [x]         Fall prevention education was provided and the patient/caregiver indicated understanding. [x]         Patient/family have participated as able in goal setting and plan of care. [x]         Patient/family agree to work toward stated goals and plan of care. []         Patient understands intent and goals of therapy, but is neutral about his/her participation.   []         Patient is unable to participate in goal setting/plan of care: ongoing with therapy staff.  []         Other:    Isak García, PTA   Time Calculation: 21 mins

## 2021-10-04 NOTE — PROGRESS NOTES
Patient's medications administered, brief changed and barrier cream applied. Patient states pain is very minimal today. CBWR, No further needs at this time.

## 2021-10-04 NOTE — PROGRESS NOTES
Comprehensive Nutrition Assessment    Type and Reason for Visit: Consult    Nutrition Recommendations/Plan: currently full liquids diet due to diveriticulosis and +ciff  Provide ensure plus TID until diet can be advanced, add Gelatein 20 TID  When diet can be advanced recommend regular diet to encourage PO intake. Nutrition Assessment:  81 yo female PMH: arthritis, asthma, high cholesterol, HLD, chronic lung mass. poor intake prior to admission, vague abdominal pain found to have diverticulosis and tested + C-diff started on vancomycin. Emaciated frame nutrition consult for physical deconditioning albumin 2.9 starting ensure plus TID as pt currently on full liquids diet. Pt also chronic lung mass hx daughter did not want to treat per MD note. 10/4/2021: Full liquids diet day #3. Needs 2 more days diarrhea is improving, but K+ and phos still low. Pt receiving IV KPhos. Only eating 26-50% of meals and supplement add Gelatein 20 TID to encourage intake with variety. Recent Results (from the past 24 hour(s))   CBC WITH AUTOMATED DIFF    Collection Time: 10/04/21  3:45 AM   Result Value Ref Range    WBC 6.7 4.6 - 13.2 K/uL    RBC 2.93 (L) 4.20 - 5.30 M/uL    HGB 9.1 (L) 12.0 - 16.0 g/dL    HCT 29.7 (L) 35.0 - 45.0 %    .4 (H) 78.0 - 100.0 FL    MCH 31.1 24.0 - 34.0 PG    MCHC 30.6 (L) 31.0 - 37.0 g/dL    RDW 16.0 (H) 11.6 - 14.5 %    PLATELET 924 078 - 455 K/uL    MPV 10.0 9.2 - 11.8 FL    NRBC 0.0 0.0  WBC    ABSOLUTE NRBC 0.00 0.00 - 0.01 K/uL    NEUTROPHILS 67 40 - 73 %    LYMPHOCYTES 22 21 - 52 %    MONOCYTES 8 3 - 10 %    EOSINOPHILS 3 0 - 5 %    BASOPHILS 0 0 - 2 %    IMMATURE GRANULOCYTES 0 0 - 0.5 %    ABS. NEUTROPHILS 4.4 1.8 - 8.0 K/UL    ABS. LYMPHOCYTES 1.4 0.9 - 3.6 K/UL    ABS. MONOCYTES 0.6 0.05 - 1.2 K/UL    ABS. EOSINOPHILS 0.2 0.0 - 0.4 K/UL    ABS. BASOPHILS 0.0 0.0 - 0.1 K/UL    ABS. IMM.  GRANS. 0.0 0.00 - 0.04 K/UL    DF AUTOMATED     MAGNESIUM    Collection Time: 10/04/21 3:45 AM   Result Value Ref Range    Magnesium 1.7 1.7 - 2.8 mg/dL   METABOLIC PANEL, BASIC    Collection Time: 10/04/21  3:45 AM   Result Value Ref Range    Sodium 141 135 - 145 mmol/L    Potassium 3.4 3.2 - 5.1 mmol/L    Chloride 112 (H) 94 - 111 mmol/L    CO2 24 21 - 33 mmol/L    Anion gap 5 mmol/L    Glucose 83 70 - 110 mg/dL    BUN 3 (L) 9 - 21 mg/dL    Creatinine 0.40 (L) 0.70 - 1.20 mg/dL    BUN/Creatinine ratio 8      GFR est AA >60 ml/min/1.73m2    GFR est non-AA >60 ml/min/1.73m2    Calcium 6.9 (L) 8.5 - 10.5 mg/dL   PHOSPHORUS    Collection Time: 10/04/21  3:45 AM   Result Value Ref Range    Phosphorus 1.4 (L) 2.5 - 4.5 mg/dL       Malnutrition Assessment:  Malnutrition Status:  Mild malnutrition (C-diff isolation)    Context:  Acute illness     Findings of the 6 clinical characteristics of malnutrition:   Energy Intake:  7 - 50% or less of est energy requirements for 5 or more days  Weight Loss:  Unable to assess     Body Fat Loss:  Unable to assess,     Muscle Mass Loss:  Unable to assess,    Fluid Accumulation:  No significant fluid accumulation,     Strength:  Not performed         Estimated Daily Nutrient Needs:  Energy (kcal): 8600-5683 kcal/day; Weight Used for Energy Requirements: Admission (54 kg)  Protein (g): 53-64 g/day; Weight Used for Protein Requirements: Admission (1-1.2 g/kg)  Fluid (ml/day): 4325-4167 mL/day; Method Used for Fluid Requirements: 1 ml/kcal      Nutrition Related Findings:  poor intake prior to admission, vague abdominal pain found to have diverticulosis and tested + C-diff started on vancomycin. Emaciated frame consult for physical deconditioning. Pt also chronic lung mass hx daughter did not want to treat per MD note      Wounds:    None       Current Nutrition Therapies:  ADULT DIET Full Liquid  ADULT ORAL NUTRITION SUPPLEMENT Breakfast, Lunch, Dinner; Other Supplement;  Ensure plus    Anthropometric Measures:  · Height:  5' 3\" (160 cm)  · Current Body Wt:  53.1 kg (117 lb)   · Admission Body Wt:  117 lb    · Usual Body Wt:        · Ideal Body Wt:  115 lbs:  101.7 %   · Adjusted Body Weight:   ; Weight Adjustment for: No adjustment   · Adjusted BMI:       · BMI Category:  Underweight (BMI less than 22) age over 72       Nutrition Diagnosis:   · Severe malnutrition, Increased nutrient needs related to catabolic illness, inadequate protein-energy intake, altered GI function as evidenced by diarrhea, BMI, poor intake prior to admission      Nutrition Interventions:   Food and/or Nutrient Delivery: Continue current diet  Nutrition Education and Counseling: Education not appropriate  Coordination of Nutrition Care: Continue to monitor while inpatient    Goals:  Pt to eat > 75% of meals, BM q 1-3 days, BMI 22-25 for adults > 71 yo       Nutrition Monitoring and Evaluation:   Behavioral-Environmental Outcomes:    Food/Nutrient Intake Outcomes: Food and nutrient intake, Supplement intake  Physical Signs/Symptoms Outcomes: Meal time behavior, Weight, GI status, Diarrhea     F/U: 10/8/2021    Discharge Planning:     Too soon to determine     Electronically signed by Hali Spears on 10/4/2021 at 3:51 PM    Contact: SUZAN [phone]

## 2021-10-05 LAB
ANION GAP SERPL CALC-SCNC: 7 MMOL/L
BUN SERPL-MCNC: 4 MG/DL (ref 9–21)
BUN/CREAT SERPL: 13
CA-I BLD-MCNC: 7.5 MG/DL (ref 8.5–10.5)
CHLORIDE SERPL-SCNC: 110 MMOL/L (ref 94–111)
CO2 SERPL-SCNC: 25 MMOL/L (ref 21–33)
CREAT SERPL-MCNC: 0.3 MG/DL (ref 0.7–1.2)
GLUCOSE SERPL-MCNC: 86 MG/DL (ref 70–110)
POTASSIUM SERPL-SCNC: 3.4 MMOL/L (ref 3.2–5.1)
SODIUM SERPL-SCNC: 142 MMOL/L (ref 135–145)

## 2021-10-05 PROCEDURE — 36415 COLL VENOUS BLD VENIPUNCTURE: CPT

## 2021-10-05 PROCEDURE — 97116 GAIT TRAINING THERAPY: CPT

## 2021-10-05 PROCEDURE — 94640 AIRWAY INHALATION TREATMENT: CPT

## 2021-10-05 PROCEDURE — 94761 N-INVAS EAR/PLS OXIMETRY MLT: CPT

## 2021-10-05 PROCEDURE — 80048 BASIC METABOLIC PNL TOTAL CA: CPT

## 2021-10-05 PROCEDURE — 77010033678 HC OXYGEN DAILY

## 2021-10-05 PROCEDURE — 97530 THERAPEUTIC ACTIVITIES: CPT

## 2021-10-05 PROCEDURE — 74011250637 HC RX REV CODE- 250/637: Performed by: NURSE PRACTITIONER

## 2021-10-05 PROCEDURE — 74011250637 HC RX REV CODE- 250/637: Performed by: INTERNAL MEDICINE

## 2021-10-05 PROCEDURE — 65270000029 HC RM PRIVATE

## 2021-10-05 RX ORDER — FAMOTIDINE 20 MG/1
10 TABLET, FILM COATED ORAL 2 TIMES DAILY
Status: DISCONTINUED | OUTPATIENT
Start: 2021-10-05 | End: 2021-10-06 | Stop reason: HOSPADM

## 2021-10-05 RX ADMIN — VANCOMYCIN HYDROCHLORIDE 250 MG: KIT at 06:00

## 2021-10-05 RX ADMIN — POTASSIUM & SODIUM PHOSPHATES POWDER PACK 280-160-250 MG 1 PACKET: 280-160-250 PACK at 13:10

## 2021-10-05 RX ADMIN — VANCOMYCIN HYDROCHLORIDE 250 MG: KIT at 18:20

## 2021-10-05 RX ADMIN — BUDESONIDE AND FORMOTEROL FUMARATE DIHYDRATE 2 PUFF: 160; 4.5 AEROSOL RESPIRATORY (INHALATION) at 18:00

## 2021-10-05 RX ADMIN — FLUTICASONE PROPIONATE 2 SPRAY: 50 SPRAY, METERED NASAL at 09:32

## 2021-10-05 RX ADMIN — CYANOCOBALAMIN TAB 500 MCG 500 MCG: 500 TAB at 09:32

## 2021-10-05 RX ADMIN — APIXABAN 2.5 MG: 2.5 TABLET, FILM COATED ORAL at 18:20

## 2021-10-05 RX ADMIN — VANCOMYCIN HYDROCHLORIDE 250 MG: KIT at 13:10

## 2021-10-05 RX ADMIN — FAMOTIDINE 10 MG: 20 TABLET ORAL at 18:20

## 2021-10-05 RX ADMIN — THERA TABS 1 TABLET: TAB at 09:32

## 2021-10-05 RX ADMIN — CARVEDILOL 3.12 MG: 3.12 TABLET, FILM COATED ORAL at 18:20

## 2021-10-05 RX ADMIN — APIXABAN 2.5 MG: 2.5 TABLET, FILM COATED ORAL at 09:32

## 2021-10-05 RX ADMIN — ATORVASTATIN CALCIUM 10 MG: 10 TABLET, FILM COATED ORAL at 21:34

## 2021-10-05 RX ADMIN — TAMSULOSIN HYDROCHLORIDE 0.4 MG: 0.4 CAPSULE ORAL at 09:32

## 2021-10-05 RX ADMIN — ONDANSETRON 4 MG: 4 TABLET, ORALLY DISINTEGRATING ORAL at 21:34

## 2021-10-05 RX ADMIN — POTASSIUM & SODIUM PHOSPHATES POWDER PACK 280-160-250 MG 1 PACKET: 280-160-250 PACK at 09:32

## 2021-10-05 RX ADMIN — POTASSIUM & SODIUM PHOSPHATES POWDER PACK 280-160-250 MG 1 PACKET: 280-160-250 PACK at 18:20

## 2021-10-05 RX ADMIN — BUDESONIDE AND FORMOTEROL FUMARATE DIHYDRATE 2 PUFF: 160; 4.5 AEROSOL RESPIRATORY (INHALATION) at 08:00

## 2021-10-05 RX ADMIN — POTASSIUM & SODIUM PHOSPHATES POWDER PACK 280-160-250 MG 1 PACKET: 280-160-250 PACK at 21:34

## 2021-10-05 NOTE — PROGRESS NOTES
Received pt A&Ox4. Breath sounds clear. Bowel sounds hyperactive. PP positive. Pt has O2 2LPM; no distress noted. Pt has no c/o pain however; states she feels nauseas. Will review MAR to determine when anti-emetic can be given.

## 2021-10-05 NOTE — PROGRESS NOTES
Problem: Mobility Impaired (Adult and Pediatric)  Goal: *Acute Goals and Plan of Care (Insert Text)  Description: Physical Therapy Goals  Initiated 10/1/2021 and to be accomplished within 7 day(s)  1. Patient will move from supine to sit and sit to supine , scoot up and down, and roll side to side in bed with minimal assistance/contact guard assist.    2.  Patient will transfer from bed to chair and chair to bed with minimal assistance/contact guard assist using the least restrictive device. 3.  Patient will perform sit to stand with minimal assistance/contact guard assist.  4.  Patient will ambulate with minimal assistance/contact guard assist for 50 feet with the least restrictive device. 5.  Patient will ascend/descend 3 stairs with 2 handrail(s) with minimal assistance/contact guard assist.    PLOF: Limited community ambulator who used a RW or cane. She was (I) with ADLs. Outcome: Progressing Towards Goal   PHYSICAL THERAPY TREATMENT     Patient: Varinder Yusuf (8 y.o. female)  Date: 10/5/2021  Primary Diagnosis: Diverticulitis [K57.92]  COPD (chronic obstructive pulmonary disease) ([facility] [address] [J44.9]  Dehydration [E86.0]  Abdominal pain [R10.9]  Clostridium difficile colitis [A04.72]        Precautions: Other (comment) (Monitor SpO2 )    ASSESSMENT :  Pt is more motivated this session. Pt comes to sitting without difficulty. When sitting EOB Pt reported feeling dizzy. Pt reported she needed to go to the bathroom. Pt ambulated to bathroom. Pt needed mod A to stand from toilet then she ambulated back to bed. Moderate dyspnea and need time to recover. See below for treatment details. Progression toward goals: Progressing with transfer goals.    [x]      Improving appropriately and progressing toward goals  []      Improving slowly and progressing toward goals  []      Not making progress toward goals and plan of care will be adjusted       PLAN :  Patient continues to benefit from skilled intervention to address the above impairments. Continue treatment per established plan of care. Frequency/Duration: Patient will be followed by physical therapy 1-2 times per day/4-7 days per week to address goals. Discharge Recommendations: Home Health, Home Physical Therapy, or [facility]  Further Equipment Recommendations for Discharge: N/A     SUBJECTIVE:   Patient stated \"I need to go to the bathroom\"     OBJECTIVE DATA SUMMARY:     Functional Mobility Training:  Bed Mobility:  Rolling: Modified independent  Supine to Sit: Modified independent     Scooting: Modified independent  Transfers:  Sit to Stand: Minimum assistance;Contact guard assistance  Stand to Sit: Minimum assistance;Contact guard assistance  Stand Pivot Transfers: Minimum assistance;Contact guard assistance     Bed to Chair: Minimum assistance   See flow sheet mod A for toilet transfers  Balance:  Sitting: Intact  Standing: Intact  Standing - Static: Good  Standing - Dynamic : Fair;Good    Ambulation/Gait Training:  Distance (ft): 15 Feet (ft) (x2)  Assistive Device: Walker, rolling  Ambulation - Level of Assistance: Contact guard assistance  Gait Abnormalities:  (flexed posture )    After treatment:   []         Patient left in no apparent distress sitting up in chair  [x]         Patient left in no apparent distress in bed  [x]         Call bell left within reach  [x]         Nursing notified  []         Caregiver present  []         Bed alarm activated  []         SCDs applied    COMMUNICATION/EDUCATION:   [x]         Role of Physical Therapy in the acute care setting. [x]         Fall prevention education was provided and the patient/caregiver indicated understanding. [x]         Patient/family have participated as able in goal setting and plan of care. [x]         Patient/family agree to work toward stated goals and plan of care.   []         Patient understands intent and goals of therapy, but is neutral about his/her participation.   []         Patient is unable to participate in goal setting/plan of care: ongoing with therapy staff.  []         Other:    Lisa Santos PTA   Time Calculation: 30 mins

## 2021-10-05 NOTE — PROGRESS NOTES
Hospitalist Progress Note     INTERNAL MEDICINE PROGRESS NOTE  Patient: Kati Arredondo   YOB: 1934   MRN: [id number]      Hospital course / Assessment    Principle Problems:  C. diff colitis  Continue on po vanco, Contact isolation  Leukocytosis has resolved. Still having diarrhea and abdominal pain with a little improvement   States she don't feel comfortable to d/c home today and may be tomorrow if she continue getting better  Metabolic acidosis  Resolved with IVF. Hypophosphatemia  replaced   COPD (chronic obstructive pulmonary disease) (HCC)  Stable, Continue Brovana and Proventil. Dehydration  Poor p.o. intake per daughter. Patient is emaciated.   Diverticulitis- no not suspect active diverticulitis  CT scan showed mild diverticulitis  Discontinued all previous antibiotics due to C.diff colitis   Essential hypertension  holding Coreg, Lasix, lisinopril- borderline low bp - may not need multiple antihypertensive   Will resume Coreg only and continue monitor   Lung mass  Patient and daughter do not want any advanced measures for this lung mass considered malignant  Paroxysmal atrial fibrillation ([facility] [address]  Patient on Eliquis, s/p  AICD, resume coreg         Code Status: DNR/DNI  DVT prophylaxis: pharmacologic and mechanical  Today Recommendation and Plan:   Continue with IVF support  Continue monitor Lytes   Resume coreg and monitor BP   Advance diet   May home in am if symptoms improved on PO vanc course    Disposition    Disposition: home     Subjective / ROS:   Patient states still having abd pain and diarrhea and she prefer to stay for a day or two until she feel better        Medical Decision Making   Chart, Images and Lab data reviewed, necessary medical Orders placed   Discussed with nursing staff     Vitals:    10/04/21 2019 10/04/21 2244 10/05/21 0109 10/05/21 0507   BP:  (!) 141/53 (!) 121/54 (!) 126/54   Pulse:  87 84 86   Resp:  18 18 16   Temp:  98.3 °F (36.8 °C) 98.7 °F (37.1 °C) 97.9 °F (36.6 °C)   SpO2: 98% 96% 97% 96%   Weight:       Height:         Temp (24hrs), Av.1 °F (36.7 °C), Min:97.8 °F (36.6 °C), Max:98.7 °F (37.1 °C)      Intake/Output Summary (Last 24 hours) at 10/5/2021 9550  Last data filed at 10/5/2021 4269  Gross per 24 hour   Intake 600 ml   Output 1 ml   Net 599 ml       Physical Exam:   General Appearance:   Appears in no acute distress.,  HEENT:   Moist oral mucous membranes, conjunctiva clear,   Neck:   Supple  Lungs: No wheezes. , No rales. , Normal respiratory effort,   Heart:   Regular rate and rhythm  Abdomen:   Soft , Non-distended and Non-tender,   Extremities:   - edema of legs  Neuro:   alert, oriented, moves all extremities well    Current medications:     Current Facility-Administered Medications   Medication Dose Route Frequency    therapeutic multivitamin (THERAGRAN) tablet 1 Tablet  1 Tablet Oral DAILY    potassium, sodium phosphates (NEUTRA-PHOS) packet 1 Packet  1 Packet Oral QID    dextrose 5% - 0.45% NaCl with KCl 20 mEq/L infusion  100 mL/hr IntraVENous CONTINUOUS    oxyCODONE-acetaminophen (PERCOCET) 5-325 mg per tablet 1 Tablet  1 Tablet Oral Q6H PRN    sodium chloride (NS) flush 5-40 mL  5-40 mL IntraVENous Q8H    sodium chloride (NS) flush 5-40 mL  5-40 mL IntraVENous PRN    acetaminophen (TYLENOL) tablet 650 mg  650 mg Oral Q6H PRN    Or    acetaminophen (TYLENOL) suppository 650 mg  650 mg Rectal Q6H PRN    polyethylene glycol (MIRALAX) packet 17 g  17 g Oral DAILY PRN    ondansetron (ZOFRAN ODT) tablet 4 mg  4 mg Oral Q8H PRN    Or    ondansetron (ZOFRAN) injection 4 mg  4 mg IntraVENous Q6H PRN    albuterol (PROVENTIL HFA, VENTOLIN HFA, PROAIR HFA) inhaler 2 Puff  2 Puff Inhalation Q6H PRN    apixaban (ELIQUIS) tablet 2.5 mg  2.5 mg Oral BID    [Held by provider] carvediloL (COREG) tablet 3.125 mg  3.125 mg Oral BID WITH MEALS    cyanocobalamin (VITAMIN B12) tablet 500 mcg  500 mcg Oral DAILY    dicyclomine (BENTYL) capsule 10 mg  10 mg Oral Q6H PRN    famotidine (PEPCID) tablet 20 mg  20 mg Oral BID    fluticasone propionate (FLONASE) 50 mcg/actuation nasal spray 2 Spray  2 Spray Both Nostrils DAILY    [Held by provider] furosemide (LASIX) tablet 40 mg  40 mg Oral DAILY    linaCLOtide (LINZESS) caspule 72 mcg- PT MUST BRING FROM HOME (Patient Supplied)  72 mcg Oral ACB    [Held by provider] lisinopriL (PRINIVIL, ZESTRIL) tablet 20 mg  20 mg Oral DAILY    atorvastatin (LIPITOR) tablet 10 mg  10 mg Oral QHS    tamsulosin (FLOMAX) capsule 0.4 mg  0.4 mg Oral DAILY    budesonide-formoteroL (SYMBICORT) 160-4.5 mcg/actuation HFA inhaler 2 Puff  2 Puff Inhalation BID RT    vancomycin (FIRVANQ) 50 mg/mL oral solution 250 mg  250 mg Oral Q6H          Laboratory and Radiology Data :      No results found for: FERR  WBC   Date Value Ref Range Status   10/04/2021 6.7 4.6 - 13.2 K/uL Final     No results found for: REUBEN  No results found for: UEO    Microbiology    No results found for: GMS    Recent Results (from the past 24 hour(s))   METABOLIC PANEL, BASIC    Collection Time: 10/05/21  5:46 AM   Result Value Ref Range    Sodium 142 135 - 145 mmol/L    Potassium 3.4 3.2 - 5.1 mmol/L    Chloride 110 94 - 111 mmol/L    CO2 25 21 - 33 mmol/L    Anion gap 7 mmol/L    Glucose 86 70 - 110 mg/dL    BUN 4 (L) 9 - 21 mg/dL    Creatinine 0.30 (L) 0.70 - 1.20 mg/dL    BUN/Creatinine ratio 13      GFR est AA >60 ml/min/1.73m2    GFR est non-AA >60 ml/min/1.73m2    Calcium 7.5 (L) 8.5 - 10.5 mg/dL       XR Results:  Results from Hospital Encounter encounter on 09/30/21    XR CHEST PORT    Narrative  EXAM: XR CHEST PORT    CLINICAL INDICATION/HISTORY: SOB, Lung mass  -Additional: None    COMPARISON: 9/30/21    TECHNIQUE: Portable frontal view of the chest    _______________    FINDINGS:    SUPPORT DEVICES: None. HEART AND MEDIASTINUM: Cardiomediastinal silhouette within normal limits. LUNGS AND PLEURAL SPACES: Right basilar opacities.  No large effusion or  pneumothorax.    _______________    Impression  Right basilar opacities. CT Results:  Results from Hospital Encounter encounter on 09/30/21    CT ABD PELV W CONT    Narrative  EXAM: CT of the abdomen and pelvis    INDICATION: Pain. COMPARISON: September 11, 2021. TECHNIQUE: Axial CT imaging of the abdomen and pelvis was performed with  intravenous contrast. Multiplanar reformats were generated. Dose reduction  techniques used: automated exposure control, adjustment of the mAs and/or kVp  according to patient size, and iterative reconstruction techniques. Digital  imaging and communications in Medicine (DICOM) format image data are available  to nonaffiliated external healthcare facilities or entities on a secure, media  free, reciprocally searchable basis with patient authorization for at least 12  months after this study. _______________    FINDINGS:    LOWER CHEST: There are small bilateral pleural effusions larger on the right. There is patchy consolidation at the right lung base. There is a small  pericardial effusion. The visualized ascending aorta appears to be ectatic. LIVER, BILIARY: Liver is normal. No biliary dilation. Gallbladder is  unremarkable. PANCREAS: Normal.    SPLEEN: Normal.    ADRENALS: Normal.    KIDNEYS: There are a few small left renal cysts. There is no hydronephrosis. LYMPH NODES: No enlarged lymph nodes. GASTROINTESTINAL TRACT: There is diverticulosis of the descending and sigmoid  colon. There is a long segment of thickened sigmoid colon with a few areas of  surrounding infiltration. PELVIC ORGANS: Unremarkable. VASCULATURE: Unremarkable. BONES: There is lumbosacral degenerative change with curvature to the right. There is a chronic T12 compression fracture    OTHER: None.    _______________    Impression  Descending and sigmoid colon diverticulosis with sigmoid colonic  thickening and a few areas of surrounding infiltration.  Consider mild acute  diverticulitis superimposed on a chronic diverticular process. Small bilateral pleural effusions. Patchy consolidation at the right lung base possibly residual pneumonia as an  infiltrate was seen on previous study. Small pericardial effusion. MRI Results:  No results found for this or any previous visit. Nuclear Medicine Results:  No results found for this or any previous visit. US Results:  No results found for this or any previous visit. IR Results:  No results found for this or any previous visit. VAS/US Results:  No results found for this or any previous visit. Colin Horan M.D.   Hospitalist

## 2021-10-05 NOTE — PROGRESS NOTES
Patient's daughter has decided she would like her mother to go home with [place]. She already has Jonnathan Farrell with [facility] and would like her to continue with them.

## 2021-10-05 NOTE — PROGRESS NOTES
Received pt up in chair. Pt placed back to bed at her request. PP positive. Pt no IV access at this time. Pt states she is in generalized pain but prefers intervention until bedtime. Pt was positioned for comfort.

## 2021-10-06 VITALS
SYSTOLIC BLOOD PRESSURE: 116 MMHG | HEIGHT: 63 IN | DIASTOLIC BLOOD PRESSURE: 49 MMHG | TEMPERATURE: 97.5 F | HEART RATE: 51 BPM | RESPIRATION RATE: 18 BRPM | OXYGEN SATURATION: 98 % | WEIGHT: 132 LBS | BODY MASS INDEX: 23.39 KG/M2

## 2021-10-06 LAB
ANION GAP SERPL CALC-SCNC: 5 MMOL/L
BUN SERPL-MCNC: <1 MG/DL (ref 9–21)
BUN/CREAT SERPL: ABNORMAL
CA-I BLD-MCNC: 7.4 MG/DL (ref 8.5–10.5)
CHLORIDE SERPL-SCNC: 109 MMOL/L (ref 94–111)
CO2 SERPL-SCNC: 25 MMOL/L (ref 21–33)
CREAT SERPL-MCNC: 0.34 MG/DL (ref 0.7–1.2)
GLUCOSE SERPL-MCNC: 95 MG/DL (ref 70–110)
PHOSPHATE SERPL-MCNC: 2.2 MG/DL (ref 2.5–4.5)
POTASSIUM SERPL-SCNC: 3.4 MMOL/L (ref 3.2–5.1)
SODIUM SERPL-SCNC: 139 MMOL/L (ref 135–145)

## 2021-10-06 PROCEDURE — 74011250637 HC RX REV CODE- 250/637: Performed by: NURSE PRACTITIONER

## 2021-10-06 PROCEDURE — 94761 N-INVAS EAR/PLS OXIMETRY MLT: CPT

## 2021-10-06 PROCEDURE — 74011250637 HC RX REV CODE- 250/637: Performed by: INTERNAL MEDICINE

## 2021-10-06 PROCEDURE — 94640 AIRWAY INHALATION TREATMENT: CPT

## 2021-10-06 PROCEDURE — 97116 GAIT TRAINING THERAPY: CPT

## 2021-10-06 PROCEDURE — 97110 THERAPEUTIC EXERCISES: CPT

## 2021-10-06 PROCEDURE — 77010033678 HC OXYGEN DAILY

## 2021-10-06 PROCEDURE — 84100 ASSAY OF PHOSPHORUS: CPT

## 2021-10-06 PROCEDURE — 80048 BASIC METABOLIC PNL TOTAL CA: CPT

## 2021-10-06 PROCEDURE — 36415 COLL VENOUS BLD VENIPUNCTURE: CPT

## 2021-10-06 RX ORDER — POTASSIUM CHLORIDE 750 MG/1
40 TABLET, EXTENDED RELEASE ORAL
Status: COMPLETED | OUTPATIENT
Start: 2021-10-06 | End: 2021-10-06

## 2021-10-06 RX ORDER — VANCOMYCIN HYDROCHLORIDE 250 MG/5ML
250 POWDER, FOR SOLUTION ORAL EVERY 6 HOURS
Qty: 95 ML | Refills: 0 | Status: SHIPPED | OUTPATIENT
Start: 2021-10-06 | End: 2021-10-06

## 2021-10-06 RX ORDER — METRONIDAZOLE 500 MG/1
500 TABLET ORAL 3 TIMES DAILY
Qty: 30 TABLET | Refills: 0 | Status: SHIPPED | OUTPATIENT
Start: 2021-10-06 | End: 2021-10-16

## 2021-10-06 RX ORDER — SODIUM,POTASSIUM PHOSPHATES 280-250MG
1 POWDER IN PACKET (EA) ORAL 4 TIMES DAILY
Qty: 20 PACKET | Refills: 0 | Status: SHIPPED | OUTPATIENT
Start: 2021-10-06 | End: 2021-10-06

## 2021-10-06 RX ORDER — METRONIDAZOLE 250 MG/1
500 TABLET ORAL 3 TIMES DAILY
Status: DISCONTINUED | OUTPATIENT
Start: 2021-10-06 | End: 2021-10-06 | Stop reason: HOSPADM

## 2021-10-06 RX ADMIN — FAMOTIDINE 10 MG: 20 TABLET ORAL at 09:13

## 2021-10-06 RX ADMIN — THERA TABS 1 TABLET: TAB at 09:13

## 2021-10-06 RX ADMIN — CARVEDILOL 3.12 MG: 3.12 TABLET, FILM COATED ORAL at 09:13

## 2021-10-06 RX ADMIN — OXYCODONE HYDROCHLORIDE AND ACETAMINOPHEN 1 TABLET: 5; 325 TABLET ORAL at 14:48

## 2021-10-06 RX ADMIN — DICYCLOMINE HYDROCHLORIDE 10 MG: 10 CAPSULE ORAL at 10:57

## 2021-10-06 RX ADMIN — VANCOMYCIN HYDROCHLORIDE 250 MG: KIT at 12:25

## 2021-10-06 RX ADMIN — APIXABAN 2.5 MG: 2.5 TABLET, FILM COATED ORAL at 09:13

## 2021-10-06 RX ADMIN — FLUTICASONE PROPIONATE 2 SPRAY: 50 SPRAY, METERED NASAL at 09:15

## 2021-10-06 RX ADMIN — POTASSIUM & SODIUM PHOSPHATES POWDER PACK 280-160-250 MG 1 PACKET: 280-160-250 PACK at 12:25

## 2021-10-06 RX ADMIN — POTASSIUM & SODIUM PHOSPHATES POWDER PACK 280-160-250 MG 1 PACKET: 280-160-250 PACK at 09:13

## 2021-10-06 RX ADMIN — VANCOMYCIN HYDROCHLORIDE 250 MG: KIT at 00:12

## 2021-10-06 RX ADMIN — VANCOMYCIN HYDROCHLORIDE 250 MG: KIT at 06:00

## 2021-10-06 RX ADMIN — CYANOCOBALAMIN TAB 500 MCG 500 MCG: 500 TAB at 09:13

## 2021-10-06 RX ADMIN — BUDESONIDE AND FORMOTEROL FUMARATE DIHYDRATE 2 PUFF: 160; 4.5 AEROSOL RESPIRATORY (INHALATION) at 07:07

## 2021-10-06 RX ADMIN — ONDANSETRON 4 MG: 4 TABLET, ORALLY DISINTEGRATING ORAL at 10:57

## 2021-10-06 RX ADMIN — TAMSULOSIN HYDROCHLORIDE 0.4 MG: 0.4 CAPSULE ORAL at 09:13

## 2021-10-06 RX ADMIN — POTASSIUM CHLORIDE 40 MEQ: 750 TABLET, EXTENDED RELEASE ORAL at 09:15

## 2021-10-06 NOTE — PROGRESS NOTES
Problem: Falls - Risk of  Goal: *Absence of Falls  Description: Document Gretta Bare Fall Risk and appropriate interventions in the flowsheet. Outcome: Progressing Towards Goal  Note: Fall Risk Interventions:  Mobility Interventions: Patient to call before getting OOB         Medication Interventions: Patient to call before getting OOB    Elimination Interventions: Bed/chair exit alarm, Call light in reach              Problem: Patient Education: Go to Patient Education Activity  Goal: Patient/Family Education  Outcome: Progressing Towards Goal     Problem: Risk for Spread of Infection  Goal: Prevent transmission of infectious organism to others  Description: Prevent the transmission of infectious organisms to other patients, staff members, and visitors. Outcome: Progressing Towards Goal     Problem: Patient Education:  Go to Education Activity  Goal: Patient/Family Education  Outcome: Progressing Towards Goal     Problem: Patient Education: Go to Patient Education Activity  Goal: Patient/Family Education  Outcome: Progressing Towards Goal     Problem: Nutrition Deficit  Goal: *Optimize nutritional status  Outcome: Progressing Towards Goal     Problem: Pressure Injury - Risk of  Goal: *Prevention of pressure injury  Description: Document Jeffrey Scale and appropriate interventions in the flowsheet.   Outcome: Progressing Towards Goal  Note: Pressure Injury Interventions:  Sensory Interventions: Assess changes in LOC    Moisture Interventions: Minimize layers    Activity Interventions: Assess need for specialty bed    Mobility Interventions: Assess need for specialty bed, PT/OT evaluation    Nutrition Interventions: Document food/fluid/supplement intake, Offer support with meals,snacks and hydration    Friction and Shear Interventions: HOB 30 degrees or less                Problem: Patient Education: Go to Patient Education Activity  Goal: Patient/Family Education  Outcome: Progressing Towards Goal     Problem: Risk for Spread of Infection  Goal: Prevent transmission of infectious organism to others  Description: Prevent the transmission of infectious organisms to other patients, staff members, and visitors.   Outcome: Progressing Towards Goal     Problem: Patient Education:  Go to Education Activity  Goal: Patient/Family Education  Outcome: Progressing Towards Goal

## 2021-10-06 NOTE — PROGRESS NOTES
Problem: Mobility Impaired (Adult and Pediatric)  Goal: *Acute Goals and Plan of Care (Insert Text)  Description: Physical Therapy Goals  Initiated 10/1/2021 and to be accomplished within 7 day(s)  1. Patient will move from supine to sit and sit to supine , scoot up and down, and roll side to side in bed with minimal assistance/contact guard assist.    2.  Patient will transfer from bed to chair and chair to bed with minimal assistance/contact guard assist using the least restrictive device. 3.  Patient will perform sit to stand with minimal assistance/contact guard assist.  4.  Patient will ambulate with minimal assistance/contact guard assist for 50 feet with the least restrictive device. 5.  Patient will ascend/descend 3 stairs with 2 handrail(s) with minimal assistance/contact guard assist.    PLOF: Limited community ambulator who used a RW or cane. She was (I) with ADLs. Outcome: Progressing Towards Goal   PHYSICAL THERAPY TREATMENT     Patient: Lorna Granados (41 y.o. female)  Date: 10/6/2021  Primary Diagnosis: Diverticulitis [K57.92]  COPD (chronic obstructive pulmonary disease) ([facility] [address] [J44.9]  Dehydration [E86.0]  Abdominal pain [R10.9]  Clostridium difficile colitis [A04.72]        Precautions: Other (comment) (Monitor SpO2 )    ASSESSMENT :  Pt continues to be motivated. She performed one bout of ambulation in room. Then needed increased time to recover. Noted moderate dyspnea after ambulation. After this Pt participated in LE seated exercise. See below for treatment details. Progression toward goals: Unable to leave room for step training due to cdiff precautions  [x]      Improving appropriately and progressing toward goals  []      Improving slowly and progressing toward goals  []      Not making progress toward goals and plan of care will be adjusted       PLAN :  Patient continues to benefit from skilled intervention to address the above impairments.   Continue treatment per established plan of care. Frequency/Duration: Patient will be followed by physical therapy 1-2 times per day/4-7 days per week to address goals. Discharge Recommendations: Home Health, Home Physical Therapy, or Skilled Nursing Facility  Further Equipment Recommendations for Discharge: N/A     SUBJECTIVE:   Patient stated \"I feel weak\". Pt reported having stomach pain and feeling noxious. OBJECTIVE DATA SUMMARY:     Functional Mobility Training:  Bed Mobility:  Rolling: Modified independent  Supine to Sit: Modified independent     Scooting: Modified independent  Transfers:  Sit to Stand: Contact guard assistance  Stand to Sit: Contact guard assistance  Stand Pivot Transfers: Contact guard assistance     Bed to Chair: Contact guard assistance  Balance:  Sitting: Intact  Standing: Intact  Standing - Static: Good  Standing - Dynamic : Good    Ambulation/Gait Training:  Distance (ft): 20 Feet (ft)  Assistive Device: Walker, rolling  Ambulation - Level of Assistance: Contact guard assistance;Stand-by assistance  Gait Abnormalities: Other; Antalgic (flexed posture )        EXERCISE   Sets   Reps   Active Active Assist   Passive Self ROM   Comments   HR/TR 1 30   [] [] []    Seated march 2 10  [x] [] [] []    Hip ABD 1 30 [x] [] [] []    Long Arc Quads 2 10 [x] [] [] []        After treatment:   [x]         Patient left in no apparent distress sitting up in chair  []         Patient left in no apparent distress in bed  [x]         Call bell left within reach  [x]         Nursing notified  []         Caregiver present  []         Bed alarm activated  []         SCDs applied    COMMUNICATION/EDUCATION:   [x]         Role of Physical Therapy in the acute care setting. [x]         Fall prevention education was provided and the patient/caregiver indicated understanding. [x]         Patient/family have participated as able in goal setting and plan of care.   [x]         Patient/family agree to work toward stated goals and plan of care. []         Patient understands intent and goals of therapy, but is neutral about his/her participation.   []         Patient is unable to participate in goal setting/plan of care: ongoing with therapy staff.  []         Other:    Mariam Jeong, PTA   Time Calculation: 35 mins

## 2021-10-06 NOTE — DISCHARGE INSTRUCTIONS

## 2021-10-06 NOTE — DISCHARGE SUMMARY
Hospitalist Discharge Summary     Patient ID:    Ashlee Lizarraga  [id number]  8 y.o.  1934    Admit date: 9/30/2021    Discharge date : 10/6/2021    Chronic Diagnoses:    Problem List as of 10/6/2021 Date Reviewed: 6/21/2021        Codes Class Noted - Resolved    COPD (chronic obstructive pulmonary disease) ([address] ICD-10-CM: J44.9  ICD-9-CM: 626  10/1/2021 - Present        Diverticulitis ICD-10-CM: K57.92  ICD-9-CM: 562.11  10/1/2021 - Present        Dehydration ICD-10-CM: E86.0  ICD-9-CM: 276.51  10/1/2021 - Present        Physical deconditioning ICD-10-CM: R53.81  ICD-9-CM: 799.3  10/1/2021 - Present        Clostridium difficile colitis ICD-10-CM: A04.72  ICD-9-CM: 008.45  10/1/2021 - Present        Colitis ICD-10-CM: K52.9  ICD-9-CM: 558.9  9/6/2021 - Present        Urinary retention ICD-10-CM: R33.9  ICD-9-CM: 788.20  9/6/2021 - Present        Paroxysmal atrial fibrillation ([address] ICD-10-CM: I48.0  ICD-9-CM: 427.31  9/6/2021 - Present        Abdominal pain ICD-10-CM: R10.9  ICD-9-CM: 789.00  9/6/2021 - Present        Lung mass ICD-10-CM: R91.8  ICD-9-CM: 786.6  9/6/2021 - Present        Hypokalemia ICD-10-CM: E87.6  ICD-9-CM: 276.8  9/6/2021 - Present        Allergic rhinitis ICD-10-CM: J30.9  ICD-9-CM: 477.9  Unknown - Present    Overview Signed 9/1/2020 11:55 AM by Jamal Tay     04- visitStable. No flare-ups. Last modified by Fabricio Vallejo  10-, 09:13             Chronic obstructive lung disease ([address] ICD-10-CM: J44.9  ICD-9-CM: 219  Unknown - Present    Overview Signed 9/1/2020 12:00 PM by Jamal Tay     11- visitsees pulmonology (Dr. Ronnie Jon) for for COPD.  no signs of exacerbation.     REQUESTING ORFDER FOR CXR ( MISSED THE ORDER DONE BY DR Lexy Arora FOR FU LEONARDO)  Last modified by [email or web address]  10-, 09:13             Congestive heart failure (CHF) (HCC) ICD-10-CM: I50.9  ICD-9-CM: 428.0  Unknown - Present    Overview Signed 9/1/2020 12:01 PM by Cristobal Zambrano     With acid    08- visitSees cardiology. No issues. Last modified by LYNDON Billingsley-BC  10-, 09:13             Essential hypertension ICD-10-CM: I10  ICD-9-CM: 401.9  Unknown - Present    Overview Signed 9/1/2020 12:03 PM by Cristobal Zambrano     04- visitBP stable. continue meds  -Discussed checking BP at home occasionally and recording for f/u visit.  -Patient is aware to call our office if BP is greater than 150/90 mmHg or less than 100/60 mmHg.  -Discussed low salt dieting and exercising 150 minutes/week. Last modified by LYNDON BillingsleyBC  10-, 09:13             Gastroesophageal reflux disease ICD-10-CM: K21.9  ICD-9-CM: 530.81  Unknown - Present    Overview Signed 9/1/2020 12:03 PM by Cristobal Zambrano     10- visitnot controlled  change Zantac to Pepcid  Last modified by LYNDON Billingsley-BC  10-, 09:13             Hyperlipidemia ICD-10-CM: E78.5  ICD-9-CM: 272.4  Unknown - Present        Myocardial infarction ([facility] ICD-10-CM: I21.9  ICD-9-CM: 410.90  Unknown - Present        Osteopenia ICD-10-CM: M85.80  ICD-9-CM: 733.90  Unknown - Present    Overview Signed 9/1/2020 12:05 PM by Cristobal Zambrano     Takes calcium and vitamin D             Chronic constipation ICD-10-CM: K59.09  ICD-9-CM: 564.00  10/22/2018 - Present        Diverticular disease ICD-10-CM: K57.90  ICD-9-CM: 562.10  9/4/2018 - Present        Pulmonary hypertension ([facility] [address] [facility] ICD-10-CM: I27.20  ICD-9-CM: 416.8  9/4/2018 - Present        Coronary arteriosclerosis ICD-10-CM: I25.10  ICD-9-CM: 414.00  8/30/2018 - Present        Cardiac pacemaker in situ ICD-10-CM: Z95.0  ICD-9-CM: V45.01  1/1/2007 - Present    Overview Signed 9/1/2020 11:55 AM by Cristobal Zambrano     04- visitSays she just had a new pacemaker put in back in August 2018. Sees cardiology routinely. Entered by Judy Kinsey  08-, 09:49               22    Final Diagnoses:    Active Problems:    Essential hypertension ()      Overview: 04- visitBP stable. continue meds      -Discussed checking BP at home occasionally and recording for f/u visit.      -Patient is aware to call our office if BP is greater than 150/90 mmHg or       less than 100/60 mmHg.      -Discussed low salt dieting and exercising 150 minutes/week. Last modified by ESTELLE CifuentesP-BC  10-, 09:13      Paroxysmal atrial fibrillation ([facility] (9/6/2021)      Abdominal pain (9/6/2021)      Lung mass (9/6/2021)      COPD (chronic obstructive pulmonary disease) ([facility] (10/1/2021)      Diverticulitis (10/1/2021)      Dehydration (10/1/2021)      Physical deconditioning (10/1/2021)      Clostridium difficile colitis (10/1/2021)        Reason for Hospitalization:      Dionne Baez 80year-old female with a past medical history for coronary artery disease, hypercholesteremia, hyperlipidemia, diverticulitis, asthma, arthritis, and oxygen dependent 2 L nocturnally, patient presented to the ED with abdominal pain and was found to have a Clostridioides difficile infection and CT abdomen showing mild diverticulitis. Patient at the time was started on oral Vancomycin and changed to oral Flagyl and provided PRN Bentyl for abdominal pain. At this time abdominal pain and diarrhea has improved, leukocytosis has improved, patient is stable for discharge, and will be discharged on oral Flagyl 500 mg TID for 10 days. Discharge home today with home health.       C. difficile colitis  -History of diverticulitis, CTA showed mild diverticulitis  -Vancomycin orally changed to Flagyl due to patient insurance not covering Vancomycin and Flagyl being more cost effective for the patient  -Current WBC 6.7  -Abdominal pain and diarrhea improved  -Continue Bentyl as needed for abdominal pain    Hypophosphatemia  -Current phosphorus 2.2  -status post neutra phos    COPD  -Continue Proventil  -Continue supplemental oxygen as needed    Hypertension  -Patient blood pressure is borderline low, we were holding lisinopril and Lasix inpatient, we will continue to hold at discharge, patient is to follow-up with PCP on instructions on restarting  -Continue Coreg    History of proximal atrial fibrillation  -Continue Eliquis and Coreg    Discharge Medications:   Current Discharge Medication List      START taking these medications    Details   metroNIDAZOLE (FLAGYL) 500 mg tablet Take 1 Tablet by mouth three (3) times daily for 30 doses. Qty: 30 Tablet, Refills: 0  Start date: 10/6/2021, End date: 10/16/2021         CONTINUE these medications which have NOT CHANGED    Details   dicyclomine (BENTYL) 10 mg capsule Take 10 mg by mouth every six (6) hours as needed for Abdominal Cramps. promethazine (PHENERGAN) 25 mg tablet Take 1 Tab by mouth every twelve (12) hours as needed for Nausea. Qty: 60 Tab, Refills: 3    Associated Diagnoses: Nausea      methocarbamoL (ROBAXIN) 500 mg tablet Take 1 Tab by mouth four (4) times daily. Qty: 360 Tab, Refills: 3    Associated Diagnoses: Night muscle spasms      apixaban (ELIQUIS) 2.5 mg tablet Take 2.5 mg by mouth two (2) times a day. calcium citrate-vitamin d3 (CITRACAL+D) 315 mg-5 mcg (200 unit) tab Take 1 Tab by mouth daily (with breakfast). Omega-3 Fatty Acids 60- mg cpDR Take  by mouth. carvediloL (COREG) 3.125 mg tablet Take 3.125 mg by mouth two (2) times daily (with meals). tamsulosin (Flomax) 0.4 mg capsule Take 0.4 mg by mouth daily. albuterol (PROVENTIL HFA, VENTOLIN HFA, PROAIR HFA) 90 mcg/actuation inhaler Take 2 Puffs by inhalation every six (6) hours as needed for Wheezing. Indications: chronic obstructive pulmonary disease  Qty: 1 Inhaler, Refills: 3    Associated Diagnoses: Chronic obstructive pulmonary disease, unspecified COPD type (HCC)      lidocaine (LIDODERM) 5 % Apply patch to the affected area for 12 hours a day and remove for 12 hours a day.   Qty: 30 Each, Refills: 2    Associated Diagnoses: Trapezius muscle spasm      famotidine (PEPCID) 20 mg tablet Take 20 mg by mouth two (2) times a day. Linzess 72 mcg cap capsule Take 1 capsule by mouth once daily for 90 days  Qty: 90 Cap, Refills: 0      arformoteroL (BROVANA) 15 mcg/2 mL nebu neb solution 15 mcg by Nebulization route. cetirizine (ZYRTEC) 5 mg tablet Take 5 mg by mouth daily. fluticasone propionate (FLONASE ALLERGY RELIEF NA) 1 Spray by Nasal route. levocetirizine (XYZAL) 5 mg tablet Take 5 mg by mouth daily. Oxygen 2 Liters at night      simvastatin (ZOCOR) 20 mg tablet Take 20 mg by mouth nightly. cyanocobalamin (VITAMIN B12) 500 mcg tablet Take 500 mcg by mouth daily. multivitamin (ONE A DAY) tablet Take 1 Tab by mouth daily. polyethylene glycol (MIRALAX) 17 gram/dose powder Take 17 g by mouth daily as needed. STOP taking these medications       lisinopriL (PRINIVIL, ZESTRIL) 20 mg tablet Comments:   Reason for Stopping:         furosemide (LASIX) 20 mg tablet Comments:   Reason for Stopping: Follow up Care:    1. Festus Telles MD in 1-2 weeks. Follow-up Information     Follow up With Specialties Details Jr Nolasco MD Internal Medicine On 10/6/2021 Appointment at 10:00am with Dr Luis Whitaker [id number] [phone]              Patient Follow Up Instructions: Activity: Activity as tolerated  Diet:  Cardiac Diet    Condition at Discharge:  Stable  __________________________________________________________________    Disposition  Home Health Care [facility] [place], [place]  ____________________________________________________________________    Code Status:  DNR  ___________________________________________________________________    Discharge Exam:  Patient seen and examined by me on discharge day.   Pertinent Findings:  Gen:    Not in distress  Chest: Clear lungs  CVS:   Regular rhythm. No edema  Abd:  Soft, not distended, not tender  Neuro:  Alert    CONSULTATIONS: GI    Significant Diagnostic Studies:   Recent Results (from the past 24 hour(s))   METABOLIC PANEL, BASIC    Collection Time: 10/06/21  6:05 AM   Result Value Ref Range    Sodium 139 135 - 145 mmol/L    Potassium 3.4 3.2 - 5.1 mmol/L    Chloride 109 94 - 111 mmol/L    CO2 25 21 - 33 mmol/L    Anion gap 5 mmol/L    Glucose 95 70 - 110 mg/dL    BUN <1 (L) 9 - 21 mg/dL    Creatinine 0.34 (L) 0.70 - 1.20 mg/dL    BUN/Creatinine ratio Not calculated      GFR est AA >60 ml/min/1.73m2    GFR est non-AA >60 ml/min/1.73m2    Calcium 7.4 (L) 8.5 - 10.5 mg/dL   PHOSPHORUS    Collection Time: 10/06/21  6:23 AM   Result Value Ref Range    Phosphorus 2.2 (L) 2.5 - 4.5 mg/dL     XR CHEST PORT   Final Result      Right basilar opacities. CT ABD PELV W CONT   Final Result   Descending and sigmoid colon diverticulosis with sigmoid colonic   thickening and a few areas of surrounding infiltration. Consider mild acute   diverticulitis superimposed on a chronic diverticular process. Small bilateral pleural effusions. Patchy consolidation at the right lung base possibly residual pneumonia as an   infiltrate was seen on previous study. Small pericardial effusion.                     Signed:  GELA Joseph  10/6/2021  3:56 PM

## 2021-10-06 NOTE — PROGRESS NOTES
Problem: Falls - Risk of  Goal: *Absence of Falls  Description: Document Earma Deepthi Fall Risk and appropriate interventions in the flowsheet. 10/6/2021 1201 by Jose G Odom LPN  Outcome: Resolved/Met  Note: Fall Risk Interventions:  Mobility Interventions: Patient to call before getting OOB         Medication Interventions: Patient to call before getting OOB    Elimination Interventions: Bed/chair exit alarm, Call light in reach           10/6/2021 1027 by Jose G Odom LPN  Outcome: Progressing Towards Goal  Note: Fall Risk Interventions:  Mobility Interventions: Patient to call before getting OOB         Medication Interventions: Patient to call before getting OOB    Elimination Interventions: Bed/chair exit alarm, Call light in reach              Problem: Patient Education: Go to Patient Education Activity  Goal: Patient/Family Education  10/6/2021 1201 by Jose G Odom LPN  Outcome: Resolved/Met  10/6/2021 1027 by Jose G Odom LPN  Outcome: Progressing Towards Goal     Problem: Risk for Spread of Infection  Goal: Prevent transmission of infectious organism to others  Description: Prevent the transmission of infectious organisms to other patients, staff members, and visitors.   10/6/2021 1201 by Jose G Odom LPN  Outcome: Resolved/Met  10/6/2021 1027 by Jose G Odom LPN  Outcome: Progressing Towards Goal     Problem: Patient Education:  Go to Education Activity  Goal: Patient/Family Education  10/6/2021 1201 by Jose G Odom LPN  Outcome: Resolved/Met  10/6/2021 1027 by Jose G Odom LPN  Outcome: Progressing Towards Goal     Problem: Patient Education: Go to Patient Education Activity  Goal: Patient/Family Education  10/6/2021 1201 by Jose G Odom LPN  Outcome: Resolved/Met  10/6/2021 1027 by Jose G Odom LPN  Outcome: Progressing Towards Goal     Problem: Nutrition Deficit  Goal: *Optimize nutritional status  10/6/2021 1201 by Jose G Odom LPN  Outcome: Resolved/Met  10/6/2021 1027 by Humberto Cespedes LPN  Outcome: Progressing Towards Goal     Problem: Pressure Injury - Risk of  Goal: *Prevention of pressure injury  Description: Document Jeffrey Scale and appropriate interventions in the flowsheet. 10/6/2021 1201 by Humberto Cespedes LPN  Outcome: Resolved/Met  Note: Pressure Injury Interventions:  Sensory Interventions: Assess changes in LOC    Moisture Interventions: Minimize layers    Activity Interventions: Assess need for specialty bed    Mobility Interventions: Assess need for specialty bed, PT/OT evaluation    Nutrition Interventions: Document food/fluid/supplement intake, Offer support with meals,snacks and hydration    Friction and Shear Interventions: HOB 30 degrees or less             10/6/2021 1027 by Humberto Cespedes LPN  Outcome: Progressing Towards Goal  Note: Pressure Injury Interventions:  Sensory Interventions: Assess changes in LOC    Moisture Interventions: Minimize layers    Activity Interventions: Assess need for specialty bed    Mobility Interventions: Assess need for specialty bed, PT/OT evaluation    Nutrition Interventions: Document food/fluid/supplement intake, Offer support with meals,snacks and hydration    Friction and Shear Interventions: HOB 30 degrees or less                Problem: Patient Education: Go to Patient Education Activity  Goal: Patient/Family Education  10/6/2021 1201 by Humberto Cespedes LPN  Outcome: Resolved/Met  10/6/2021 1027 by Humberto Cespedes LPN  Outcome: Progressing Towards Goal     Problem: Risk for Spread of Infection  Goal: Prevent transmission of infectious organism to others  Description: Prevent the transmission of infectious organisms to other patients, staff members, and visitors.   10/6/2021 1201 by Humberto Cespedes LPN  Outcome: Resolved/Met  10/6/2021 1027 by Humberto Cespedes LPN  Outcome: Progressing Towards Goal     Problem: Patient Education:  Go to Education Activity  Goal: Patient/Family Education  10/6/2021 1201 by Jose G Odom LPN  Outcome: Resolved/Met  10/6/2021 1027 by Jose G Odom LPN  Outcome: Progressing Towards Goal

## 2021-10-06 NOTE — PROGRESS NOTES
0700- assumed care of patient   0900- AM medications given without problems. Full body assessment completed. Pt is having abd pain and cramping but states it is \"tolerable\". Pt declined pain med/bentyl/zofran. Mild swelling noted to bilat feet. No distress noted. CBWR  1300- NP at bedside to discuss DC plans. 1320- Pt rang call bell stating she wanted to get back into bed. Pt assisted to bed and readjusted for comfort. Pt states she \"feels the worse she ever has\" and is reluctant to go home. She is very short with her responses which she has not been earlier. Discussed discharge with pt, she has a live in care giver, her daughter is her neighbor and she has home health as well. Pt verbalized understanding and feels reassured. [phone] 89- pt agreed to take something for pain, she is moaning and its loud enough for staff at nurses station to hear. 1 percocet given PO. Pt was given zofran and bentyl earlier. 1 large BM noted, pt changed. 1520- pt daughter at bedside and spoke with this nurse and stated \"I think she is bucking every step to go home and feels like a burden\" Pt daughter spoke with NP, Mara. There is not much more we can do inpatient that the pt cannot do at home. PT daughter understands and agrees with plan to [address] [place] [address] 15- Patient discharged to home with daughter. Pt was moaning in pain and reluctant to go home but was reassured by staff and her daughter.  All belongings with pt

## 2021-10-07 ENCOUNTER — PATIENT OUTREACH (OUTPATIENT)
Dept: CASE MANAGEMENT | Age: 86
End: 2021-10-07

## 2021-10-07 NOTE — PROGRESS NOTES
Care Transitions Initial Call    Call within 2 business days of discharge: Yes     Patient: Yassine Castro Patient : 1934 MRN: [id number]    Last Discharge [address]       Complaint Diagnosis Description Type Department Provider    21 Abdominal Pain Diverticulitis . .. ED to Hosp-Admission (Discharged) (ADMIT) [facility] Ora Mccarthy MD; Vignesh Simpson. .. Was this an external facility discharge? No Discharge Facility: n/a    Challenges to be reviewed by the provider   Additional needs identified to be addressed with provider: no  none         Method of communication with provider : none    Advance Care Planning:   Does patient have an Advance Directive:  on file. Inpatient Readmission Risk score: Unplanned Readmit Risk Score: 25    Was this a readmission? yes   Patient stated reason for the admission: abdominal pain     Patients top risk factors for readmission: medical condition-chronic medical condtion , hospitalizations. Interventions to address risk factors: closely follow up with PCP    Care Transition Nurse (CTN) contacted the patient by telephone to perform post hospital discharge assessment. Verified name and  with patient as identifiers. Provided introduction to self, and explanation of the CTN role. Patient reports that she is doing okay. Pt. States that her Sister in law lives with her. Pt.daughter lives across the street. Pt. Levorn Lute at this time. Pt. States that she is aware of her upcoming PCP apt. Pt. Son is here to visit. CTN is unable to complete discharge assessment as Pt. Kept the conversation short. Reminded Pt. to go to the nearest emergency room for chest pain, shortness of breath, returning of symptoms that brought her to the emergency room and/or worsening of symptoms. Pt. Verbalized and repeated back understanding. No questions, concerns and/or needs at this time as per Pt.      CTN reviewed discharge instructions, medical action plan and red flags with patient who verbalized understanding. Were discharge instructions available to patient? yes. Reviewed appropriate site of care based on symptoms and resources available to patient including: PCP and When to call 911. Patient given an opportunity to ask questions and does not have any further questions or concerns at this time. The patient agrees to contact the PCP office for questions related to their healthcare.      [facility] follow up appointment(s):   Future Appointments   Date Time Provider Leola Bartlett   10/12/2021 10:00 AM Chace Colunga MD [facility] BS AMB

## 2021-10-08 ENCOUNTER — TELEPHONE (OUTPATIENT)
Dept: INTERNAL MEDICINE CLINIC | Age: 86
End: 2021-10-08

## 2021-10-08 ENCOUNTER — PATIENT OUTREACH (OUTPATIENT)
Dept: CASE MANAGEMENT | Age: 86
End: 2021-10-08

## 2021-10-08 DIAGNOSIS — K21.9 GASTROESOPHAGEAL REFLUX DISEASE WITHOUT ESOPHAGITIS: Primary | ICD-10-CM

## 2021-10-08 RX ORDER — FAMOTIDINE 20 MG/1
20 TABLET, FILM COATED ORAL 2 TIMES DAILY
Qty: 60 TABLET | Refills: 2 | Status: SHIPPED | OUTPATIENT
Start: 2021-10-08

## 2021-10-08 NOTE — TELEPHONE ENCOUNTER
Refill request for Rx Famotidine, patient is out and has no refills. [address], [place] only their fax is out.

## 2021-10-08 NOTE — PROGRESS NOTES
Care Transitions Initial Call    Call within 2 business days of discharge: Yes     Patient: Domonique Ji Patient : 1934 MRN: [id number]    Last Discharge [address]       Complaint Diagnosis Description Type Department Provider    21 Abdominal Pain Diverticulitis . .. ED to Hosp-Admission (Discharged) (ADMIT) [facility] Caleb Terrazas MD; Taylor Hernandez. .. Care Transition Nurse (CTN) contacted the patient by telephone to perform post hospital discharge assessment. Verified name and  with patient as identifiers. Provided introduction to self, and explanation of the CTN role. Patient reports that she is still feeling the same. Pt. Declined to review medrec at this time. Pt. Stated \" I just want to  rest.\"  Patient also states that she knows when to go back to the emergency room. No questions, concerns and/or needs at this time as per Pt. Pt. Kept the conversation short and ended the call. [facility] follow up appointment(s):   Future Appointments   Date Time Provider Leola Bartlett   10/12/2021 10:00 AM Analia Blackwell MD [facility] BS AMB

## 2021-10-09 NOTE — PROGRESS NOTES
Physician Progress Note      PATIENT:               Amanda Lorenzo  CSN #:                  [id number]  :                       1934  ADMIT DATE:       2021 9:45 PM  [address] [place] DATE:        10/6/2021 5:48 PM  RESPONDING  PROVIDER #:        Katya Herman ACNP          QUERY TEXT:    Pt admitted with c  diff colitis. Noted documentation of severe  and  mild  malnutrition  noted in  Nutritional consult  dated   10/4. If possible, please document in progress notes and discharge summary:    The medical record reflects the following:    Risk Factors: -  poor intake prior to admission, vague abdominal pain found to have diverticulosis and tested + C-diff started on vancomycin. Emaciated frame consult for physical deconditioning    Clinical Indicators: per  notes - \"  Malnutrition Status:  Mild malnutrition (C-diff isolation)  Context:  Acute illness  Findings of the 6 clinical characteristics of malnutrition:  Energy Intake:  7 - 50% or less of est energy requirements for 5 or more days  Weight Loss:  Unable to assess  Body Fat Loss:  Unable to assess,  Muscle Mass Loss:  Unable to assess,  Fluid Accumulation:  No significant fluid accumulation,   Strength:  Not performed    Nutrition Diagnosis:  ? Severe malnutrition, Increased nutrient needs related to catabolic illness, inadequate protein-energy intake, altered GI function as evidenced by diarrhea, BMI, poor intake prior to admission    email sent to Leanne Love, nutritionist  to clarify specificity  of malnutrition. response-    Physical nutrition assessment could not be completed due to pt C-diff. But if you take in to account Low albumin, currently having C-diff so losing nutrition in diarrhea, with poor intake prior to admission with chronic lung mass which daughter did not want to be treated, as well as the emaciated frame which the  documented you can say she is severe malnutrition.   Its just the malnutrition does not take into account the other variables to add up to > 7 pts. ?  Treatment: - patient was given  ensure plus TID until was  advanced, added  Gelatein 20 TID    Thank you,   Rubina Garcia RN   CCDS   x [phone]  Options provided:  -- severe malnutrition  confirmed present on admission  -- Mild malnutrition confirmed  present on admission  -- Other - I will add my own diagnosis  -- Disagree - Not applicable / Not valid  -- Disagree - Clinically unable to determine / Unknown  -- Refer to Clinical Documentation Reviewer    PROVIDER RESPONSE TEXT:    The diagnosis of mild malnutrition was confirmed present on admission.     Query created by: Zohreh Henderson on 10/8/2021 6:49 AM      Electronically signed by:  Danica REN 10/9/2021 11:06 AM

## 2021-10-14 ENCOUNTER — PATIENT OUTREACH (OUTPATIENT)
Dept: CASE MANAGEMENT | Age: 86
End: 2021-10-14

## 2021-10-14 NOTE — PROGRESS NOTES
Care Transitions Follow Up Call    Challenges to be reviewed by the provider   Additional needs identified to be addressed with provider: no  none           Method of communication with provider : none    CTN reached a female voice on phone who declined to give out her name. The female voice who answered the phone states that Patient is currently unavailable at this time. CTN did not give any medical/health  information to the female voice who answered the phone. Will try to call Patient some other time.

## 2021-10-25 ENCOUNTER — APPOINTMENT (OUTPATIENT)
Dept: CT IMAGING | Age: 86
DRG: 291 | End: 2021-10-25
Attending: INTERNAL MEDICINE
Payer: MEDICARE

## 2021-10-25 ENCOUNTER — HOSPITAL ENCOUNTER (INPATIENT)
Age: 86
LOS: 2 days | Discharge: HOME HEALTH | DRG: 291 | End: 2021-10-27
Attending: INTERNAL MEDICINE | Admitting: INTERNAL MEDICINE
Payer: MEDICARE

## 2021-10-25 ENCOUNTER — APPOINTMENT (OUTPATIENT)
Dept: NON INVASIVE DIAGNOSTICS | Age: 86
DRG: 291 | End: 2021-10-25
Attending: PHYSICIAN ASSISTANT
Payer: MEDICARE

## 2021-10-25 ENCOUNTER — APPOINTMENT (OUTPATIENT)
Dept: GENERAL RADIOLOGY | Age: 86
DRG: 291 | End: 2021-10-25
Attending: INTERNAL MEDICINE
Payer: MEDICARE

## 2021-10-25 DIAGNOSIS — M79.10 MYALGIA: ICD-10-CM

## 2021-10-25 DIAGNOSIS — I50.43 ACUTE ON CHRONIC COMBINED SYSTOLIC AND DIASTOLIC CONGESTIVE HEART FAILURE (HCC): Primary | ICD-10-CM

## 2021-10-25 DIAGNOSIS — K52.9 NONINFECTIOUS GASTROENTERITIS, UNSPECIFIED TYPE: ICD-10-CM

## 2021-10-25 PROBLEM — R77.8 ELEVATED TROPONIN: Status: ACTIVE | Noted: 2021-10-25

## 2021-10-25 PROBLEM — I50.20 HFREF (HEART FAILURE WITH REDUCED EJECTION FRACTION) (HCC): Status: ACTIVE | Noted: 2021-10-25

## 2021-10-25 LAB
ALBUMIN SERPL-MCNC: 2.8 G/DL (ref 3.5–4.7)
ALBUMIN/GLOB SERPL: 0.6 {RATIO}
ALP SERPL-CCNC: 59 U/L (ref 38–126)
ALT SERPL-CCNC: 9 U/L (ref 3–52)
ANION GAP SERPL CALC-SCNC: 9 MMOL/L
APPEARANCE UR: CLEAR
AST SERPL W P-5'-P-CCNC: 21 U/L (ref 14–74)
ATRIAL RATE: 106 BPM
BACTERIA URNS QL MICRO: NEGATIVE /HPF
BASOPHILS # BLD: 0 K/UL (ref 0–0.1)
BASOPHILS NFR BLD: 0 % (ref 0–2)
BILIRUB SERPL-MCNC: 0.6 MG/DL (ref 0.2–1)
BILIRUB UR QL: ABNORMAL
BNP SERPL-MCNC: 780 PG/ML (ref 0–100)
BUN SERPL-MCNC: 11 MG/DL (ref 9–21)
BUN/CREAT SERPL: 22
CA-I BLD-MCNC: 8.5 MG/DL (ref 8.5–10.5)
CALCULATED P AXIS, ECG09: 59 DEGREES
CALCULATED R AXIS, ECG10: 0 DEGREES
CALCULATED T AXIS, ECG11: -19 DEGREES
CHLORIDE SERPL-SCNC: 104 MMOL/L (ref 94–111)
CO2 SERPL-SCNC: 22 MMOL/L (ref 21–33)
COLOR UR: ABNORMAL
COVID-19 RAPID TEST, COVR: NOT DETECTED
CREAT SERPL-MCNC: 0.5 MG/DL (ref 0.7–1.2)
DIAGNOSIS, 93000: NORMAL
DIFFERENTIAL METHOD BLD: ABNORMAL
EOSINOPHIL # BLD: 0 K/UL (ref 0–0.4)
EOSINOPHIL NFR BLD: 0 % (ref 0–5)
EPITH CASTS URNS QL MICRO: ABNORMAL /LPF (ref 0–20)
ERYTHROCYTE [DISTWIDTH] IN BLOOD BY AUTOMATED COUNT: 14.9 % (ref 11.6–14.5)
FLUAV AG NPH QL IA: NEGATIVE
FLUBV AG NOSE QL IA: NEGATIVE
GLOBULIN SER CALC-MCNC: 4.6 G/DL
GLUCOSE SERPL-MCNC: 151 MG/DL (ref 70–110)
GLUCOSE UR STRIP.AUTO-MCNC: NEGATIVE MG/DL
HCT VFR BLD AUTO: 39.3 % (ref 35–45)
HGB BLD-MCNC: 12.1 G/DL (ref 12–16)
HGB UR QL STRIP: NEGATIVE
HYALINE CASTS URNS QL MICRO: ABNORMAL /LPF
IMM GRANULOCYTES # BLD AUTO: 0.1 K/UL (ref 0–0.04)
IMM GRANULOCYTES NFR BLD AUTO: 0 % (ref 0–0.5)
KETONES UR QL STRIP.AUTO: 15 MG/DL
LACTATE SERPL-SCNC: 1.6 MMOL/L (ref 0.5–2)
LEUKOCYTE ESTERASE UR QL STRIP.AUTO: ABNORMAL
LYMPHOCYTES # BLD: 1.3 K/UL (ref 0.9–3.6)
LYMPHOCYTES NFR BLD: 11 % (ref 21–52)
MAGNESIUM SERPL-MCNC: 1.9 MG/DL (ref 1.7–2.8)
MCH RBC QN AUTO: 30.5 PG (ref 24–34)
MCHC RBC AUTO-ENTMCNC: 30.8 G/DL (ref 31–37)
MCV RBC AUTO: 99 FL (ref 78–100)
MONOCYTES # BLD: 0.8 K/UL (ref 0.05–1.2)
MONOCYTES NFR BLD: 7 % (ref 3–10)
MUCOUS THREADS URNS QL MICRO: ABNORMAL /LPF
NEUTS SEG # BLD: 9.9 K/UL (ref 1.8–8)
NEUTS SEG NFR BLD: 82 % (ref 40–73)
NITRITE UR QL STRIP.AUTO: NEGATIVE
NRBC # BLD: 0 K/UL (ref 0–0.01)
NRBC BLD-RTO: 0 PER 100 WBC
P-R INTERVAL, ECG05: 158 MS
PH UR STRIP: 6 [PH] (ref 5–8)
PLATELET # BLD AUTO: 377 K/UL (ref 135–420)
PMV BLD AUTO: 10.2 FL (ref 9.2–11.8)
POTASSIUM SERPL-SCNC: 4.4 MMOL/L (ref 3.2–5.1)
PROT SERPL-MCNC: 7.4 G/DL (ref 6.1–8.4)
PROT UR STRIP-MCNC: 100 MG/DL
Q-T INTERVAL, ECG07: 495 MS
QRS DURATION, ECG06: 158 MS
QTC CALCULATION (BEZET), ECG08: 523 MS
RBC # BLD AUTO: 3.97 M/UL (ref 4.2–5.3)
RBC #/AREA URNS HPF: ABNORMAL /HPF (ref 0–2)
SARS-COV-2, COV2: NORMAL
SODIUM SERPL-SCNC: 135 MMOL/L (ref 135–145)
SP GR UR REFRACTOMETRY: 1.02 (ref 1–1.03)
SPECIMEN SOURCE: NORMAL
TROPONIN I SERPL-MCNC: 0.36 NG/ML (ref 0.02–0.05)
TROPONIN I SERPL-MCNC: 0.65 NG/ML (ref 0.02–0.05)
TROPONIN I SERPL-MCNC: 0.67 NG/ML (ref 0.02–0.05)
UROBILINOGEN UR QL STRIP.AUTO: 0.2 EU/DL (ref 0.2–1)
VENTRICULAR RATE, ECG03: 67 BPM
WBC # BLD AUTO: 12 K/UL (ref 4.6–13.2)
WBC URNS QL MICRO: ABNORMAL /HPF (ref 0–4)

## 2021-10-25 PROCEDURE — 94640 AIRWAY INHALATION TREATMENT: CPT

## 2021-10-25 PROCEDURE — 94761 N-INVAS EAR/PLS OXIMETRY MLT: CPT

## 2021-10-25 PROCEDURE — 93005 ELECTROCARDIOGRAM TRACING: CPT

## 2021-10-25 PROCEDURE — 83605 ASSAY OF LACTIC ACID: CPT

## 2021-10-25 PROCEDURE — 77010033678 HC OXYGEN DAILY

## 2021-10-25 PROCEDURE — 85025 COMPLETE CBC W/AUTO DIFF WBC: CPT

## 2021-10-25 PROCEDURE — 74176 CT ABD & PELVIS W/O CONTRAST: CPT

## 2021-10-25 PROCEDURE — U0003 INFECTIOUS AGENT DETECTION BY NUCLEIC ACID (DNA OR RNA); SEVERE ACUTE RESPIRATORY SYNDROME CORONAVIRUS 2 (SARS-COV-2) (CORONAVIRUS DISEASE [COVID-19]), AMPLIFIED PROBE TECHNIQUE, MAKING USE OF HIGH THROUGHPUT TECHNOLOGIES AS DESCRIBED BY CMS-2020-01-R: HCPCS

## 2021-10-25 PROCEDURE — 74011250637 HC RX REV CODE- 250/637: Performed by: INTERNAL MEDICINE

## 2021-10-25 PROCEDURE — 96375 TX/PRO/DX INJ NEW DRUG ADDON: CPT

## 2021-10-25 PROCEDURE — 97530 THERAPEUTIC ACTIVITIES: CPT

## 2021-10-25 PROCEDURE — 83735 ASSAY OF MAGNESIUM: CPT

## 2021-10-25 PROCEDURE — 87804 INFLUENZA ASSAY W/OPTIC: CPT

## 2021-10-25 PROCEDURE — 83880 ASSAY OF NATRIURETIC PEPTIDE: CPT

## 2021-10-25 PROCEDURE — 81001 URINALYSIS AUTO W/SCOPE: CPT

## 2021-10-25 PROCEDURE — 96374 THER/PROPH/DIAG INJ IV PUSH: CPT

## 2021-10-25 PROCEDURE — 97161 PT EVAL LOW COMPLEX 20 MIN: CPT

## 2021-10-25 PROCEDURE — 74011250637 HC RX REV CODE- 250/637: Performed by: PHYSICIAN ASSISTANT

## 2021-10-25 PROCEDURE — 71045 X-RAY EXAM CHEST 1 VIEW: CPT

## 2021-10-25 PROCEDURE — 84484 ASSAY OF TROPONIN QUANT: CPT

## 2021-10-25 PROCEDURE — 99285 EMERGENCY DEPT VISIT HI MDM: CPT

## 2021-10-25 PROCEDURE — 80053 COMPREHEN METABOLIC PANEL: CPT

## 2021-10-25 PROCEDURE — 87635 SARS-COV-2 COVID-19 AMP PRB: CPT

## 2021-10-25 PROCEDURE — 74011250636 HC RX REV CODE- 250/636: Performed by: INTERNAL MEDICINE

## 2021-10-25 PROCEDURE — 93306 TTE W/DOPPLER COMPLETE: CPT

## 2021-10-25 PROCEDURE — 65270000029 HC RM PRIVATE

## 2021-10-25 RX ORDER — TAMSULOSIN HYDROCHLORIDE 0.4 MG/1
0.4 CAPSULE ORAL DAILY
Status: DISCONTINUED | OUTPATIENT
Start: 2021-10-25 | End: 2021-10-27 | Stop reason: HOSPADM

## 2021-10-25 RX ORDER — BUDESONIDE AND FORMOTEROL FUMARATE DIHYDRATE 80; 4.5 UG/1; UG/1
2 AEROSOL RESPIRATORY (INHALATION)
Status: DISCONTINUED | OUTPATIENT
Start: 2021-10-25 | End: 2021-10-27 | Stop reason: HOSPADM

## 2021-10-25 RX ORDER — POLYETHYLENE GLYCOL 3350 17 G/17G
17 POWDER, FOR SOLUTION ORAL
Status: DISCONTINUED | OUTPATIENT
Start: 2021-10-25 | End: 2021-10-25

## 2021-10-25 RX ORDER — ARFORMOTEROL TARTRATE 15 UG/2ML
15 SOLUTION RESPIRATORY (INHALATION)
Status: DISCONTINUED | OUTPATIENT
Start: 2021-10-25 | End: 2021-10-25 | Stop reason: CLARIF

## 2021-10-25 RX ORDER — DICYCLOMINE HYDROCHLORIDE 10 MG/1
10 CAPSULE ORAL
Status: DISCONTINUED | OUTPATIENT
Start: 2021-10-25 | End: 2021-10-27 | Stop reason: HOSPADM

## 2021-10-25 RX ORDER — FUROSEMIDE 10 MG/ML
40 INJECTION INTRAMUSCULAR; INTRAVENOUS ONCE
Status: COMPLETED | OUTPATIENT
Start: 2021-10-25 | End: 2021-10-25

## 2021-10-25 RX ORDER — METHOCARBAMOL 500 MG/1
500 TABLET, FILM COATED ORAL 4 TIMES DAILY
Status: DISCONTINUED | OUTPATIENT
Start: 2021-10-25 | End: 2021-10-27 | Stop reason: HOSPADM

## 2021-10-25 RX ORDER — ONDANSETRON 2 MG/ML
4 INJECTION INTRAMUSCULAR; INTRAVENOUS
Status: DISCONTINUED | OUTPATIENT
Start: 2021-10-25 | End: 2021-10-27 | Stop reason: HOSPADM

## 2021-10-25 RX ORDER — FAMOTIDINE 20 MG/1
20 TABLET, FILM COATED ORAL 2 TIMES DAILY
Status: DISCONTINUED | OUTPATIENT
Start: 2021-10-25 | End: 2021-10-27 | Stop reason: HOSPADM

## 2021-10-25 RX ORDER — ACETAMINOPHEN 650 MG/1
650 SUPPOSITORY RECTAL
Status: DISCONTINUED | OUTPATIENT
Start: 2021-10-25 | End: 2021-10-27 | Stop reason: HOSPADM

## 2021-10-25 RX ORDER — ALBUTEROL SULFATE 90 UG/1
2 AEROSOL, METERED RESPIRATORY (INHALATION)
Status: COMPLETED | OUTPATIENT
Start: 2021-10-25 | End: 2021-10-25

## 2021-10-25 RX ORDER — CETIRIZINE HCL 10 MG
5 TABLET ORAL DAILY
Status: DISCONTINUED | OUTPATIENT
Start: 2021-10-25 | End: 2021-10-27 | Stop reason: HOSPADM

## 2021-10-25 RX ORDER — TRAMADOL HYDROCHLORIDE 50 MG/1
50 TABLET ORAL
Status: DISCONTINUED | OUTPATIENT
Start: 2021-10-25 | End: 2021-10-27 | Stop reason: HOSPADM

## 2021-10-25 RX ORDER — POLYETHYLENE GLYCOL 3350 17 G/17G
17 POWDER, FOR SOLUTION ORAL DAILY PRN
Status: DISCONTINUED | OUTPATIENT
Start: 2021-10-25 | End: 2021-10-27 | Stop reason: HOSPADM

## 2021-10-25 RX ORDER — SODIUM CHLORIDE 0.9 % (FLUSH) 0.9 %
5-40 SYRINGE (ML) INJECTION AS NEEDED
Status: DISCONTINUED | OUTPATIENT
Start: 2021-10-25 | End: 2021-10-27 | Stop reason: HOSPADM

## 2021-10-25 RX ORDER — FENTANYL CITRATE 50 UG/ML
25 INJECTION, SOLUTION INTRAMUSCULAR; INTRAVENOUS
Status: COMPLETED | OUTPATIENT
Start: 2021-10-25 | End: 2021-10-25

## 2021-10-25 RX ORDER — ACETAMINOPHEN 325 MG/1
650 TABLET ORAL
Status: DISCONTINUED | OUTPATIENT
Start: 2021-10-25 | End: 2021-10-27 | Stop reason: HOSPADM

## 2021-10-25 RX ORDER — ARFORMOTEROL TARTRATE 15 UG/2ML
15 SOLUTION RESPIRATORY (INHALATION)
Status: DISCONTINUED | OUTPATIENT
Start: 2021-10-25 | End: 2021-10-25

## 2021-10-25 RX ORDER — PROMETHAZINE HYDROCHLORIDE 25 MG/1
12.5 TABLET ORAL
Status: DISCONTINUED | OUTPATIENT
Start: 2021-10-25 | End: 2021-10-27 | Stop reason: HOSPADM

## 2021-10-25 RX ORDER — CARVEDILOL 3.12 MG/1
3.12 TABLET ORAL 2 TIMES DAILY WITH MEALS
Status: DISCONTINUED | OUTPATIENT
Start: 2021-10-25 | End: 2021-10-27 | Stop reason: HOSPADM

## 2021-10-25 RX ORDER — ALBUTEROL SULFATE 90 UG/1
2 AEROSOL, METERED RESPIRATORY (INHALATION)
Status: DISCONTINUED | OUTPATIENT
Start: 2021-10-25 | End: 2021-10-27 | Stop reason: HOSPADM

## 2021-10-25 RX ORDER — CALCIUM CARBONATE/VITAMIN D3 250-3.125
1 TABLET ORAL
Status: DISCONTINUED | OUTPATIENT
Start: 2021-10-25 | End: 2021-10-27 | Stop reason: HOSPADM

## 2021-10-25 RX ORDER — LANOLIN ALCOHOL/MO/W.PET/CERES
500 CREAM (GRAM) TOPICAL DAILY
Status: DISCONTINUED | OUTPATIENT
Start: 2021-10-25 | End: 2021-10-27 | Stop reason: HOSPADM

## 2021-10-25 RX ORDER — ATORVASTATIN CALCIUM 10 MG/1
10 TABLET, FILM COATED ORAL
Status: DISCONTINUED | OUTPATIENT
Start: 2021-10-25 | End: 2021-10-27 | Stop reason: HOSPADM

## 2021-10-25 RX ORDER — SODIUM CHLORIDE 0.9 % (FLUSH) 0.9 %
5-40 SYRINGE (ML) INJECTION EVERY 8 HOURS
Status: DISCONTINUED | OUTPATIENT
Start: 2021-10-25 | End: 2021-10-27 | Stop reason: HOSPADM

## 2021-10-25 RX ORDER — BUDESONIDE AND FORMOTEROL FUMARATE DIHYDRATE 80; 4.5 UG/1; UG/1
2 AEROSOL RESPIRATORY (INHALATION)
Status: DISCONTINUED | OUTPATIENT
Start: 2021-10-25 | End: 2021-10-25

## 2021-10-25 RX ORDER — THERA TABS 400 MCG
1 TAB ORAL DAILY
Status: DISCONTINUED | OUTPATIENT
Start: 2021-10-25 | End: 2021-10-27 | Stop reason: HOSPADM

## 2021-10-25 RX ADMIN — METHYLPREDNISOLONE SODIUM SUCCINATE 125 MG: 125 INJECTION, POWDER, FOR SOLUTION INTRAMUSCULAR; INTRAVENOUS at 04:15

## 2021-10-25 RX ADMIN — FENTANYL CITRATE 25 MCG: 50 INJECTION INTRAMUSCULAR; INTRAVENOUS at 03:43

## 2021-10-25 RX ADMIN — APIXABAN 2.5 MG: 2.5 TABLET, FILM COATED ORAL at 17:46

## 2021-10-25 RX ADMIN — CARVEDILOL 3.12 MG: 3.12 TABLET, FILM COATED ORAL at 10:05

## 2021-10-25 RX ADMIN — METHOCARBAMOL 500 MG: 500 TABLET ORAL at 10:04

## 2021-10-25 RX ADMIN — BUDESONIDE AND FORMOTEROL FUMARATE DIHYDRATE 2 PUFF: 80; 4.5 AEROSOL RESPIRATORY (INHALATION) at 20:23

## 2021-10-25 RX ADMIN — METHOCARBAMOL 500 MG: 500 TABLET ORAL at 12:22

## 2021-10-25 RX ADMIN — CARVEDILOL 3.12 MG: 3.12 TABLET, FILM COATED ORAL at 17:46

## 2021-10-25 RX ADMIN — FAMOTIDINE 20 MG: 20 TABLET ORAL at 10:05

## 2021-10-25 RX ADMIN — Medication 10 ML: at 21:14

## 2021-10-25 RX ADMIN — Medication 10 ML: at 13:59

## 2021-10-25 RX ADMIN — FAMOTIDINE 20 MG: 20 TABLET ORAL at 17:46

## 2021-10-25 RX ADMIN — APIXABAN 2.5 MG: 2.5 TABLET, FILM COATED ORAL at 10:05

## 2021-10-25 RX ADMIN — FUROSEMIDE 40 MG: 10 INJECTION, SOLUTION INTRAMUSCULAR; INTRAVENOUS at 05:21

## 2021-10-25 RX ADMIN — ATORVASTATIN CALCIUM 10 MG: 10 TABLET, FILM COATED ORAL at 21:13

## 2021-10-25 RX ADMIN — ALBUTEROL SULFATE 2 PUFF: 108 AEROSOL, METERED RESPIRATORY (INHALATION) at 03:50

## 2021-10-25 RX ADMIN — METHOCARBAMOL 500 MG: 500 TABLET ORAL at 21:13

## 2021-10-25 RX ADMIN — THERA TABS 1 TABLET: TAB at 10:04

## 2021-10-25 RX ADMIN — METHOCARBAMOL 500 MG: 500 TABLET ORAL at 17:46

## 2021-10-25 RX ADMIN — Medication 10 ML: at 10:00

## 2021-10-25 RX ADMIN — CYANOCOBALAMIN TAB 500 MCG 500 MCG: 500 TAB at 10:05

## 2021-10-25 RX ADMIN — CETIRIZINE HYDROCHLORIDE 5 MG: 10 TABLET, FILM COATED ORAL at 10:04

## 2021-10-25 RX ADMIN — DICYCLOMINE HYDROCHLORIDE 10 MG: 10 CAPSULE ORAL at 10:05

## 2021-10-25 RX ADMIN — TAMSULOSIN HYDROCHLORIDE 0.4 MG: 0.4 CAPSULE ORAL at 10:05

## 2021-10-25 RX ADMIN — Medication 1 TABLET: at 10:05

## 2021-10-25 NOTE — ED PROVIDER NOTES
EMERGENCY DEPARTMENT HISTORY AND PHYSICAL EXAM      Date: 10/25/2021  Patient Name: Arlen Gutierrez    History of Presenting Illness     Chief Complaint   Patient presents with    Generalized Body Aches       History Provided By: Patient, Patient's Daughter and EMS    HPI: Arlen Gutierrez, 80 y.o. female with a past medical history significant hypertension, hyperlipidemia, malignancy, COPD and R lung mass, diverticulitis, c.diff colitis, paroxysmal atrial fibrillation, chf with aicd presents to the ED with cc of pain all over, chest heaviness, shortness of breath. She wears 3 liters NC of O2 at home which keeps her at 93-95%. She was admitted here 9/6-9/13/21 for abdominal pain and shortness of breath, and was again admitted 9/30-10/6/21 and diagnosed with c.diff colitis, and the R lung mass, and then she was made hospice and sent to rehab then home. She presented today as stated above. The daughter states she continues not to eat much, and this has been a known issue since at least September. Patient wanted to revoke hospice because she feels they do not address her pain the way she would like them to, and the daughter called 911. She still wishes to remain a DNR, however. There are no other complaints, changes, or physical findings at this time. PCP: Debby Figueroa MD    No current facility-administered medications on file prior to encounter. Current Outpatient Medications on File Prior to Encounter   Medication Sig Dispense Refill    famotidine (PEPCID) 20 mg tablet Take 1 Tablet by mouth two (2) times a day. 60 Tablet 2    dicyclomine (BENTYL) 10 mg capsule Take 10 mg by mouth every six (6) hours as needed for Abdominal Cramps.  albuterol (PROVENTIL HFA, VENTOLIN HFA, PROAIR HFA) 90 mcg/actuation inhaler Take 2 Puffs by inhalation every six (6) hours as needed for Wheezing.  Indications: chronic obstructive pulmonary disease 1 Inhaler 3    promethazine (PHENERGAN) 25 mg tablet Take 1 Tab by mouth every twelve (12) hours as needed for Nausea. 60 Tab 3    Linzess 72 mcg cap capsule Take 1 capsule by mouth once daily for 90 days 90 Cap 0    arformoteroL (BROVANA) 15 mcg/2 mL nebu neb solution 15 mcg by Nebulization route.  Oxygen 2 Liters at night      methocarbamoL (ROBAXIN) 500 mg tablet Take 1 Tab by mouth four (4) times daily. 360 Tab 3    apixaban (ELIQUIS) 2.5 mg tablet Take 2.5 mg by mouth two (2) times a day.  simvastatin (ZOCOR) 20 mg tablet Take 20 mg by mouth nightly.  cyanocobalamin (VITAMIN B12) 500 mcg tablet Take 500 mcg by mouth daily.  calcium citrate-vitamin d3 (CITRACAL+D) 315 mg-5 mcg (200 unit) tab Take 1 Tab by mouth daily (with breakfast).  Omega-3 Fatty Acids 60- mg cpDR Take  by mouth.  multivitamin (ONE A DAY) tablet Take 1 Tab by mouth daily.  carvediloL (COREG) 3.125 mg tablet Take 3.125 mg by mouth two (2) times daily (with meals).  tamsulosin (Flomax) 0.4 mg capsule Take 0.4 mg by mouth daily.  lidocaine (LIDODERM) 5 % Apply patch to the affected area for 12 hours a day and remove for 12 hours a day. 30 Each 2    cetirizine (ZYRTEC) 5 mg tablet Take 5 mg by mouth daily.  fluticasone propionate (FLONASE ALLERGY RELIEF NA) 1 [facility] by Nasal route.  levocetirizine (XYZAL) 5 mg tablet Take 5 mg by mouth daily.  polyethylene glycol (MIRALAX) 17 gram/dose powder Take 17 g by mouth daily as needed.          Past History     Past Medical History:  Past Medical History:   Diagnosis Date    Arthritis     Asthma     CAD (coronary artery disease)     Heart disease     High cholesterol     Hyperlipidemia        Past Surgical History:  Past Surgical History:   Procedure Laterality Date    HX APPENDECTOMY      HX COLONOSCOPY  01/26/2015    per gi no further colonoscopy needed due to age of the patient, see note 1/26/15    HX HERNIA REPAIR Left 2001    inguinal    HX HYSTERECTOMY      NH CARDIAC SURG PROCEDURE UNLIST  2007    defibrillator placed       Family History:  Family History   Problem Relation Age of Onset    Emphysema Mother        Social History:  Social History     Tobacco Use    Smoking status: Former Smoker    Smokeless tobacco: Never Used   Vaping Use    Vaping Use: Never used   Substance Use Topics    Alcohol use: Not Currently    Drug use: Never       Allergies: Allergies   Allergen Reactions    Codeine Nausea and Vomiting         Review of Systems     Review of Systems   Constitutional: Positive for appetite change. Negative for activity change, fatigue and unexpected weight change. HENT: Negative for hearing loss. Respiratory: Negative for apnea, cough and shortness of breath. Cardiovascular: Positive for chest pain. Negative for palpitations. Gastrointestinal: Negative for nausea and vomiting. Genitourinary: Negative for dysuria. Musculoskeletal: Positive for back pain and myalgias. Negative for neck pain. Skin: Negative for rash. Neurological: Negative for dizziness and headaches. Psychiatric/Behavioral: Negative for confusion and decreased concentration. Physical Exam     Physical Exam  Vitals and nursing note reviewed. Constitutional:       Appearance: Normal appearance. She is cachectic. She is ill-appearing. She is not toxic-appearing. HENT:      Head: Normocephalic and atraumatic. Nose: Nose normal. No congestion or rhinorrhea. Mouth/Throat:      Mouth: Mucous membranes are moist.      Pharynx: No oropharyngeal exudate. Eyes:      Extraocular Movements: Extraocular movements intact. Pupils: Pupils are equal, round, and reactive to light. Cardiovascular:      Rate and Rhythm: Normal rate. Rhythm irregularly irregular. Heart sounds: No murmur heard. No gallop. Pulmonary:      Effort: Pulmonary effort is normal. No respiratory distress. Breath sounds: Examination of the right-lower field reveals rales.  Examination of the left-lower field reveals rales. Rales present. No wheezing. Abdominal:      General: Abdomen is flat. Palpations: Abdomen is soft. Tenderness: There is no abdominal tenderness. Musculoskeletal:         General: No swelling, deformity or signs of injury. Cervical back: Normal range of motion and neck supple. No rigidity. No muscular tenderness. Right lower leg: No edema. Left lower leg: No edema. Lymphadenopathy:      Cervical: No cervical adenopathy. Skin:     General: Skin is warm and dry. Capillary Refill: Capillary refill takes less than 2 seconds. Neurological:      General: No focal deficit present. Mental Status: She is alert and oriented to person, place, and time. Mental status is at baseline. Psychiatric:         Mood and Affect: Mood normal.         Behavior: Behavior normal.         Lab and Diagnostic Study Results     Labs -     Recent Results (from the past 12 hour(s))   CBC WITH AUTOMATED DIFF    Collection Time: 10/25/21  3:28 AM   Result Value Ref Range    WBC 12.0 4.6 - 13.2 K/uL    RBC 3.97 (L) 4.20 - 5.30 M/uL    HGB 12.1 12.0 - 16.0 g/dL    HCT 39.3 35.0 - 45.0 %    MCV 99.0 78.0 - 100.0 FL    MCH 30.5 24.0 - 34.0 PG    MCHC 30.8 (L) 31.0 - 37.0 g/dL    RDW 14.9 (H) 11.6 - 14.5 %    PLATELET 890 218 - 037 K/uL    MPV 10.2 9.2 - 11.8 FL    NRBC 0.0 0.0  WBC    ABSOLUTE NRBC 0.00 0.00 - 0.01 K/uL    NEUTROPHILS 82 (H) 40 - 73 %    LYMPHOCYTES 11 (L) 21 - 52 %    MONOCYTES 7 3 - 10 %    EOSINOPHILS 0 0 - 5 %    BASOPHILS 0 0 - 2 %    IMMATURE GRANULOCYTES 0 0 - 0.5 %    ABS. NEUTROPHILS 9.9 (H) 1.8 - 8.0 K/UL    ABS. LYMPHOCYTES 1.3 0.9 - 3.6 K/UL    ABS. MONOCYTES 0.8 0.05 - 1.2 K/UL    ABS. EOSINOPHILS 0.0 0.0 - 0.4 K/UL    ABS. BASOPHILS 0.0 0.0 - 0.1 K/UL    ABS. IMM.  GRANS. 0.1 (H) 0.00 - 0.04 K/UL    DF AUTOMATED     SARS-COV-2    Collection Time: 10/25/21  3:28 AM   Result Value Ref Range    SARS-CoV-2 Please find results under separate order     METABOLIC PANEL, COMPREHENSIVE    Collection Time: 10/25/21  3:28 AM   Result Value Ref Range    Sodium 135 135 - 145 mmol/L    Potassium 4.4 3.2 - 5.1 mmol/L    Chloride 104 94 - 111 mmol/L    CO2 22 21 - 33 mmol/L    Anion gap 9 mmol/L    Glucose 151 (H) 70 - 110 mg/dL    BUN 11 9 - 21 mg/dL    Creatinine 0.50 (L) 0.70 - 1.20 mg/dL    BUN/Creatinine ratio 22      GFR est AA >60 ml/min/1.73m2    GFR est non-AA >60 ml/min/1.73m2    Calcium 8.5 8.5 - 10.5 mg/dL    Bilirubin, total 0.6 0.2 - 1.0 mg/dL    AST (SGOT) 21 14 - 74 U/L    ALT (SGPT) 9 3 - 52 U/L    Alk.  phosphatase 59 38 - 126 U/L    Protein, total 7.4 6.1 - 8.4 g/dL    Albumin 2.8 (L) 3.5 - 4.7 g/dL    Globulin 4.6 g/dL    A-G Ratio 0.6     TROPONIN I    Collection Time: 10/25/21  3:28 AM   Result Value Ref Range    Troponin-I, Qt. 0.36 (H) 0.02 - 0.05 ng/mL   MAGNESIUM    Collection Time: 10/25/21  3:28 AM   Result Value Ref Range    Magnesium 1.9 1.7 - 2.8 mg/dL   LACTIC ACID    Collection Time: 10/25/21  3:28 AM   Result Value Ref Range    Lactic acid 1.6 0.5 - 2.0 mmol/L   INFLUENZA A & B AG (RAPID TEST)    Collection Time: 10/25/21  3:28 AM   Result Value Ref Range    Influenza A Antigen Negative Negative      Influenza B Antigen Negative Negative     BNP    Collection Time: 10/25/21  3:28 AM   Result Value Ref Range     (H) 0 - 100 pg/mL   EKG, 12 LEAD, INITIAL    Collection Time: 10/25/21  3:28 AM   Result Value Ref Range    Ventricular Rate 67 BPM    Atrial Rate 106 BPM    P-R Interval 158 ms    QRS Duration 158 ms    Q-T Interval 495 ms    QTC Calculation (Bezet) 523 ms    Calculated P Axis 59 degrees    Calculated R Axis 0 degrees    Calculated T Axis -19 degrees    Diagnosis       Atrial-sensed ventricular-paced rhythm  No further analysis attempted due to paced rhythm     URINALYSIS W/ RFLX MICROSCOPIC    Collection Time: 10/25/21  4:25 AM   Result Value Ref Range    Color Dark Yellow      Appearance Clear Specific gravity 1.021 1.005 - 1.030      pH (UA) 6.0 5.0 - 8.0      Protein 100 (A) Negative mg/dL    Glucose Negative Negative mg/dL    Ketone 15 (A) Negative mg/dL    Bilirubin Small (A) Negative      Blood Negative Negative      Urobilinogen 0.2 0.2 - 1.0 EU/dL    Nitrites Negative Negative      Leukocyte Esterase Trace (A) Negative     URINE MICROSCOPIC    Collection Time: 10/25/21  4:25 AM   Result Value Ref Range    WBC 0-4 0 - 4 /hpf    RBC 0-5 0 - 2 /hpf    Epithelial cells Moderate 0 - 20 /lpf    Bacteria Negative (A) None /hpf    Mucus 1+ /lpf    Hyaline cast 10-20 /lpf       Radiologic Studies -   @lastxrresult@  CT Results  (Last 48 hours)               10/25/21 0407  CT ABD PELV WO CONT Final result    Impression:      Small to moderate right pleural effusion with right lung base consolidation   suggesting atelectasis and/or infiltrate. Small left pleural effusion with left lung base atelectasis. Small pericardial effusion. Transverse, descending and sigmoid colon diverticulosis without evidence for   diverticulitis. Small amount of presacral fluid noted. Narrative:  EXAM: CT of the abdomen and pelvis       INDICATION: Pain. COMPARISON: September 30, 2021. TECHNIQUE: Axial CT imaging of the abdomen and pelvis was performed without   intravenous contrast. Multiplanar reformats were generated. Dose reduction   techniques used: automated exposure control, adjustment of the mAs and/or kVp   according to patient size, and iterative reconstruction techniques. Digital   imaging and communications in Medicine (DICOM) format image data are available   to nonaffiliated external healthcare facilities or entities on a secure, media   free, reciprocally searchable basis with patient authorization for at least 12   months after this study.    _______________       FINDINGS:       LOWER CHEST: There is a partially imaged small to moderate right pleural   effusion associated with right lung base consolidation. There is a small left   pleural effusion with mild left lung base consolidation. The heart is enlarged. There is a minimal pericardial effusion. LIVER, BILIARY: Liver is normal. No biliary dilation. Gallbladder is   unremarkable. PANCREAS: Normal.       SPLEEN: Normal.       ADRENALS: Normal.       KIDNEYS: There is a stable cyst mid to lower pole of the left kidney. There is   no hydronephrosis. LYMPH NODES: No enlarged lymph nodes. GASTROINTESTINAL TRACT: There is diverticulosis of the transverse, descending   and sigmoid colon without definitive evidence for diverticulitis. There is   retained stool. PELVIC ORGANS: Unremarkable. VASCULATURE: Unremarkable. BONES: There is mild loss of height of the T12 and L5 vertebral bodies likely   chronic. There is curvature of the thoracic spine to the right. OTHER: There is a small amount of presacral fluid. _______________               CXR Results  (Last 48 hours)    None            Medical Decision Making   - I am the first provider for this patient. - I reviewed the vital signs, available nursing notes, past medical history, past surgical history, family history and social history. - Initial assessment performed. The patients presenting problems have been discussed, and they are in agreement with the care plan formulated and outlined with them. I have encouraged them to ask questions as they arise throughout their visit. Vital Signs-Reviewed the patient's vital signs.   Patient Vitals for the past 12 hrs:   Temp Pulse Resp BP SpO2   10/25/21 0640  78 21 112/65 96 %   10/25/21 0521  83 17 102/72 98 %   10/25/21 0407  84 24 104/72 97 %   10/25/21 0303     95 %   10/25/21 0253 97.6 °F (36.4 °C) (!) 58 20 130/74 96 %       Records Reviewed: Nursing Notes, Old Medical Records, Previous electrocardiograms, Ambulance Run Sheet, Previous Radiology Studies and Previous Laboratory Studies          ED Course:          Provider Notes (Medical Decision Making):     MDM  Number of Diagnoses or Management Options  Diagnosis management comments: 81 yo female with chest pain, \"pain all over\" and mild LLQ abdominal discomfort, self-revoked hospice for failure to thrive, and inoperable R lung mass:  -fentanyl 25mcg iv improved her pain  -trop 0.36  -bnp 700 with mild volume overload on chest x-ray  -flu negative, covid test pending  -LLQ pain, recent hx colitis/diverticulitis, awaiting CT abd/pelvis     510 - [place] - Julronan Dorita will admit for Dr. Jerrica Salas, patient's primary care physician       Amount and/or Complexity of Data Reviewed  Clinical lab tests: ordered and reviewed  Tests in the medicine section of CPT®: ordered and reviewed  Decide to obtain previous medical records or to obtain history from someone other than the patient: yes  Review and summarize past medical records: yes  Independent visualization of images, tracings, or specimens: yes    Risk of Complications, Morbidity, and/or Mortality  Presenting problems: high  Diagnostic procedures: high  Management options: high           Procedures   Medical Decision Makingedical Decision Making  Performed by: Luis Laguerre MD  PROCEDURES:  EKG    Date/Time: 10/25/2021 3:39 AM  Performed by: Nicky Zendejas MD  Authorized by:  Nicky Zendejas MD     ECG reviewed by ED Physician in the absence of a cardiologist: yes    Previous ECG:     Previous ECG:  Compared to current    Similarity:  No change  Interpretation:     Interpretation: abnormal    Rate:     ECG rate:  67    ECG rate assessment: normal    Rhythm:     Rhythm: atrial fibrillation and paced    Pacing:     Capture:  Complete    Type of pacing:  Ventricular  Ectopy:     Ectopy: none    QRS:     QRS axis:  Normal    QRS intervals:  Normal  Conduction:     Conduction: normal    ST segments:     ST segments:  Normal  T waves:     T waves: non-specific    Other findings:     Other findings: poor R wave progression             Disposition   Disposition: Admitted to Floor Medical Floor the case was discussed with the admitting physician        DISCHARGE PLAN:  1. Current Discharge Medication List      CONTINUE these medications which have NOT CHANGED    Details   famotidine (PEPCID) 20 mg tablet Take 1 Tablet by mouth two (2) times a day. Qty: 60 Tablet, Refills: 2    Associated Diagnoses: Gastroesophageal reflux disease without esophagitis      dicyclomine (BENTYL) 10 mg capsule Take 10 mg by mouth every six (6) hours as needed for Abdominal Cramps. tamsulosin (Flomax) 0.4 mg capsule Take 0.4 mg by mouth daily. albuterol (PROVENTIL HFA, VENTOLIN HFA, PROAIR HFA) 90 mcg/actuation inhaler Take 2 Puffs by inhalation every six (6) hours as needed for Wheezing. Indications: chronic obstructive pulmonary disease  Qty: 1 Inhaler, Refills: 3    Associated Diagnoses: Chronic obstructive pulmonary disease, unspecified COPD type (HCC)      lidocaine (LIDODERM) 5 % Apply patch to the affected area for 12 hours a day and remove for 12 hours a day. Qty: 30 Each, Refills: 2    Associated Diagnoses: Trapezius muscle spasm      promethazine (PHENERGAN) 25 mg tablet Take 1 Tab by mouth every twelve (12) hours as needed for Nausea. Qty: 60 Tab, Refills: 3    Associated Diagnoses: Nausea      Linzess 72 mcg cap capsule Take 1 capsule by mouth once daily for 90 days  Qty: 90 Cap, Refills: 0      arformoteroL (BROVANA) 15 mcg/2 mL nebu neb solution 15 mcg by Nebulization route. cetirizine (ZYRTEC) 5 mg tablet Take 5 mg by mouth daily. fluticasone propionate (FLONASE ALLERGY RELIEF NA) 1 Spray by Nasal route. levocetirizine (XYZAL) 5 mg tablet Take 5 mg by mouth daily. Oxygen 2 Liters at night      methocarbamoL (ROBAXIN) 500 mg tablet Take 1 Tab by mouth four (4) times daily.   Qty: 360 Tab, Refills: 3    Associated Diagnoses: Night muscle spasms      apixaban (ELIQUIS) 2.5 mg tablet Take 2.5 mg by mouth two (2) times a day. simvastatin (ZOCOR) 20 mg tablet Take 20 mg by mouth nightly. cyanocobalamin (VITAMIN B12) 500 mcg tablet Take 500 mcg by mouth daily. calcium citrate-vitamin d3 (CITRACAL+D) 315 mg-5 mcg (200 unit) tab Take 1 Tab by mouth daily (with breakfast). Omega-3 Fatty Acids 60- mg cpDR Take  by mouth.      multivitamin (ONE A DAY) tablet Take 1 Tab by mouth daily. polyethylene glycol (MIRALAX) 17 gram/dose powder Take 17 g by mouth daily as needed. carvediloL (COREG) 3.125 mg tablet Take 3.125 mg by mouth two (2) times daily (with meals). 2.   Follow-up Information    None       3. Return to ED if worse   4. Current Discharge Medication List            Diagnosis     Clinical Impression:   1. Acute on chronic combined systolic and diastolic congestive heart failure ([address]    2. Noninfectious gastroenteritis, unspecified type    3. Myalgia        Attestations:    Denita Calero MD    Please note that this dictation was completed with [employer], the computer voice recognition software. Quite often unanticipated grammatical, syntax, homophones, and other interpretive errors are inadvertently transcribed by the computer software. Please disregard these errors. Please excuse any errors that have escaped final proofreading. Thank you.

## 2021-10-25 NOTE — ED TRIAGE NOTES
Patient arrived via EMS on 3L of O2. Patient states:  I've been hurting since around midnight. More so in my back and arms. It's dull and achy. I'm miserable. Daughter states:  I got a call to come see her at home this evening. She was hot and could breathe and said she wanted to go to the hospital.  Hospice came. My mom decided she didn't want hospice anymore so we are here for pain management.

## 2021-10-25 NOTE — H&P
History and Physical    Subjective:     Patricia Henderson is a 80 y.o. female  has a past medical history of Arthritis, Asthma, CAD (coronary artery disease), Heart disease, High cholesterol, and Hyperlipidemia. Patient had been home on hospice with increased pain body aches all over and warm. Told hospice she wanted to revoke and be seen for pain management. Patient was therefore transported to our facility. Evaluated emergency department and found to have an elevated troponin history of A. fib rate controlled with defibrillator and moderately elevated BNP. He was flu negative with full series of Covid vaccinations. She was given 40 mg of Lasix in the ED with some minimal improvement. CT showed some pleural effusion as well as scan evidence of the same on chest x-ray. EKG showed no acute changes. Patient evaluated in room no complaints  other than pain management. Will continue with O2    Will be admitted for diuresis and evaluation of the elevated troponin in addition to pain management. Expected length of stay is 2-3 midnight. Past Medical History:   Diagnosis Date    Arthritis     Asthma     CAD (coronary artery disease)     Heart disease     High cholesterol     Hyperlipidemia       Past Surgical History:   Procedure Laterality Date    HX APPENDECTOMY      HX COLONOSCOPY  01/26/2015    per gi no further colonoscopy needed due to age of the patient, see note 1/26/15    HX HERNIA REPAIR Left 2001    inguinal    HX HYSTERECTOMY      [facility] CARDIAC SURG PROCEDURE UNLIST  2007    defibrillator placed     Family History   Problem Relation Age of Onset    Emphysema Mother       Social History     Tobacco Use    Smoking status: Former Smoker    Smokeless tobacco: Never Used   Substance Use Topics    Alcohol use: Not Currently       Prior to Admission medications    Medication Sig Start Date End Date Taking?  Authorizing Provider   famotidine (PEPCID) 20 mg tablet Take 1 Tablet by mouth two (2) times a day. 10/8/21   Troy Husain MD   dicyclomine (BENTYL) 10 mg capsule Take 10 mg by mouth every six (6) hours as needed for Abdominal Cramps. Other, MD Brigida   tamsulosin (Flomax) 0.4 mg capsule Take 0.4 mg by mouth daily. Provider, Historical   albuterol (PROVENTIL HFA, VENTOLIN HFA, PROAIR HFA) 90 mcg/actuation inhaler Take 2 Puffs by inhalation every six (6) hours as needed for Wheezing. Indications: chronic obstructive pulmonary disease 5/11/21   Troy Husain MD   lidocaine (LIDODERM) 5 % Apply patch to the affected area for 12 hours a day and remove for 12 hours a day. 4/19/21   Troy Husain MD   promethazine (PHENERGAN) 25 mg tablet Take 1 Tab by mouth every twelve (12) hours as needed for Nausea. 4/19/21   Troy Husain MD   Linzess 72 mcg cap capsule Take 1 capsule by mouth once daily for 90 days 3/2/21   Felipa Monique MD   arformoteroL [facility]) 15 mcg/2 mL nebu neb solution 15 mcg by Nebulization route. Provider, Historical   cetirizine (ZYRTEC) 5 mg tablet Take 5 mg by mouth daily. Provider, Historical   fluticasone propionate (FLONASE ALLERGY RELIEF NA) 1 [place] by Nasal route. Provider, Historical   levocetirizine (XYZAL) 5 mg tablet Take 5 mg by mouth daily. Provider, Historical   Oxygen 2 Liters at night    Provider, Historical   methocarbamoL (ROBAXIN) 500 mg tablet Take 1 Tab by mouth four (4) times daily. 1/25/21   Troy Husain MD   apixaban (ELIQUIS) 2.5 mg tablet Take 2.5 mg by mouth two (2) times a day. 6/3/20   Provider, Historical   simvastatin (ZOCOR) 20 mg tablet Take 20 mg by mouth nightly. 6/3/20   Provider, Historical   cyanocobalamin (VITAMIN B12) 500 mcg tablet Take 500 mcg by mouth daily. Provider, Historical   calcium citrate-vitamin d3 (CITRACAL+D) 315 mg-5 mcg (200 unit) tab Take 1 Tab by mouth daily (with breakfast).     Provider, Historical   Omega-3 Fatty Acids 60- mg cpDR Take  by mouth.    Provider, Historical   multivitamin (ONE A DAY) tablet Take 1 Tab by mouth daily. Provider, Historical   polyethylene glycol (MIRALAX) 17 gram/dose powder Take 17 g by mouth daily as needed. Provider, Historical   carvediloL (COREG) 3.125 mg tablet Take 3.125 mg by mouth two (2) times daily (with meals). Provider, Historical     Allergies   Allergen Reactions    Codeine Nausea and Vomiting          REVIEW OF SYSTEMS:       Total of 12 systems reviewed as follows:       POSITIVE= underlined text  Negative = text not underlined  General:  fever, chills, sweats, generalized weakness, weight loss/gain, generalized pain     loss of appetite   Eyes:    blurred vision, eye pain, loss of vision, double vision  ENT:    rhinorrhea, pharyngitis   Respiratory:  cough, sputum production, SOB, MATOS, wheezing, pleuritic pain, on O2  Cardiology:   chest pain, palpitations, orthopnea, PND, edema, syncope   Gastrointestinal:  abdominal pain , N/V, diarrhea, dysphagia, constipation, bleeding   Genitourinary:  frequency, urgency, dysuria, hematuria, incontinence   Muskuloskeletal :  arthralgia, myalgia, back pain  Hematology: easy bruising, nose or gum bleeding, lymphadenopathy   Dermatological: rash, ulceration, pruritis, color change / jaundice  Endocrine:   hot flashes or polydipsia   Neurological:  headache, dizziness, confusion, focal weakness, paresthesia,     Speech difficulties, memory loss, gait difficulty  Psychological: Feelings of anxiety, depression, agitation      Objective:   VITALS:    Visit Vitals  /72   Pulse 83   Temp 97.6 °F (36.4 °C)   Resp 17   Ht 5' 3\" (1.6 m)   Wt 56.7 kg (125 lb)   SpO2 98%   BMI 22.14 kg/m²       PHYSICAL EXAM:    General:    Alert, cooperative, no distress, appears stated age.   Ill appearing     HEENT: Atraumatic, anicteric sclerae, pink conjunctivae     No oral ulcers, mucosa moist, throat clear, dentition fair  Neck:  Supple, symmetrical,  thyroid: non tender  Lungs:   Clear to auscultation bilaterally. No Wheezing or Rhonchi. Noted Rales  Chest wall:  No tenderness  No Accessory muscle use. Heart:   Regular  rhythm,  No  murmur   No edema  Abdomen:   Soft, non-tender. Not distended. Bowel sounds normal  Extremities: No cyanosis. No clubbing,      Skin turgor normal, Capillary refill normal, Radial dial pulse 2+  Skin:     Not pale. Not Jaundiced  No rashes   Psych:  Good insight. Not depressed. Not anxious or agitated. Neurologic: EOMs intact. No facial asymmetry. No aphasia or slurred speech. Symmetrical strength,   Sensation grossly intact. Alert and oriented X 4. Given the patient's current clinical presentation, I have a high level of concern for decompensation if discharged from the emergency department. Complex decision making was performed, which includes reviewing the patient's available past medical records, laboratory results, and x-ray films.        _______________________________________________________________________  Care Plan discussed with:    Comments   Patient X    Family      RN X    Care Manager                    Consultant:      _______________________________________________________________________  Expected  Disposition:   Home with Family X   HH/PT/OT/RN    SNF/LTC    TAMRA    ________________________________________________________________________  TOTAL TIME:  48 Minutes    Critical Care Provided     Minutes non procedure based      Comments    X Reviewed previous records   >50% of visit spent in counseling and coordination of care X Discussion with patient and/or family and questions answered       ________________________________________________________________________    Labs:  Recent Results (from the past 24 hour(s))   CBC WITH AUTOMATED DIFF    Collection Time: 10/25/21  3:28 AM   Result Value Ref Range    WBC 12.0 4.6 - 13.2 K/uL    RBC 3.97 (L) 4.20 - 5.30 M/uL    HGB 12.1 12.0 - 16.0 g/dL    HCT 39.3 35.0 - 45.0 % MCV 99.0 78.0 - 100.0 FL    MCH 30.5 24.0 - 34.0 PG    MCHC 30.8 (L) 31.0 - 37.0 g/dL    RDW 14.9 (H) 11.6 - 14.5 %    PLATELET 229 737 - 976 K/uL    MPV 10.2 9.2 - 11.8 FL    NRBC 0.0 0.0  WBC    ABSOLUTE NRBC 0.00 0.00 - 0.01 K/uL    NEUTROPHILS 82 (H) 40 - 73 %    LYMPHOCYTES 11 (L) 21 - 52 %    MONOCYTES 7 3 - 10 %    EOSINOPHILS 0 0 - 5 %    BASOPHILS 0 0 - 2 %    IMMATURE GRANULOCYTES 0 0 - 0.5 %    ABS. NEUTROPHILS 9.9 (H) 1.8 - 8.0 K/UL    ABS. LYMPHOCYTES 1.3 0.9 - 3.6 K/UL    ABS. MONOCYTES 0.8 0.05 - 1.2 K/UL    ABS. EOSINOPHILS 0.0 0.0 - 0.4 K/UL    ABS. BASOPHILS 0.0 0.0 - 0.1 K/UL    ABS. IMM. GRANS. 0.1 (H) 0.00 - 0.04 K/UL    DF AUTOMATED     SARS-COV-2    Collection Time: 10/25/21  3:28 AM   Result Value Ref Range    SARS-CoV-2 Please find results under separate order     METABOLIC PANEL, COMPREHENSIVE    Collection Time: 10/25/21  3:28 AM   Result Value Ref Range    Sodium 135 135 - 145 mmol/L    Potassium 4.4 3.2 - 5.1 mmol/L    Chloride 104 94 - 111 mmol/L    CO2 22 21 - 33 mmol/L    Anion gap 9 mmol/L    Glucose 151 (H) 70 - 110 mg/dL    BUN 11 9 - 21 mg/dL    Creatinine 0.50 (L) 0.70 - 1.20 mg/dL    BUN/Creatinine ratio 22      GFR est AA >60 ml/min/1.73m2    GFR est non-AA >60 ml/min/1.73m2    Calcium 8.5 8.5 - 10.5 mg/dL    Bilirubin, total 0.6 0.2 - 1.0 mg/dL    AST (SGOT) 21 14 - 74 U/L    ALT (SGPT) 9 3 - 52 U/L    Alk.  phosphatase 59 38 - 126 U/L    Protein, total 7.4 6.1 - 8.4 g/dL    Albumin 2.8 (L) 3.5 - 4.7 g/dL    Globulin 4.6 g/dL    A-G Ratio 0.6     TROPONIN I    Collection Time: 10/25/21  3:28 AM   Result Value Ref Range    Troponin-I, Qt. 0.36 (H) 0.02 - 0.05 ng/mL   MAGNESIUM    Collection Time: 10/25/21  3:28 AM   Result Value Ref Range    Magnesium 1.9 1.7 - 2.8 mg/dL   LACTIC ACID    Collection Time: 10/25/21  3:28 AM   Result Value Ref Range    Lactic acid 1.6 0.5 - 2.0 mmol/L   INFLUENZA A & B AG (RAPID TEST)    Collection Time: 10/25/21  3:28 AM   Result Value Ref Range    Influenza A Antigen Negative Negative      Influenza B Antigen Negative Negative     BNP    Collection Time: 10/25/21  3:28 AM   Result Value Ref Range     (H) 0 - 100 pg/mL   EKG, 12 LEAD, INITIAL    Collection Time: 10/25/21  3:28 AM   Result Value Ref Range    Ventricular Rate 67 BPM    Atrial Rate 106 BPM    P-R Interval 158 ms    QRS Duration 158 ms    Q-T Interval 495 ms    QTC Calculation (Bezet) 523 ms    Calculated P Axis 59 degrees    Calculated R Axis 0 degrees    Calculated T Axis -19 degrees    Diagnosis       Atrial-sensed ventricular-paced rhythm  No further analysis attempted due to paced rhythm     URINALYSIS W/ RFLX MICROSCOPIC    Collection Time: 10/25/21  4:25 AM   Result Value Ref Range    Color Dark Yellow      Appearance Clear      Specific gravity 1.021 1.005 - 1.030      pH (UA) 6.0 5.0 - 8.0      Protein 100 (A) Negative mg/dL    Glucose Negative Negative mg/dL    Ketone 15 (A) Negative mg/dL    Bilirubin Small (A) Negative      Blood Negative Negative      Urobilinogen 0.2 0.2 - 1.0 EU/dL    Nitrites Negative Negative      Leukocyte Esterase Trace (A) Negative     URINE MICROSCOPIC    Collection Time: 10/25/21  4:25 AM   Result Value Ref Range    WBC 0-4 0 - 4 /hpf    RBC 0-5 0 - 2 /hpf    Epithelial cells Moderate 0 - 20 /lpf    Bacteria Negative (A) None /hpf    Mucus 1+ /lpf    Hyaline cast 10-20 /lpf       Imaging:  CT ABD PELV WO CONT    Result Date: 10/25/2021  EXAM: CT of the abdomen and pelvis INDICATION: Pain. COMPARISON: September 30, 2021. TECHNIQUE: Axial CT imaging of the abdomen and pelvis was performed without intravenous contrast. Multiplanar reformats were generated. Dose reduction techniques used: automated exposure control, adjustment of the mAs and/or kVp according to patient size, and iterative reconstruction techniques.  Digital imaging and communications in Medicine (DICOM) format image data are available to nonaffiliated external healthcare facilities or entities on a secure, media free, reciprocally searchable basis with patient authorization for at least 12 months after this study. _______________ FINDINGS: LOWER CHEST: There is a partially imaged small to moderate right pleural effusion associated with right lung base consolidation. There is a small left pleural effusion with mild left lung base consolidation. The heart is enlarged. There is a minimal pericardial effusion. LIVER, BILIARY: Liver is normal. No biliary dilation. Gallbladder is unremarkable. PANCREAS: Normal. SPLEEN: Normal. ADRENALS: Normal. KIDNEYS: There is a stable cyst mid to lower pole of the left kidney. There is no hydronephrosis. LYMPH NODES: No enlarged lymph nodes. GASTROINTESTINAL TRACT: There is diverticulosis of the transverse, descending and sigmoid colon without definitive evidence for diverticulitis. There is retained stool. PELVIC ORGANS: Unremarkable. VASCULATURE: Unremarkable. BONES: There is mild loss of height of the T12 and L5 vertebral bodies likely chronic. There is curvature of the thoracic spine to the right. OTHER: There is a small amount of presacral fluid. _______________     Small to moderate right pleural effusion with right lung base consolidation suggesting atelectasis and/or infiltrate. Small left pleural effusion with left lung base atelectasis. Small pericardial effusion. Transverse, descending and sigmoid colon diverticulosis without evidence for diverticulitis. Small amount of presacral fluid noted. Assessment & Plan:       Elevated troponin  -Initially 0.36 without any chest pain symptoms  -Likely related to A.  Fib  -EKG showed no signs of ischemia  -Magnesium and potassium normal  -Normal rate no tachycardia  -We will trend troponin  -Cardiology consultation  -Echocardiogram pending    Paroxysmal atrial fibrillation (HCC)  -Rate controlled  -Pacemaker intact  -No EKG changes  -We will continue on telemetry    HFrEF (heart failure with reduced ejection fraction) ([facility]  -CT and chest x-ray signs of some effusion  -Continue to diuresis with Lasix  -Daily weights  -Cardiology consultation    Essential hypertension  -Continue with carvedilol and Flomax  -Normotensive on admission  -Continue to monitor    Lung mass  -Chronic/known problem  -No intervention being taken at this time  -O2 3L by NC    Chronic obstructive lung disease ([facility]  -Maintain oxygen saturation above 92%  -NC 3L by NC  -Continue with home inhalers of Brovana and albuterol  -Nebulizers per RT protocol      Code Status: DNR/DNI    Prophylaxis: Continue Eliquis    Electronically Signed : Yayo Gauthier, PhD, PA-C, [place] [address]

## 2021-10-25 NOTE — ROUTINE PROCESS
TRANSFER - OUT REPORT:    Verbal report given to Derian(name) on Aishwarya Sensor  being transferred to [address] [place] [address](unit) for routine progression of care       Report consisted of patients Situation, Background, Assessment and   Recommendations(SBAR). Information from the following report(s) SBAR, ED Summary, MAR, Cardiac Rhythm V Paced and Quality Measures was reviewed with the receiving nurse. Lines:   Peripheral IV 10/25/21 Distal;Posterior;Right Forearm (Active)        Opportunity for questions and clarification was provided.       Patient transported with:   Monitor  O2 @ 3 liters   Belongings

## 2021-10-25 NOTE — ASSESSMENT & PLAN NOTE
-CT and chest x-ray signs of some effusion  -Continue to diuresis with Lasix  -Daily weights  -Cardiology consultation

## 2021-10-25 NOTE — PROGRESS NOTES
Will follow up with OT eval tomorrow as pt not feeling well enough for eval.   Thank you,   Inna Wang Ms, OTR/L

## 2021-10-25 NOTE — ASSESSMENT & PLAN NOTE
-Initially 0.36 without any chest pain symptoms  -Likely related to A.  Fib  -EKG showed no signs of ischemia  -Magnesium and potassium normal  -Normal rate no tachycardia  -We will trend troponin  -Cardiology consultation  -Echocardiogram pending

## 2021-10-25 NOTE — PROGRESS NOTES
Problem: Mobility Impaired (Adult and Pediatric)  Goal: *Acute Goals and Plan of Care (Insert Text)  Description: Physical Therapy Goals  Initiated 10/25/2021 and to be accomplished within 7 day(s)  1. Patient will move from supine to sit and sit to supine , scoot up and down, and roll side to side in bed with supervision/set-up. 2.  Patient will transfer from bed to chair and chair to bed with minimal assistance/contact guard assist using the least restrictive device. 3.  Patient will perform sit to stand with minimal assistance/contact guard assist.  4.  Patient will ambulate with minimal assistance/contact guard assist for 25 feet with the least restrictive device. 5.  Patient will ascend/descend 4 stairs with 2 handrail(s) with minimal assistance/contact guard assist.    PLOF: Community ambulator who used a RW or cane and who was (I) with ADLs. Recently pt has been a household ambulator who used a RW with assist for ADLs from family. Outcome: Progressing Towards Goal   PHYSICAL THERAPY EVALUATION    Patient: Eduarda Hernandez (32 y.o. female)  Date: 10/25/2021  Primary Diagnosis: HFrEF (heart failure with reduced ejection fraction) ([facility] [I50.20]  Elevated troponin [R77.8]        Precautions:   Fall, Other (comment) (Monitor SpO2)    ASSESSMENT :  Based on the objective data described below, the patient presents with elevated troponin and uncontrolled pain. She performs mobility fairly and is unable to transfer to a chair due to feeling lightheaded. Upon initial supine to sit pt is incontinent of stool and urine and returns to bed to be changed, but she is able to assist with rolling. Patient would benefit from further P.T. to improve strength, gait, and stair navigation. They would likely benefit from return to hospice once medically stable. Patient will benefit from skilled intervention to address the above impairments.   Patient's rehabilitation potential is considered to be Poor   Factors which may influence rehabilitation potential include:   []         None noted  [x]         Mental ability/status  []         Medical condition  []         Home/family situation and support systems  []         Safety awareness  []         Pain tolerance/management  []         Other:      PLAN :  Recommendations and Planned Interventions:   [x]           Bed Mobility Training             []    Neuromuscular Re-Education  [x]           Transfer Training                   []    Orthotic/Prosthetic Training  [x]           Gait Training                          []    Modalities  [x]           Therapeutic Exercises           []    Edema Management/Control  [x]           Therapeutic Activities            []    Family Training/Education  [x]           Patient Education  []           Other (comment):    Frequency/Duration: Patient will be followed by physical therapy 1-2 times per day/4-7 days per week to address goals. Discharge Recommendations: Hospice  Further Equipment Recommendations for Discharge: N/A     SUBJECTIVE:   Patient stated I want to try and do something.     OBJECTIVE DATA SUMMARY:     Past Medical History:   Diagnosis Date    Arthritis     Asthma     CAD (coronary artery disease)     Heart disease     High cholesterol     Hyperlipidemia      Past Surgical History:   Procedure Laterality Date    HX APPENDECTOMY      HX COLONOSCOPY  01/26/2015    per gi no further colonoscopy needed due to age of the patient, see note 1/26/15    HX HERNIA REPAIR Left 2001    inguinal    HX HYSTERECTOMY      [facility] CARDIAC SURG PROCEDURE UNLIST  2007    defibrillator placed     Barriers to Learning/Limitations: None  Compensate with: N/A  Home Situation:  Home Situation  Home Environment: Private residence  # Steps to Enter: 4  Rails to Enter: Yes  Hand Rails : Bilateral  One/Two Story Residence: One story  Living Alone: No  Support Systems: Other Family Member(s), Child(reid)  Patient Expects to be Discharged to[de-identified] [employer]  Current DME Used/Available at Home: Walker, rolling, Cane, straight, Oxygen, portable  Critical Behavior:  Neurologic State: Alert  Orientation Level: Oriented X4  Strength:    Strength: Generally decreased, functional  RUE: 4-/5  LUE: 4-/5  RLE: 4-/5  LLE: 4-/5  Tone & Sensation:   Sensation: Intact  Range Of Motion:   AROM: Within functional limits  Posture:  Posture (WDL): Exceptions to WDL  Posture Assessment: Trunk flexion  Functional Mobility:  Bed Mobility:  Rolling: Minimum assistance  Supine to Sit: Minimum assistance  Sit to Supine: Modified independent  Scooting: Minimum assistance  Transfers:  Sit to Stand: Minimum assistance  Stand to Sit: Minimum assistance  Stand Pivot Transfers: (Other) comment (Attempts to transfer but is unable too due to dizziness)     Balance:   Sitting: Intact  Standing: Impaired  Standing - Static: Poor  Standing - Dynamic : Poor  Today's Tx:   Pt works on rolling and is assisted with incontinence care. She attempts standing but is unable to pivot. Pain:  Pain level pre-treatment: 0/10   Pain level post-treatment: 0/10   Pain Location: N/A  Activity Tolerance:   Poor  Please refer to the flowsheet for vital signs taken during this treatment. After treatment:   []         Patient left in no apparent distress sitting up in chair  [x]         Patient left in no apparent distress in bed  [x]         Call bell left within reach  [x]         Nursing notified  []         Caregiver present  []         Bed alarm activated  []         SCDs applied    COMMUNICATION/EDUCATION:   [x]         Role of Physical Therapy in the acute care setting. [x]         Fall prevention education was provided and the patient/caregiver indicated understanding. [x]         Patient/family have participated as able in goal setting and plan of care. [x]         Patient/family agree to work toward stated goals and plan of care.   []         Patient understands intent and goals of therapy, but is neutral about his/her participation. []         Patient is unable to participate in goal setting/plan of care: ongoing with therapy staff.  []         Other:     Thank you for this referral.  Joann Wynn, PT, DPT   Time Calculation: 30 mins

## 2021-10-25 NOTE — PROGRESS NOTES
Received pt w/ eyes closed in bed. Pt difficult to arouse; pt needed both tactile and audible stimulation. Pts lung sounds diminished in all fields. Bowel sounds positive but hypoactive. PP positive. Pts eyes noted to have a hazed appearance to them. Once awaken she can answer to who, what, why and where. Pt has O2 3lpm NC. Pt has right elbow skin tear; cleansed w/ wound cleanser; Xerofoam petroleum dressing applied, covered w/ 4x4 and wrapped w/ gauze. IV access of 22g to the right FA. Pt denies pain or discomfort at this time.

## 2021-10-25 NOTE — PROGRESS NOTES
Reason for Admission: Chart reviewed and noted patient presented to the ER for pain management. Pt had been on Hospice with increased body aches all over and warm. She told hospice she wanted to revoke and be seen for pain management. Pt was therefore transported to our facility. Dx: Elevated Troponin, Paroxysmal Atrial Fibrillation    PMH: Arthritis, Asthma, CAD, Heart Disease, High Cholesterol, HLD                       RUR Score: 20%                 PCP: First and Last name:   Anel Mejia MD     Name of Practice:    Are you a current patient: Yes/No:    Approximate date of last visit:    Can you participate in a virtual visit if needed:     Do you (patient/family) have any concerns for transition/discharge? No                 Plan for utilizing home health: TBD      Current Advanced Directive/Advance Care Plan:  DNR      Healthcare Decision Maker: Diane Stephens  Click here to [address] including selection of the Healthcare Decision Maker Relationship (ie \"Primary\")              Transition of Care Plan: TBD    Care Management Interventions  PCP Verified by CM: Yes  Transition of Care Consult (CM Consult):  Discharge Planning  Current Support Network: Daughter- Diane Stephens- [phone]. Patient's friend lives with pt. She has a cane, rollator and Home 02 @ 3 LPM via NC. She was receiving Hospice with [facility], but has decided that she doesn't want Hospice any longer. She would like to [facility] with [facility]. She has chosen [facility]. She will be returning back home with [facility] at discharge.

## 2021-10-25 NOTE — ASSESSMENT & PLAN NOTE
-Maintain oxygen saturation above 92%  -Continue with home inhalers of Brovana and albuterol  -Nebulizers per RT protocol

## 2021-10-25 NOTE — CONSULTS
[facility]  CARDIOLOGY CONSULTATION    REASON FOR CONSULT: Elevated troponin    REQUESTING PROVIDER: CAMILLE Ballard    CHIEF COMPLAINT: Back pain    HISTORY OF PRESENT ILLNESS:  Eduarda Hernandez is a 80y.o. year-old female with past medical history significant for HTN, hyperlipidemia, right lung mass, COPD, paroxysmal atrial fibrillation, and nonischemic cardiomyopathy who was seen today for evaluation of elevated troponins. The patient presented to the [facility] ER this morning for evaluation of back pain. She is currently a hospice patient for the right lung mass, but decided to revoke hospice services due to uncontrolled back and arm pain and presented to the ER for further evaluation and treatment of pain. She was treated with fentanyl 25 mcg in the ER and Lasix 40 mg IV due to findings of pleural effusions. Consultation was requested due to elevated troponins on presentation. COVID-19 rapid testing is pending. Records from hospital admission course thus far reviewed. Telemetry reviewed. No events overnight. The patient is in a v-paced rhythm. INPATIENT MEDICATIONS:  Home medications reviewed.     Current Facility-Administered Medications:     albuterol (PROVENTIL HFA, VENTOLIN HFA, PROAIR HFA) inhaler 2 Puff, 2 Puff, Inhalation, Q6H PRN, CAMILLE Bess    apixaban (ELIQUIS) tablet 2.5 mg, 2.5 mg, Oral, BID, CAMILLE Bess, 2.5 mg at 10/25/21 1005    calcium-vitamin D (OS-MOOK +D3) 250 mg-125 unit per tablet 1 Tablet, 1 Tablet, Oral, DAILY WITH BREAKFAST, Daylin Naranjo, [address], 1 Tablet at 10/25/21 1005    carvediloL (COREG) tablet 3.125 mg, 3.125 mg, Oral, BID WITH MEALS, CAMILLE Bess, 3.125 mg at 10/25/21 1005    cetirizine (ZYRTEC) tablet 5 mg, 5 mg, Oral, DAILY, CAMILLE Bess, 5 mg at 10/25/21 1004    cyanocobalamin (VITAMIN B12) tablet 500 mcg, 500 mcg, Oral, DAILY, CAMILLE Bess, 500 mcg at 10/25/21 1005    dicyclomine (BENTYL) capsule 10 mg, 10 mg, Oral, Q6H PRN, CAMILLE Shi, 10 mg at 10/25/21 1005    famotidine (PEPCID) tablet 20 mg, 20 mg, Oral, BID, CAMILLE Shi, 20 mg at 10/25/21 1005    linaCLOtide (LINZESS) caspule 72 mcg- PT MUST SUPPLY (Patient Supplied), 72 mcg, Oral, DAILY, CAMILLE Shi    methocarbamoL (ROBAXIN) tablet 500 mg, 500 mg, Oral, QID, CAMILLE Shi, 500 mg at 10/25/21 1222    therapeutic multivitamin (THERAGRAN) tablet 1 Tablet, 1 Tablet, Oral, DAILY, CAMILLE Shi, 1 Tablet at 10/25/21 1004    atorvastatin (LIPITOR) tablet 10 mg, 10 mg, Oral, QHS, CAMILLE Shi    tamsulosin (FLOMAX) capsule 0.4 mg, 0.4 mg, Oral, DAILY, CAMILLE Shi, 0.4 mg at 10/25/21 1005    sodium chloride (NS) flush 5-40 mL, 5-40 mL, IntraVENous, Q8H, CAMILLE Mosley, 10 mL at 10/25/21 1359    sodium chloride (NS) flush 5-40 mL, 5-40 mL, IntraVENous, PRN, CAMILLE Shi    acetaminophen (TYLENOL) tablet 650 mg, 650 mg, Oral, Q6H PRN **OR** acetaminophen (TYLENOL) suppository 650 mg, 650 mg, Rectal, Q6H PRN, CAMILLE Shi    polyethylene glycol (MIRALAX) packet 17 g, 17 g, Oral, DAILY PRN, CAMILLE Shi    promethazine (PHENERGAN) tablet 12.5 mg, 12.5 mg, Oral, Q6H PRN **OR** ondansetron (ZOFRAN) injection 4 mg, 4 mg, IntraVENous, Q6H PRN, CAMILLE Mosley    influenza vaccine 2021-22 (6 mos+)(PF) (FLUARIX/FLULAVAL/FLUZONE QUAD) injection 0.5 mL, 1 Each, IntraMUSCular, PRIOR TO DISCHARGE, Ivon Johnson MD    budesonide-formoterol [facility]) 80-4.5 mcg inhaler, 2 Puff, Inhalation, BID RT, Markus Ramon., MD    traMADoL Markus Whitehead) tablet 50 mg, 50 mg, Oral, Q6H PRN, Carlos WELLINGTON, PA     ALLERGIES:  Allergies reviewed with the patient,  Allergies   Allergen Reactions    Codeine Nausea and Vomiting    . FAMILY HISTORY:  Family history reviewed. SOCIAL HISTORY:  Notable for former tobacco use, no alcohol use, and no illicit drug use.       REVIEW OF SYSTEMS: Complete review of systems performed, pertinents noted above, all other systems are negative. PHYSICAL EXAMINATION:  Vital sign assessment reveal a blood pressure of 121/70 and pulse rate of 72. Cardiovascular exam has a heart with a regular rate and rhythm on telemetry. Respiratory exam reveals 3L oxygen use. Neurologic exam is nonfocal.  Further exam was deferred due to COVID-19 crisis. Recent labs results and imaging reviewed. Notable findings include:   Recent Results (from the past 24 hour(s))   CBC WITH AUTOMATED DIFF    Collection Time: 10/25/21  3:28 AM   Result Value Ref Range    WBC 12.0 4.6 - 13.2 K/uL    RBC 3.97 (L) 4.20 - 5.30 M/uL    HGB 12.1 12.0 - 16.0 g/dL    HCT 39.3 35.0 - 45.0 %    MCV 99.0 78.0 - 100.0 FL    MCH 30.5 24.0 - 34.0 PG    MCHC 30.8 (L) 31.0 - 37.0 g/dL    RDW 14.9 (H) 11.6 - 14.5 %    PLATELET 475 295 - 402 K/uL    MPV 10.2 9.2 - 11.8 FL    NRBC 0.0 0.0  WBC    ABSOLUTE NRBC 0.00 0.00 - 0.01 K/uL    NEUTROPHILS 82 (H) 40 - 73 %    LYMPHOCYTES 11 (L) 21 - 52 %    MONOCYTES 7 3 - 10 %    EOSINOPHILS 0 0 - 5 %    BASOPHILS 0 0 - 2 %    IMMATURE GRANULOCYTES 0 0 - 0.5 %    ABS. NEUTROPHILS 9.9 (H) 1.8 - 8.0 K/UL    ABS. LYMPHOCYTES 1.3 0.9 - 3.6 K/UL    ABS. MONOCYTES 0.8 0.05 - 1.2 K/UL    ABS. EOSINOPHILS 0.0 0.0 - 0.4 K/UL    ABS. BASOPHILS 0.0 0.0 - 0.1 K/UL    ABS. IMM.  GRANS. 0.1 (H) 0.00 - 0.04 K/UL    DF AUTOMATED     SARS-COV-2    Collection Time: 10/25/21  3:28 AM   Result Value Ref Range    SARS-CoV-2 Please find results under separate order     METABOLIC PANEL, COMPREHENSIVE    Collection Time: 10/25/21  3:28 AM   Result Value Ref Range    Sodium 135 135 - 145 mmol/L    Potassium 4.4 3.2 - 5.1 mmol/L    Chloride 104 94 - 111 mmol/L    CO2 22 21 - 33 mmol/L    Anion gap 9 mmol/L    Glucose 151 (H) 70 - 110 mg/dL    BUN 11 9 - 21 mg/dL    Creatinine 0.50 (L) 0.70 - 1.20 mg/dL    BUN/Creatinine ratio 22      GFR est AA >60 ml/min/1.73m2    GFR est non-AA >60 ml/min/1.73m2    Calcium 8.5 8.5 - 10.5 mg/dL    Bilirubin, total 0.6 0.2 - 1.0 mg/dL    AST (SGOT) 21 14 - 74 U/L    ALT (SGPT) 9 3 - 52 U/L    Alk.  phosphatase 59 38 - 126 U/L    Protein, total 7.4 6.1 - 8.4 g/dL    Albumin 2.8 (L) 3.5 - 4.7 g/dL    Globulin 4.6 g/dL    A-G Ratio 0.6     TROPONIN I    Collection Time: 10/25/21  3:28 AM   Result Value Ref Range    Troponin-I, Qt. 0.36 (H) 0.02 - 0.05 ng/mL   MAGNESIUM    Collection Time: 10/25/21  3:28 AM   Result Value Ref Range    Magnesium 1.9 1.7 - 2.8 mg/dL   LACTIC ACID    Collection Time: 10/25/21  3:28 AM   Result Value Ref Range    Lactic acid 1.6 0.5 - 2.0 mmol/L   INFLUENZA A & B AG (RAPID TEST)    Collection Time: 10/25/21  3:28 AM   Result Value Ref Range    Influenza A Antigen Negative Negative      Influenza B Antigen Negative Negative     BNP    Collection Time: 10/25/21  3:28 AM   Result Value Ref Range     (H) 0 - 100 pg/mL   EKG, 12 LEAD, INITIAL    Collection Time: 10/25/21  3:28 AM   Result Value Ref Range    Ventricular Rate 67 BPM    Atrial Rate 106 BPM    P-R Interval 158 ms    QRS Duration 158 ms    Q-T Interval 495 ms    QTC Calculation (Bezet) 523 ms    Calculated P Axis 59 degrees    Calculated R Axis 0 degrees    Calculated T Axis -19 degrees    Diagnosis       Atrial-sensed ventricular-paced rhythm  No further analysis attempted due to paced rhythm    Confirmed by MARIAA Woodard ([id number]) on 10/25/2021 12:49:58 PM     URINALYSIS W/ RFLX MICROSCOPIC    Collection Time: 10/25/21  4:25 AM   Result Value Ref Range    Color Dark Yellow      Appearance Clear      Specific gravity 1.021 1.005 - 1.030      pH (UA) 6.0 5.0 - 8.0      Protein 100 (A) Negative mg/dL    Glucose Negative Negative mg/dL    Ketone 15 (A) Negative mg/dL    Bilirubin Small (A) Negative      Blood Negative Negative      Urobilinogen 0.2 0.2 - 1.0 EU/dL    Nitrites Negative Negative      Leukocyte Esterase Trace (A) Negative     URINE MICROSCOPIC    Collection Time: 10/25/21  4:25 AM   Result Value Ref Range    WBC 0-4 0 - 4 /hpf    RBC 0-5 0 - 2 /hpf    Epithelial cells Moderate 0 - 20 /lpf    Bacteria Negative (A) None /hpf    Mucus 1+ /lpf    Hyaline cast 10-20 /lpf   COVID-19 RAPID TEST    Collection Time: 10/25/21  6:15 AM   Result Value Ref Range    Specimen source Nasopharyngeal      COVID-19 rapid test Not Detected Not Detected     TROPONIN I    Collection Time: 10/25/21  6:54 AM   Result Value Ref Range    Troponin-I, Qt. 0.65 (H) 0.02 - 0.05 ng/mL   TROPONIN I    Collection Time: 10/25/21 12:30 PM   Result Value Ref Range    Troponin-I, Qt. 0.67 (H) 0.02 - 0.05 ng/mL     Discussed case with Dr. Jony Stewart, and our impression and recommendations are as follows:  1. Elevated troponins: Troponins are elevated as follows: 0.36-->0.65 currently. Additional troponins are pending. EKG is nonischemic. Troponin elevation is likely due to right lung mass and acute heart failure in the setting of nonischemic cardiomyopathy. Will defer aspirin therapy for now due to Eliquis use. Will continue beta blocker. Will obtain an echocardiogram to assess overall cardiac structure and function. The patient will need outpatient ischemic evaluation. 2. Acute heart failure with preserved EF: Bilateral pleural effusions on abdomen/pelvis CT. . She has been diuresed with Lasix 40 mg IV in the ER. Will reassess need for additional Lasix tomorrow. Will obtain echo as above. Continue telemetry monitoring. Keep serum potassium between 4-5 and serum magnesium > 2.  3. Paroxysmal atrial fibrillation: Her underlying rhythm is sinus. Continue carvedilol for rate control. Continue Eliuqis for anticoagulation. Continue telemetry monitoring. Keep serum potassium between 4-5 and serum magnesium > 2. Thank you for involving us in the care of this patient. Please do not hesitate to call me or Dr. Jony Stewart if additional questions arise.  If assistance is needed after hours or on weekends, please call the answering service at [phone].

## 2021-10-26 LAB
ANION GAP SERPL CALC-SCNC: 8 MMOL/L
BASOPHILS # BLD: 0 K/UL (ref 0–0.1)
BASOPHILS NFR BLD: 0 % (ref 0–2)
BUN SERPL-MCNC: 19 MG/DL (ref 9–21)
BUN/CREAT SERPL: 38
CA-I BLD-MCNC: 8.2 MG/DL (ref 8.5–10.5)
CHLORIDE SERPL-SCNC: 101 MMOL/L (ref 94–111)
CO2 SERPL-SCNC: 28 MMOL/L (ref 21–33)
CREAT SERPL-MCNC: 0.5 MG/DL (ref 0.7–1.2)
DIFFERENTIAL METHOD BLD: ABNORMAL
ECHO AO ASC DIAM: 4 CM
ECHO AO ROOT DIAM: 3 CM
ECHO AR MAX VEL PISA: 351 CM/S
ECHO AV AREA PEAK VELOCITY: 3.26 CM2
ECHO AV AREA PLAN/BSA: 2.19
ECHO AV AREA VTI: 3.46 CM2
ECHO AV AREA/BSA PEAK VELOCITY: 2.1 CM2/M2
ECHO AV AREA/BSA VTI: 2.2 CM2/M2
ECHO AV CUSP MM: 1.8 CM
ECHO AV MEAN GRADIENT: 4 MMHG
ECHO AV MEAN VELOCITY: 84.7 CM/S
ECHO AV PEAK GRADIENT: 6 MMHG
ECHO AV PEAK VELOCITY: 121 CM/S
ECHO AV REGURGITANT PHT: 692 MS
ECHO AV VTI: 19.2 CM
ECHO EST RA PRESSURE: 8 MMHG
ECHO LA AREA 2C: 17.5 CM2
ECHO LA AREA 4C: 15.4 CM2
ECHO LA MAJOR AXIS: 4 CM
ECHO LA MAJOR AXIS: 5.44 CM
ECHO LA TO AORTIC ROOT RATIO: 1.33
ECHO LV EDV A2C: 68.9 CM3
ECHO LV EJECTION FRACTION BIPLANE: 60.1 % (ref 55–100)
ECHO LV ESV A2C: 22 CM3
ECHO LV INTERNAL DIMENSION DIASTOLIC: 4.1 CM (ref 3.9–5.3)
ECHO LV INTERNAL DIMENSION SYSTOLIC: 2.8 CM
ECHO LV IVSD: 0.9 CM (ref 0.6–0.9)
ECHO LV MASS 2D: 123 G (ref 67–162)
ECHO LV MASS INDEX 2D: 77.8 G/M2 (ref 43–95)
ECHO LV POSTERIOR WALL DIASTOLIC: 1 CM (ref 0.6–0.9)
ECHO LVOT DIAM: 2.1 CM
ECHO LVOT PEAK GRADIENT: 5 MMHG
ECHO LVOT PEAK VELOCITY: 114 CM/S
ECHO LVOT SV: 66 CM3
ECHO LVOT VTI: 19.2 CM
ECHO LVOT VTI: 19.2 CM
ECHO RA AREA 4C: 15.3 CM2
ECHO RA MAJOR AXIS: 5.13 CM
ECHO RA MINOR AXIS: 4.1 CM
ECHO RIGHT VENTRICULAR SYSTOLIC PRESSURE (RVSP): 37 MMHG
ECHO RV INTERNAL DIMENSION: 3.1 CM
ECHO RV TAPSE: 1.63 CM (ref 1.5–2)
ECHO TV MEAN GRADIENT: 29 MMHG
ECHO TV REGURGITANT MAX VELOCITY: 271 CM/S
EOSINOPHIL # BLD: 0 K/UL (ref 0–0.4)
EOSINOPHIL NFR BLD: 0 % (ref 0–5)
ERYTHROCYTE [DISTWIDTH] IN BLOOD BY AUTOMATED COUNT: 15 % (ref 11.6–14.5)
GLUCOSE SERPL-MCNC: 127 MG/DL (ref 70–110)
HCT VFR BLD AUTO: 36.1 % (ref 35–45)
HGB BLD-MCNC: 11.4 G/DL (ref 12–16)
IMM GRANULOCYTES # BLD AUTO: 0 K/UL (ref 0–0.04)
IMM GRANULOCYTES NFR BLD AUTO: 0 % (ref 0–0.5)
LA VOL DISK BP: 35.2 CM3 (ref 22–52)
LVOT MG: 3 MMHG
LYMPHOCYTES # BLD: 1 K/UL (ref 0.9–3.6)
LYMPHOCYTES NFR BLD: 12 % (ref 21–52)
MCH RBC QN AUTO: 30.7 PG (ref 24–34)
MCHC RBC AUTO-ENTMCNC: 31.6 G/DL (ref 31–37)
MCV RBC AUTO: 97.3 FL (ref 78–100)
MONOCYTES # BLD: 0.7 K/UL (ref 0.05–1.2)
MONOCYTES NFR BLD: 8 % (ref 3–10)
NEUTS SEG # BLD: 6.9 K/UL (ref 1.8–8)
NEUTS SEG NFR BLD: 80 % (ref 40–73)
NRBC # BLD: 0 K/UL (ref 0–0.01)
NRBC BLD-RTO: 0 PER 100 WBC
PLATELET # BLD AUTO: 330 K/UL (ref 135–420)
PMV BLD AUTO: 10.7 FL (ref 9.2–11.8)
POTASSIUM SERPL-SCNC: 3.7 MMOL/L (ref 3.2–5.1)
RBC # BLD AUTO: 3.71 M/UL (ref 4.2–5.3)
SODIUM SERPL-SCNC: 137 MMOL/L (ref 135–145)
TROPONIN I SERPL-MCNC: 0.24 NG/ML (ref 0.02–0.05)
WBC # BLD AUTO: 8.7 K/UL (ref 4.6–13.2)

## 2021-10-26 PROCEDURE — 94640 AIRWAY INHALATION TREATMENT: CPT

## 2021-10-26 PROCEDURE — 74011250637 HC RX REV CODE- 250/637: Performed by: PHYSICIAN ASSISTANT

## 2021-10-26 PROCEDURE — 74011250637 HC RX REV CODE- 250/637: Performed by: NURSE PRACTITIONER

## 2021-10-26 PROCEDURE — 97530 THERAPEUTIC ACTIVITIES: CPT

## 2021-10-26 PROCEDURE — 94761 N-INVAS EAR/PLS OXIMETRY MLT: CPT

## 2021-10-26 PROCEDURE — 85025 COMPLETE CBC W/AUTO DIFF WBC: CPT

## 2021-10-26 PROCEDURE — 74011250637 HC RX REV CODE- 250/637: Performed by: INTERNAL MEDICINE

## 2021-10-26 PROCEDURE — 80048 BASIC METABOLIC PNL TOTAL CA: CPT

## 2021-10-26 PROCEDURE — 84484 ASSAY OF TROPONIN QUANT: CPT

## 2021-10-26 PROCEDURE — 36415 COLL VENOUS BLD VENIPUNCTURE: CPT

## 2021-10-26 PROCEDURE — 77010033678 HC OXYGEN DAILY

## 2021-10-26 PROCEDURE — 65270000029 HC RM PRIVATE

## 2021-10-26 RX ORDER — FUROSEMIDE 40 MG/1
40 TABLET ORAL DAILY
Status: DISCONTINUED | OUTPATIENT
Start: 2021-10-26 | End: 2021-10-27 | Stop reason: HOSPADM

## 2021-10-26 RX ADMIN — METHOCARBAMOL 500 MG: 500 TABLET ORAL at 17:17

## 2021-10-26 RX ADMIN — APIXABAN 2.5 MG: 2.5 TABLET, FILM COATED ORAL at 09:10

## 2021-10-26 RX ADMIN — BUDESONIDE AND FORMOTEROL FUMARATE DIHYDRATE 2 PUFF: 80; 4.5 AEROSOL RESPIRATORY (INHALATION) at 20:36

## 2021-10-26 RX ADMIN — FAMOTIDINE 20 MG: 20 TABLET ORAL at 09:11

## 2021-10-26 RX ADMIN — TAMSULOSIN HYDROCHLORIDE 0.4 MG: 0.4 CAPSULE ORAL at 09:11

## 2021-10-26 RX ADMIN — METHOCARBAMOL 500 MG: 500 TABLET ORAL at 15:04

## 2021-10-26 RX ADMIN — APIXABAN 2.5 MG: 2.5 TABLET, FILM COATED ORAL at 17:17

## 2021-10-26 RX ADMIN — METHOCARBAMOL 500 MG: 500 TABLET ORAL at 22:04

## 2021-10-26 RX ADMIN — CARVEDILOL 3.12 MG: 3.12 TABLET, FILM COATED ORAL at 17:17

## 2021-10-26 RX ADMIN — CARVEDILOL 3.12 MG: 3.12 TABLET, FILM COATED ORAL at 09:10

## 2021-10-26 RX ADMIN — THERA TABS 1 TABLET: TAB at 09:11

## 2021-10-26 RX ADMIN — ATORVASTATIN CALCIUM 10 MG: 10 TABLET, FILM COATED ORAL at 22:04

## 2021-10-26 RX ADMIN — FAMOTIDINE 20 MG: 20 TABLET ORAL at 17:17

## 2021-10-26 RX ADMIN — FUROSEMIDE 40 MG: 40 TABLET ORAL at 15:05

## 2021-10-26 RX ADMIN — Medication 1 TABLET: at 09:11

## 2021-10-26 RX ADMIN — CYANOCOBALAMIN TAB 500 MCG 500 MCG: 500 TAB at 09:11

## 2021-10-26 RX ADMIN — BUDESONIDE AND FORMOTEROL FUMARATE DIHYDRATE 2 PUFF: 80; 4.5 AEROSOL RESPIRATORY (INHALATION) at 08:19

## 2021-10-26 RX ADMIN — CETIRIZINE HYDROCHLORIDE 5 MG: 10 TABLET, FILM COATED ORAL at 09:10

## 2021-10-26 RX ADMIN — Medication 10 ML: at 22:04

## 2021-10-26 RX ADMIN — Medication 10 ML: at 05:06

## 2021-10-26 RX ADMIN — Medication 10 ML: at 15:05

## 2021-10-26 RX ADMIN — METHOCARBAMOL 500 MG: 500 TABLET ORAL at 09:11

## 2021-10-26 NOTE — PROGRESS NOTES
Pt has slept at long intervals this shift with no complaints verbalized. Pt turned and repositioned for comfort. Pt had 2 incontinent bowel movements. Safety maintained.

## 2021-10-26 NOTE — PROGRESS NOTES
Comprehensive Nutrition Assessment    Type and Reason for Visit: Initial    Nutrition Recommendations/Plan: cardiac restricted diet with ensure plus BID    Nutrition Assessment:  79 yo female PMH: arhtiritis, asthma, CAD, heart disease, high cholesterol   Pt was home on hospice having increased body aches then revoked hospice and came here for pain management. Also being treated for HF with decreased ejection fracture. Started on regular diet due to hospice but since pt has revoked hospice recommend modify to cardiac diet due to heart failure.      Recent Results (from the past 24 hour(s))   ECHO ADULT COMPLETE    Collection Time: 10/25/21  6:52 PM   Result Value Ref Range    Left Atrium Major Axis 5.44 cm    LA Area 2C 17.50 cm2    LA Area 4C 15.40 cm2    LA Volume DISK BP 35.20 22.0 - 52.0 cm3    Left Atrium Major Axis 4.00 cm    Left Atrium to Aortic Root Ratio 1.33     Right Atrial Area 4C 15.30 cm2    Right Atrium Major Axis 5.13 cm    Est. RA Pressure 8.00 mmHg    Right Atrium Minor Axis 4.10 cm    Aortic Regurgitant Pressure Half-time 692.00 ms    AR Max Mehrdad 351.00 cm/s    Aortic Valve Systolic Peak Velocity 385.72 cm/s    AoV PG 6.00 mmHg    AV Cusp 1.80 cm    Aortic Valve Systolic Mean Gradient 3.68 mmHg    AoV VTI 19.20 cm    Aortic valve mean velocity 84.70 cm/s    Aortic Valve Area by Continuity of VTI 3.46 cm2    Aortic Valve Area by Continuity of Peak Velocity 3.26 cm2    GIOVANNI/BSA 2.19     Ao Root D 3.00 cm    AO ASC D 4.00 cm    IVSd 0.90 0.60 - 0.90 cm    LVIDd 4.10 3.90 - 5.30 cm    LVIDs 2.80 cm    LVOT d 2.10 cm    Left Ventricular Outflow Tract Mean Gradient 3.00 mmHg    LVOT VTI 19.20 cm    LVOT VTI 19.20 cm    LVOT Peak Velocity 114.00 cm/s    LVOT Peak Gradient 5.00 mmHg    LVPWd 1.00 (A) 0.60 - 0.90 cm    LV ED Vol A2C 68.90 cm3    LV ES Vol A2C 22.00 cm3    LVOT SV 66.0 cm3    RVIDd 3.10 cm    RVSP 37.00 mmHg    Tapse 1.63 1.50 - 2.00 cm    TR Max Velocity 271.00 cm/s    TV MG 29.00 mmHg BP EF 60.1 55.0 - 100.0 %    LV Mass .0 67.0 - 162.0 g    LV Mass AL Index 77.8 43.0 - 95.0 g/m2    GIOVANNI/BSA Pk Mehrdad 2.1 cm2/m2    GIOVANNI/BSA VTI 2.2 DJ5/Y9   METABOLIC PANEL, BASIC    Collection Time: 10/26/21  4:20 AM   Result Value Ref Range    Sodium 137 135 - 145 mmol/L    Potassium 3.7 3.2 - 5.1 mmol/L    Chloride 101 94 - 111 mmol/L    CO2 28 21 - 33 mmol/L    Anion gap 8 mmol/L    Glucose 127 (H) 70 - 110 mg/dL    BUN 19 9 - 21 mg/dL    Creatinine 0.50 (L) 0.70 - 1.20 mg/dL    BUN/Creatinine ratio 38      GFR est AA >60 ml/min/1.73m2    GFR est non-AA >60 ml/min/1.73m2    Calcium 8.2 (L) 8.5 - 10.5 mg/dL   CBC WITH AUTOMATED DIFF    Collection Time: 10/26/21  4:20 AM   Result Value Ref Range    WBC 8.7 4.6 - 13.2 K/uL    RBC 3.71 (L) 4.20 - 5.30 M/uL    HGB 11.4 (L) 12.0 - 16.0 g/dL    HCT 36.1 35.0 - 45.0 %    MCV 97.3 78.0 - 100.0 FL    MCH 30.7 24.0 - 34.0 PG    MCHC 31.6 31.0 - 37.0 g/dL    RDW 15.0 (H) 11.6 - 14.5 %    PLATELET 215 682 - 755 K/uL    MPV 10.7 9.2 - 11.8 FL    NRBC 0.0 0.0  WBC    ABSOLUTE NRBC 0.00 0.00 - 0.01 K/uL    NEUTROPHILS 80 (H) 40 - 73 %    LYMPHOCYTES 12 (L) 21 - 52 %    MONOCYTES 8 3 - 10 %    EOSINOPHILS 0 0 - 5 %    BASOPHILS 0 0 - 2 %    IMMATURE GRANULOCYTES 0 0 - 0.5 %    ABS. NEUTROPHILS 6.9 1.8 - 8.0 K/UL    ABS. LYMPHOCYTES 1.0 0.9 - 3.6 K/UL    ABS. MONOCYTES 0.7 0.05 - 1.2 K/UL    ABS. EOSINOPHILS 0.0 0.0 - 0.4 K/UL    ABS. BASOPHILS 0.0 0.0 - 0.1 K/UL    ABS. IMM.  GRANS. 0.0 0.00 - 0.04 K/UL    DF AUTOMATED     TROPONIN I    Collection Time: 10/26/21  4:20 AM   Result Value Ref Range    Troponin-I, Qt. 0.24 (H) 0.02 - 0.05 ng/mL       Malnutrition Assessment:  Malnutrition Status:  Insufficient data (COVID rule out)    Context:  Acute illness     Findings of the 6 clinical characteristics of malnutrition:   Energy Intake:  Mild decrease in energy intake (specify) (51-75% of meals)  Weight Loss:  No significant weight loss     Body Fat Loss:  Unable to assess,     Muscle Mass Loss:  Unable to assess,    Fluid Accumulation:  Unable to assess,     Strength:  Not performed         Estimated Daily Nutrient Needs:  Energy (kcal): 7548-8617 kcal/day; Weight Used for Energy Requirements: Admission (55 kg)  Protein (g): 55-66 g/day; Weight Used for Protein Requirements: Admission (1-1.2 g/kg)  Fluid (ml/day): 4172-9130 mL/day; Method Used for Fluid Requirements: 1 ml/kcal      Nutrition Related Findings:  Pt was home on hospice having increased body aches then revoked hospice and came here for pain management. Also being treated for HF with decreased ejection fracture.       Wounds:    None       Current Nutrition Therapies:  ADULT DIET Regular    Anthropometric Measures:  · Height:  5' 3\" (160 cm)  · Current Body Wt:  54.4 kg (120 lb)   · Admission Body Wt:  120 lb    · Usual Body Wt:        · Ideal Body Wt:  115 lbs:  104.3 %   · Adjusted Body Weight:   ; Weight Adjustment for: No adjustment   · Adjusted BMI:       · BMI Category:  Underweight (BMI less than 22) age over 72       Nutrition Diagnosis:   · Inadequate oral intake related to impaired respiratory function as evidenced by intake 51-75%      Nutrition Interventions:   Food and/or Nutrient Delivery: Modify current diet, Start oral nutrition supplement  Nutrition Education and Counseling: Education not indicated  Coordination of Nutrition Care: Continue to monitor while inpatient    Goals:  Pt to eat > 75% of meals, BM q 1-3 days, BMI 22-25 for adults > 73 yo       Nutrition Monitoring and Evaluation:   Behavioral-Environmental Outcomes:    Food/Nutrient Intake Outcomes: Food and nutrient intake, Supplement intake  Physical Signs/Symptoms Outcomes: Meal time behavior, Weight     F/U: 10/31/2021    Discharge Planning:    Continue current diet     Electronically signed by Escobar Freeman on 10/26/2021 at 2:57 PM    Contact: SUZAN [phone]

## 2021-10-26 NOTE — PROGRESS NOTES
Received pt awake and alert. Lung sounds diminished. Bowel sounds positive. PP positive. Pt has O2 3lpm NC; no respiratory distress noted. Pt has dressing to right elbow; CDI. Pt denies pain or discomfort.

## 2021-10-26 NOTE — PROGRESS NOTES
Hospitalist Progress Note     INTERNAL MEDICINE PROGRESS NOTE  Patient: Arlen Gutierrez   YOB: 1934   MRN: [id number]      Hospital course / Assessment    Elevated troponin  -Initially 0.36 without any chest pain symptoms, EKG showed no signs of ischemia, Lytes Normal  -Likely related to A.  Fib, CHF   -Cardiology consulted  -Echocardiogram pending  Paroxysmal atrial fibrillation ([facility]  -Rate controlled on Coreg , Eliquis   -Pacemaker intact  -Continue on telemetry  HFrEF (heart failure with reduced ejection fraction) ([facility] nonischemic cardiomyopathy  -CT and chest x-ray signs of some effusion  -Continue to diuresis with Lasix, Coreg  And Daily weights  -Cardiology consultation  Generalized aching pain   - back and arms, cont' with tylenol   Essential hypertension  -Continue with carvedilol  -Normotensive on admission, Continue to monitor  Lung mass  -Chronic/known problem  -No intervention being taken at this time  Chronic obstructive lung disease ([facility]  -Maintain oxygen saturation above 92%, NC 3L by NC  -Continue with home inhalers of Brovana and albuterol  Hyperlipidemia   - Continue with Statin   GERD  - Continue with Pepcid     Code Status: DNR/DNI   DVT prophylaxis: pharmacologic and mechanical  Today Recommendation and Plan:   Cardiology recommendation   PT/ Ambulation   Pain control     Disposition    Disposition: Home     Subjective / ROS:   Patient states her pain is 4/10 today, still feel weak and having dizziness       Medical Decision Making   Chart, Images and Lab data reviewed, necessary medical Orders placed   Discussed with nursing staff     Vitals:    10/26/21 0348 10/26/21 0444 10/26/21 0820 10/26/21 0848   BP:  117/70  114/63   Pulse:  71  68   Resp:  18  19   Temp:  96.9 °F (36.1 °C)  97.2 °F (36.2 °C)   SpO2:  95% 99% 98%   Weight: 54.5 kg (120 lb 3.2 oz)      Height:         Temp (24hrs), Av °F (36.1 °C), Min:96.9 °F (36.1 °C), Max:97.2 °F (36.2 °C)      Intake/Output Summary (Last 24 hours) at 10/26/2021 0957  Last data filed at 10/26/2021 0503  Gross per 24 hour   Intake    Output 200 ml   Net -200 ml       Physical Exam:   General Appearance:   Appears in no acute distress.,  HEENT:   Moist oral mucous membranes, conjunctiva clear,   Neck:   Supple  Lungs: No wheezes. , No rales. , Normal respiratory effort,   Heart:   Regular rate and rhythm  Abdomen:   Soft , Non-distended and Non-tender,   Extremities:   - edema of legs  Neuro:   alert, oriented, moves all extremities well    Current medications:     Current Facility-Administered Medications   Medication Dose Route Frequency    albuterol (PROVENTIL HFA, VENTOLIN HFA, PROAIR HFA) inhaler 2 Puff  2 Puff Inhalation Q6H PRN    apixaban (ELIQUIS) tablet 2.5 mg  2.5 mg Oral BID    calcium-vitamin D (OS-MOOK +D3) 250 mg-125 unit per tablet 1 Tablet  1 Tablet Oral DAILY WITH BREAKFAST    carvediloL (COREG) tablet 3.125 mg  3.125 mg Oral BID WITH MEALS    cetirizine (ZYRTEC) tablet 5 mg  5 mg Oral DAILY    cyanocobalamin (VITAMIN B12) tablet 500 mcg  500 mcg Oral DAILY    dicyclomine (BENTYL) capsule 10 mg  10 mg Oral Q6H PRN    famotidine (PEPCID) tablet 20 mg  20 mg Oral BID    linaCLOtide (LINZESS) caspule 72 mcg- PT MUST SUPPLY (Patient Supplied)  72 mcg Oral DAILY    methocarbamoL (ROBAXIN) tablet 500 mg  500 mg Oral QID    therapeutic multivitamin (THERAGRAN) tablet 1 Tablet  1 Tablet Oral DAILY    atorvastatin (LIPITOR) tablet 10 mg  10 mg Oral QHS    tamsulosin (FLOMAX) capsule 0.4 mg  0.4 mg Oral DAILY    sodium chloride (NS) flush 5-40 mL  5-40 mL IntraVENous Q8H    sodium chloride (NS) flush 5-40 mL  5-40 mL IntraVENous PRN    acetaminophen (TYLENOL) tablet 650 mg  650 mg Oral Q6H PRN    Or    acetaminophen (TYLENOL) suppository 650 mg  650 mg Rectal Q6H PRN    polyethylene glycol (MIRALAX) packet 17 g  17 g Oral DAILY PRN    promethazine (PHENERGAN) tablet 12.5 mg  12.5 mg Oral Q6H PRN    Or    ondansetron (ZOFRAN) injection 4 mg  4 mg IntraVENous Q6H PRN    influenza vaccine 2021-22 (6 mos+)(PF) (FLUARIX/FLULAVAL/FLUZONE QUAD) injection 0.5 mL  1 Each IntraMUSCular PRIOR TO DISCHARGE    budesonide-formoterol (SYMBICORT) 80-4.5 mcg inhaler  2 Puff Inhalation BID RT    traMADoL (ULTRAM) tablet 50 mg  50 mg Oral Q6H PRN          Laboratory and Radiology Data :      No results found for: FERR  WBC   Date Value Ref Range Status   10/26/2021 8.7 4.6 - 13.2 K/uL Final     No results found for: REUBEN  No results found for: UEO    Microbiology    No results found for: GMS    Recent Results (from the past 24 hour(s))   TROPONIN I    Collection Time: 10/25/21 12:30 PM   Result Value Ref Range    Troponin-I, Qt. 0.67 (H) 0.02 - 0.05 ng/mL   ECHO ADULT COMPLETE    Collection Time: 10/25/21  6:52 PM   Result Value Ref Range    Left Atrium Major Axis 5.44 cm    LA Area 2C 17.50 cm2    LA Area 4C 15.40 cm2    LA Volume DISK BP 35.20 22.0 - 52.0 cm3    Left Atrium Major Axis 4.00 cm    Left Atrium to Aortic Root Ratio 1.33     Right Atrial Area 4C 15.30 cm2    Right Atrium Major Axis 5.13 cm    Est. RA Pressure 8.00 mmHg    Right Atrium Minor Axis 4.10 cm    Aortic Regurgitant Pressure Half-time 692.00 ms    AR Max Mehrdad 351.00 cm/s    Aortic Valve Systolic Peak Velocity 060.41 cm/s    AoV PG 6.00 mmHg    AV Cusp 1.80 cm    Aortic Valve Systolic Mean Gradient 3.60 mmHg    AoV VTI 19.20 cm    Aortic valve mean velocity 84.70 cm/s    Aortic Valve Area by Continuity of VTI 3.46 cm2    Aortic Valve Area by Continuity of Peak Velocity 3.26 cm2    GIOVANNI/BSA 2.19     Ao Root D 3.00 cm    AO ASC D 4.00 cm    IVSd 0.90 0.60 - 0.90 cm    LVIDd 4.10 3.90 - 5.30 cm    LVIDs 2.80 cm    LVOT d 2.10 cm    Left Ventricular Outflow Tract Mean Gradient 3.00 mmHg    LVOT VTI 19.20 cm    LVOT VTI 19.20 cm    LVOT Peak Velocity 114.00 cm/s    LVOT Peak Gradient 5.00 mmHg    LVPWd 1.00 (A) 0.60 - 0.90 cm    LV ED Vol A2C 68.90 cm3    LV ES Vol A2C 22.00 cm3    LVOT SV 66.0 cm3    RVIDd 3.10 cm    RVSP 37.00 mmHg    Tapse 1.63 1.50 - 2.00 cm    TR Max Velocity 271.00 cm/s    TV MG 29.00 mmHg    BP EF 60.1 55.0 - 100.0 %    LV Mass .0 67.0 - 162.0 g    LV Mass AL Index 77.8 43.0 - 95.0 g/m2    GIOVANNI/BSA Pk Mehrdad 2.1 cm2/m2    GIOVANNI/BSA VTI 2.2 JW5/C9   METABOLIC PANEL, BASIC    Collection Time: 10/26/21  4:20 AM   Result Value Ref Range    Sodium 137 135 - 145 mmol/L    Potassium 3.7 3.2 - 5.1 mmol/L    Chloride 101 94 - 111 mmol/L    CO2 28 21 - 33 mmol/L    Anion gap 8 mmol/L    Glucose 127 (H) 70 - 110 mg/dL    BUN 19 9 - 21 mg/dL    Creatinine 0.50 (L) 0.70 - 1.20 mg/dL    BUN/Creatinine ratio 38      GFR est AA >60 ml/min/1.73m2    GFR est non-AA >60 ml/min/1.73m2    Calcium 8.2 (L) 8.5 - 10.5 mg/dL   CBC WITH AUTOMATED DIFF    Collection Time: 10/26/21  4:20 AM   Result Value Ref Range    WBC 8.7 4.6 - 13.2 K/uL    RBC 3.71 (L) 4.20 - 5.30 M/uL    HGB 11.4 (L) 12.0 - 16.0 g/dL    HCT 36.1 35.0 - 45.0 %    MCV 97.3 78.0 - 100.0 FL    MCH 30.7 24.0 - 34.0 PG    MCHC 31.6 31.0 - 37.0 g/dL    RDW 15.0 (H) 11.6 - 14.5 %    PLATELET 281 451 - 017 K/uL    MPV 10.7 9.2 - 11.8 FL    NRBC 0.0 0.0  WBC    ABSOLUTE NRBC 0.00 0.00 - 0.01 K/uL    NEUTROPHILS 80 (H) 40 - 73 %    LYMPHOCYTES 12 (L) 21 - 52 %    MONOCYTES 8 3 - 10 %    EOSINOPHILS 0 0 - 5 %    BASOPHILS 0 0 - 2 %    IMMATURE GRANULOCYTES 0 0 - 0.5 %    ABS. NEUTROPHILS 6.9 1.8 - 8.0 K/UL    ABS. LYMPHOCYTES 1.0 0.9 - 3.6 K/UL    ABS. MONOCYTES 0.7 0.05 - 1.2 K/UL    ABS. EOSINOPHILS 0.0 0.0 - 0.4 K/UL    ABS. BASOPHILS 0.0 0.0 - 0.1 K/UL    ABS. IMM.  GRANS. 0.0 0.00 - 0.04 K/UL    DF AUTOMATED         XR Results:  Results from Hospital Encounter encounter on 10/25/21    XR CHEST PORT    Narrative  EXAM: XR CHEST PORT    CLINICAL INDICATION/HISTORY: shortness of breath  -Additional: None    COMPARISON: Radiographs 10/1/2021    TECHNIQUE: Frontal view of the chest    _______________    FINDINGS:    HEART AND MEDIASTINUM: Stable appearing cardiac size and mediastinal contours  with biventricular pacer defibrillator redemonstrated. LUNGS AND PLEURAL SPACES: Small bilateral effusions. Right lower lung zone  opacity without evidence of pneumothorax present. Left lung clear as visualized. BONY THORAX AND SOFT TISSUES: No acute osseous abnormality    _______________    Impression  1. Small bilateral effusions with asymmetric airspace disease/atelectasis right  lower lobe. CT Results:  Results from Hospital Encounter encounter on 10/25/21    CT ABD PELV WO CONT    Narrative  EXAM: CT of the abdomen and pelvis    INDICATION: Pain. COMPARISON: September 30, 2021. TECHNIQUE: Axial CT imaging of the abdomen and pelvis was performed without  intravenous contrast. Multiplanar reformats were generated. Dose reduction  techniques used: automated exposure control, adjustment of the mAs and/or kVp  according to patient size, and iterative reconstruction techniques. Digital  imaging and communications in Medicine (DICOM) format image data are available  to nonaffiliated external healthcare facilities or entities on a secure, media  free, reciprocally searchable basis with patient authorization for at least 12  months after this study. _______________    FINDINGS:    LOWER CHEST: There is a partially imaged small to moderate right pleural  effusion associated with right lung base consolidation. There is a small left  pleural effusion with mild left lung base consolidation. The heart is enlarged. There is a minimal pericardial effusion. LIVER, BILIARY: Liver is normal. No biliary dilation. Gallbladder is  unremarkable. PANCREAS: Normal.    SPLEEN: Normal.    ADRENALS: Normal.    KIDNEYS: There is a stable cyst mid to lower pole of the left kidney. There is  no hydronephrosis. LYMPH NODES: No enlarged lymph nodes.     GASTROINTESTINAL TRACT: There is diverticulosis of the transverse, descending  and sigmoid colon without definitive evidence for diverticulitis. There is  retained stool. PELVIC ORGANS: Unremarkable. VASCULATURE: Unremarkable. BONES: There is mild loss of height of the T12 and L5 vertebral bodies likely  chronic. There is curvature of the thoracic spine to the right. OTHER: There is a small amount of presacral fluid. _______________    Impression  Small to moderate right pleural effusion with right lung base consolidation  suggesting atelectasis and/or infiltrate. Small left pleural effusion with left lung base atelectasis. Small pericardial effusion. Transverse, descending and sigmoid colon diverticulosis without evidence for  diverticulitis. Small amount of presacral fluid noted. MRI Results:  No results found for this or any previous visit. Nuclear Medicine Results:  No results found for this or any previous visit. US Results:  No results found for this or any previous visit. IR Results:  No results found for this or any previous visit. VAS/US Results:  No results found for this or any previous visit. Tad Graham M.D.   Hospitalist

## 2021-10-26 NOTE — PROGRESS NOTES
Problem: Pressure Injury - Risk of  Goal: *Prevention of pressure injury  Description: Document Jeffrey Scale and appropriate interventions in the flowsheet. Outcome: Progressing Towards Goal  Note: Pressure Injury Interventions:  Sensory Interventions: Assess changes in LOC    Moisture Interventions: Absorbent underpads    Activity Interventions: Assess need for specialty bed    Mobility Interventions: HOB 30 degrees or less    Nutrition Interventions: Document food/fluid/supplement intake, Offer support with meals,snacks and hydration    Friction and Shear Interventions: Apply protective barrier, creams and emollients                Problem: Patient Education: Go to Patient Education Activity  Goal: Patient/Family Education  Outcome: Progressing Towards Goal     Problem: Falls - Risk of  Goal: *Absence of Falls  Description: Document William Fall Risk and appropriate interventions in the flowsheet.   Outcome: Progressing Towards Goal  Note: Fall Risk Interventions:            Medication Interventions: Patient to call before getting OOB         History of Falls Interventions: Bed/chair exit alarm         Problem: Patient Education: Go to Patient Education Activity  Goal: Patient/Family Education  Outcome: Progressing Towards Goal     Problem: Patient Education: Go to Patient Education Activity  Goal: Patient/Family Education  Outcome: Progressing Towards Goal

## 2021-10-26 NOTE — PROGRESS NOTES
Problem: Mobility Impaired (Adult and Pediatric)  Goal: *Acute Goals and Plan of Care (Insert Text)  Description: Physical Therapy Goals  Initiated 10/25/2021 and to be accomplished within 7 day(s)  1. Patient will move from supine to sit and sit to supine , scoot up and down, and roll side to side in bed with supervision/set-up. 2.  Patient will transfer from bed to chair and chair to bed with minimal assistance/contact guard assist using the least restrictive device. 3.  Patient will perform sit to stand with minimal assistance/contact guard assist.  4.  Patient will ambulate with minimal assistance/contact guard assist for 25 feet with the least restrictive device. 5.  Patient will ascend/descend 4 stairs with 2 handrail(s) with minimal assistance/contact guard assist.    PLOF: Community ambulator who used a RW or cane and who was (I) with ADLs. Recently pt has been a household ambulator who used a RW with assist for ADLs from family. Outcome: Progressing Towards Goal   PHYSICAL THERAPY TREATMENT    Patient: Laura Cano (73 y.o. female)  Date: 10/26/2021  Diagnosis: HFrEF (heart failure with reduced ejection fraction) ([facility] [I50.20]  Elevated troponin [R77.8] Elevated troponin       Precautions: Fall, Other (comment) (Monitor SpO2)    ASSESSMENT:  Pt agreeable to P.T. states she had an accident and needs to be changed. Nursing in to assist with clean up. Pt rolls well side to side but requires total assist for clean up. She then performs bed mobility and comes to stand and is able to stand for a couple of minutes. BP was low upon sitting up but it did not drop with standing and was 100/52. During standing she has another spell of bowel incontinence and returns to bed for clean up. Therapist provides total assist for clean up but pt rolls with just min assist. She was left with all needs met.   Progression toward goals:   []      Improving appropriately and progressing toward goals  [x] Improving slowly and progressing toward goals  []      Not making progress toward goals and plan of care will be adjusted     PLAN:  Patient continues to benefit from skilled intervention to address the above impairments. Continue treatment per established plan of care. Discharge Recommendations:  Home Health, Home Physical Therapy, or Hospice  Further Equipment Recommendations for Discharge:  N/A     SUBJECTIVE:   Patient stated I need to get up and do something.     OBJECTIVE DATA SUMMARY:   Critical Behavior:  Neurologic State: Alert  Orientation Level: Oriented X4  Functional Mobility Training:  Bed Mobility:  Rolling: Minimum assistance  Supine to Sit: Minimum assistance  Sit to Supine: Modified independent  Scooting: Contact guard assistance  Transfers:  Sit to Stand: Minimum assistance (2 attempts to get to stand)  Stand to Sit: Minimum assistance  Balance:  Sitting: Intact  Standing: Impaired  Standing - Static: Fair  Standing - Dynamic : Poor  Pain:  Pain level pre-treatment: 0/10  Pain level post-treatment: 0/10   Pain Location: N/A  Activity Tolerance:   Poor  Please refer to the flowsheet for vital signs taken during this treatment. After treatment:   [] Patient left in no apparent distress sitting up in chair  [x] Patient left in no apparent distress in bed  [x] Call bell left within reach  [x] Nursing notified  [] Caregiver present  [] Bed alarm activated  [] SCDs applied      COMMUNICATION/EDUCATION:   [x]         Role of Physical Therapy in the acute care setting. [x]         Fall prevention education was provided and the patient/caregiver indicated understanding. [x]         Patient/family have participated as able in working toward goals and plan of care. [x]         Patient/family agree to work toward stated goals and plan of care. []         Patient understands intent and goals of therapy, but is neutral about his/her participation.   []         Patient is unable to participate in stated goals/plan of care: ongoing with therapy staff.  []         Other:        Elaine Duong, PT, DPT   Time Calculation: 31 mins

## 2021-10-26 NOTE — PROGRESS NOTES
CARDIOLOGY PROGRESS NOTE - NP    Patient seen and examined. This is a patient who is followed for elevated troponin, acute heart failure, and atrial fibrillation. She is free of chest pain or shortness of breath. I&Os have not been accurately measured/documented. No other complaints reported. COVID PCR is pending. Telemetry reviewed, there were no events noted in the past 24 hours. She is in atrial fibrillation with rate in the 50s-60s. Pertinent review of systems items noted above, all other systems are negative. Current medications reviewed. PHYSICAL EXAMINATION:  Vital sign assessment reveal a blood pressure of 114/63 and pulse rate of 68. Cardiovascular exam has a heart with a regular rate and rhythm on telemetry. Respiratory exam reveals 3L oxygen use. Neurologic exam is nonfocal.  Further exam was deferred due to COVID-19 crisis. Recent labs results and imaging reviewed.   Notable findings include:   Recent Results (from the past 24 hour(s))   ECHO ADULT COMPLETE    Collection Time: 10/25/21  6:52 PM   Result Value Ref Range    Left Atrium Major Axis 5.44 cm    LA Area 2C 17.50 cm2    LA Area 4C 15.40 cm2    LA Volume DISK BP 35.20 22.0 - 52.0 cm3    Left Atrium Major Axis 4.00 cm    Left Atrium to Aortic Root Ratio 1.33     Right Atrial Area 4C 15.30 cm2    Right Atrium Major Axis 5.13 cm    Est. RA Pressure 8.00 mmHg    Right Atrium Minor Axis 4.10 cm    Aortic Regurgitant Pressure Half-time 692.00 ms    AR Max Mehrdad 351.00 cm/s    Aortic Valve Systolic Peak Velocity 035.02 cm/s    AoV PG 6.00 mmHg    AV Cusp 1.80 cm    Aortic Valve Systolic Mean Gradient 6.73 mmHg    AoV VTI 19.20 cm    Aortic valve mean velocity 84.70 cm/s    Aortic Valve Area by Continuity of VTI 3.46 cm2    Aortic Valve Area by Continuity of Peak Velocity 3.26 cm2    GIOVANNI/BSA 2.19     Ao Root D 3.00 cm    AO ASC D 4.00 cm    IVSd 0.90 0.60 - 0.90 cm    LVIDd 4.10 3.90 - 5.30 cm    LVIDs 2.80 cm    LVOT d 2.10 cm    Left Ventricular Outflow Tract Mean Gradient 3.00 mmHg    LVOT VTI 19.20 cm    LVOT VTI 19.20 cm    LVOT Peak Velocity 114.00 cm/s    LVOT Peak Gradient 5.00 mmHg    LVPWd 1.00 (A) 0.60 - 0.90 cm    LV ED Vol A2C 68.90 cm3    LV ES Vol A2C 22.00 cm3    LVOT SV 66.0 cm3    RVIDd 3.10 cm    RVSP 37.00 mmHg    Tapse 1.63 1.50 - 2.00 cm    TR Max Velocity 271.00 cm/s    TV MG 29.00 mmHg    BP EF 60.1 55.0 - 100.0 %    LV Mass .0 67.0 - 162.0 g    LV Mass AL Index 77.8 43.0 - 95.0 g/m2    GIOVANNI/BSA Pk Mehrdad 2.1 cm2/m2    GIOVANNI/BSA VTI 2.2 HT5/S3   METABOLIC PANEL, BASIC    Collection Time: 10/26/21  4:20 AM   Result Value Ref Range    Sodium 137 135 - 145 mmol/L    Potassium 3.7 3.2 - 5.1 mmol/L    Chloride 101 94 - 111 mmol/L    CO2 28 21 - 33 mmol/L    Anion gap 8 mmol/L    Glucose 127 (H) 70 - 110 mg/dL    BUN 19 9 - 21 mg/dL    Creatinine 0.50 (L) 0.70 - 1.20 mg/dL    BUN/Creatinine ratio 38      GFR est AA >60 ml/min/1.73m2    GFR est non-AA >60 ml/min/1.73m2    Calcium 8.2 (L) 8.5 - 10.5 mg/dL   CBC WITH AUTOMATED DIFF    Collection Time: 10/26/21  4:20 AM   Result Value Ref Range    WBC 8.7 4.6 - 13.2 K/uL    RBC 3.71 (L) 4.20 - 5.30 M/uL    HGB 11.4 (L) 12.0 - 16.0 g/dL    HCT 36.1 35.0 - 45.0 %    MCV 97.3 78.0 - 100.0 FL    MCH 30.7 24.0 - 34.0 PG    MCHC 31.6 31.0 - 37.0 g/dL    RDW 15.0 (H) 11.6 - 14.5 %    PLATELET 133 340 - 308 K/uL    MPV 10.7 9.2 - 11.8 FL    NRBC 0.0 0.0  WBC    ABSOLUTE NRBC 0.00 0.00 - 0.01 K/uL    NEUTROPHILS 80 (H) 40 - 73 %    LYMPHOCYTES 12 (L) 21 - 52 %    MONOCYTES 8 3 - 10 %    EOSINOPHILS 0 0 - 5 %    BASOPHILS 0 0 - 2 %    IMMATURE GRANULOCYTES 0 0 - 0.5 %    ABS. NEUTROPHILS 6.9 1.8 - 8.0 K/UL    ABS. LYMPHOCYTES 1.0 0.9 - 3.6 K/UL    ABS. MONOCYTES 0.7 0.05 - 1.2 K/UL    ABS. EOSINOPHILS 0.0 0.0 - 0.4 K/UL    ABS. BASOPHILS 0.0 0.0 - 0.1 K/UL    ABS. IMM.  GRANS. 0.0 0.00 - 0.04 K/UL    DF AUTOMATED     TROPONIN I    Collection Time: 10/26/21  4:20 AM   Result Value Ref Range    Troponin-I, Qt. 0.24 (H) 0.02 - 0.05 ng/mL     Discussed case with Dr. Jony Stewart, and our impression and recommendations are as follows:  1. Elevated troponins: Troponins are elevated as follows: 0.36-->0.65-->0.67-->0.24 currently. EKG is nonischemic. Troponin elevation is likely due to right lung mass and acute heart failure in the setting of nonischemic cardiomyopathy. Will defer aspirin therapy for now due to Eliquis use. Will continue beta blocker. Echocardiogram obtained this admission shows preserved EF and no wall motion abnormalities. The patient will need outpatient ischemic evaluation. 2. Acute heart failure with preserved EF: Bilateral pleural effusions on abdomen/pelvis CT. . She has been diuresed with Lasix 40 mg IV; will transition to 40 mg po once daily. Echocardiogram with preserved EF of 60% and no wall motion abnormalities. Continue telemetry monitoring. Keep serum potassium between 4-5 and serum magnesium > 2.  3. Paroxysmal atrial fibrillation: Rate is controlled. Continue carvedilol for rate control. Continue Eliquis for anticoagulation. Continue telemetry monitoring. Keep serum potassium between 4-5 and serum magnesium > 2. Thank you for involving us in the care of this patient. Please do not hesitate to call me or Dr. Jony tSewart if additional questions arise. If assistance is needed after hours or on weekends, please call the answering service at [phone].

## 2021-10-26 NOTE — PROGRESS NOTES
1930  Received care of pt lying in bed with eyes closed. Pt awakens easily when name called. Routine assessment and vs completed. Pt is complaint free at this time. Call bell in reach and bed in lowest position. 2200  Pt accepted hs meds without difficulty. No complaints verbalized.

## 2021-10-27 VITALS
HEART RATE: 53 BPM | TEMPERATURE: 98.1 F | BODY MASS INDEX: 21 KG/M2 | HEIGHT: 63 IN | OXYGEN SATURATION: 97 % | RESPIRATION RATE: 16 BRPM | SYSTOLIC BLOOD PRESSURE: 114 MMHG | WEIGHT: 118.5 LBS | DIASTOLIC BLOOD PRESSURE: 50 MMHG

## 2021-10-27 LAB
ANION GAP SERPL CALC-SCNC: 9 MMOL/L
BASOPHILS # BLD: 0 K/UL (ref 0–0.1)
BASOPHILS NFR BLD: 0 % (ref 0–2)
BUN SERPL-MCNC: 18 MG/DL (ref 9–21)
BUN/CREAT SERPL: 36
CA-I BLD-MCNC: 8 MG/DL (ref 8.5–10.5)
CHLORIDE SERPL-SCNC: 105 MMOL/L (ref 94–111)
CO2 SERPL-SCNC: 28 MMOL/L (ref 21–33)
CREAT SERPL-MCNC: 0.5 MG/DL (ref 0.7–1.2)
DIFFERENTIAL METHOD BLD: ABNORMAL
EOSINOPHIL # BLD: 0.1 K/UL (ref 0–0.4)
EOSINOPHIL NFR BLD: 1 % (ref 0–5)
ERYTHROCYTE [DISTWIDTH] IN BLOOD BY AUTOMATED COUNT: 14.9 % (ref 11.6–14.5)
GLUCOSE SERPL-MCNC: 85 MG/DL (ref 70–110)
HCT VFR BLD AUTO: 38.1 % (ref 35–45)
HGB BLD-MCNC: 11.7 G/DL (ref 12–16)
IMM GRANULOCYTES # BLD AUTO: 0 K/UL (ref 0–0.04)
IMM GRANULOCYTES NFR BLD AUTO: 0 % (ref 0–0.5)
LYMPHOCYTES # BLD: 2.3 K/UL (ref 0.9–3.6)
LYMPHOCYTES NFR BLD: 23 % (ref 21–52)
MCH RBC QN AUTO: 29.9 PG (ref 24–34)
MCHC RBC AUTO-ENTMCNC: 30.7 G/DL (ref 31–37)
MCV RBC AUTO: 97.4 FL (ref 78–100)
MONOCYTES # BLD: 0.8 K/UL (ref 0.05–1.2)
MONOCYTES NFR BLD: 8 % (ref 3–10)
NEUTS SEG # BLD: 6.8 K/UL (ref 1.8–8)
NEUTS SEG NFR BLD: 68 % (ref 40–73)
NRBC # BLD: 0 K/UL (ref 0–0.01)
NRBC BLD-RTO: 0 PER 100 WBC
PLATELET # BLD AUTO: 378 K/UL (ref 135–420)
PMV BLD AUTO: 10.4 FL (ref 9.2–11.8)
POTASSIUM SERPL-SCNC: 3 MMOL/L (ref 3.2–5.1)
RBC # BLD AUTO: 3.91 M/UL (ref 4.2–5.3)
SARS-COV-2, COV2NT: NOT DETECTED
SODIUM SERPL-SCNC: 142 MMOL/L (ref 135–145)
WBC # BLD AUTO: 10 K/UL (ref 4.6–13.2)

## 2021-10-27 PROCEDURE — 36415 COLL VENOUS BLD VENIPUNCTURE: CPT

## 2021-10-27 PROCEDURE — 74011250637 HC RX REV CODE- 250/637: Performed by: INTERNAL MEDICINE

## 2021-10-27 PROCEDURE — 80048 BASIC METABOLIC PNL TOTAL CA: CPT

## 2021-10-27 PROCEDURE — 94640 AIRWAY INHALATION TREATMENT: CPT

## 2021-10-27 PROCEDURE — 90686 IIV4 VACC NO PRSV 0.5 ML IM: CPT | Performed by: INTERNAL MEDICINE

## 2021-10-27 PROCEDURE — 77010033678 HC OXYGEN DAILY

## 2021-10-27 PROCEDURE — 94761 N-INVAS EAR/PLS OXIMETRY MLT: CPT

## 2021-10-27 PROCEDURE — 74011250636 HC RX REV CODE- 250/636: Performed by: INTERNAL MEDICINE

## 2021-10-27 PROCEDURE — 97530 THERAPEUTIC ACTIVITIES: CPT

## 2021-10-27 PROCEDURE — 74011250637 HC RX REV CODE- 250/637: Performed by: PHYSICIAN ASSISTANT

## 2021-10-27 PROCEDURE — 85025 COMPLETE CBC W/AUTO DIFF WBC: CPT

## 2021-10-27 PROCEDURE — 90471 IMMUNIZATION ADMIN: CPT

## 2021-10-27 RX ORDER — POTASSIUM CHLORIDE 750 MG/1
40 TABLET, EXTENDED RELEASE ORAL ONCE
Status: DISCONTINUED | OUTPATIENT
Start: 2021-10-27 | End: 2021-10-27

## 2021-10-27 RX ORDER — POTASSIUM CHLORIDE 1500 MG/1
20 TABLET, FILM COATED, EXTENDED RELEASE ORAL DAILY
Qty: 30 TABLET | Refills: 0 | Status: SHIPPED | OUTPATIENT
Start: 2021-10-27

## 2021-10-27 RX ORDER — FUROSEMIDE 40 MG/1
40 TABLET ORAL DAILY
Qty: 30 TABLET | Refills: 0 | Status: SHIPPED | OUTPATIENT
Start: 2021-10-27

## 2021-10-27 RX ORDER — POTASSIUM CHLORIDE 750 MG/1
40 TABLET, EXTENDED RELEASE ORAL
Status: COMPLETED | OUTPATIENT
Start: 2021-10-27 | End: 2021-10-27

## 2021-10-27 RX ADMIN — TAMSULOSIN HYDROCHLORIDE 0.4 MG: 0.4 CAPSULE ORAL at 09:07

## 2021-10-27 RX ADMIN — INFLUENZA VIRUS VACCINE 0.5 ML: 15; 15; 15; 15 SUSPENSION INTRAMUSCULAR at 14:25

## 2021-10-27 RX ADMIN — BUDESONIDE AND FORMOTEROL FUMARATE DIHYDRATE 2 PUFF: 80; 4.5 AEROSOL RESPIRATORY (INHALATION) at 09:12

## 2021-10-27 RX ADMIN — FAMOTIDINE 20 MG: 20 TABLET ORAL at 09:07

## 2021-10-27 RX ADMIN — METHOCARBAMOL 500 MG: 500 TABLET ORAL at 09:07

## 2021-10-27 RX ADMIN — THERA TABS 1 TABLET: TAB at 09:07

## 2021-10-27 RX ADMIN — Medication 10 ML: at 05:19

## 2021-10-27 RX ADMIN — APIXABAN 2.5 MG: 2.5 TABLET, FILM COATED ORAL at 09:07

## 2021-10-27 RX ADMIN — METHOCARBAMOL 500 MG: 500 TABLET ORAL at 14:26

## 2021-10-27 RX ADMIN — CETIRIZINE HYDROCHLORIDE 5 MG: 10 TABLET, FILM COATED ORAL at 09:07

## 2021-10-27 RX ADMIN — Medication 1 TABLET: at 09:07

## 2021-10-27 RX ADMIN — Medication 10 ML: at 14:26

## 2021-10-27 RX ADMIN — POTASSIUM CHLORIDE 40 MEQ: 10 TABLET, EXTENDED RELEASE ORAL at 09:07

## 2021-10-27 RX ADMIN — CYANOCOBALAMIN TAB 500 MCG 500 MCG: 500 TAB at 09:07

## 2021-10-27 NOTE — PROGRESS NOTES
0772 assumed care of patient lying in bed. No acute distress note.d VSS. Assisted patient with breakfast. Patient denies any needs Cb in reach     0910 patient tolerated am medications well.  Patient provided warm blankets ar requested and denies any other need CB in reach

## 2021-10-27 NOTE — DISCHARGE SUMMARY
Hospitalist Discharge Summary     Patient ID:    Laura Cano  [id number]  6 y.o.  1934    Admit date: 10/25/2021    Discharge date : 10/27/2021    Chronic Diagnoses:    Problem List as of 10/27/2021 Date Reviewed: 10/25/2021        Codes Class Noted - Resolved    * (Principal) Elevated troponin ICD-10-CM: R77.8  ICD-9-CM: 790.6  10/25/2021 - Present        HFrEF (heart failure with reduced ejection fraction) ([facility] ICD-10-CM: I50.20  ICD-9-CM: 428.20  10/25/2021 - Present        COPD (chronic obstructive pulmonary disease) ([facility] ICD-10-CM: J44.9  ICD-9-CM: 496  10/1/2021 - Present        Diverticulitis ICD-10-CM: K57.92  ICD-9-CM: 562.11  10/1/2021 - Present        Dehydration ICD-10-CM: E86.0  ICD-9-CM: 276.51  10/1/2021 - Present        Physical deconditioning ICD-10-CM: R53.81  ICD-9-CM: 799.3  10/1/2021 - Present        Clostridium difficile colitis ICD-10-CM: A04.72  ICD-9-CM: 008.45  10/1/2021 - Present        Colitis ICD-10-CM: K52.9  ICD-9-CM: 558.9  9/6/2021 - Present        Urinary retention ICD-10-CM: R33.9  ICD-9-CM: 788.20  9/6/2021 - Present        Paroxysmal atrial fibrillation ([facility] ICD-10-CM: I48.0  ICD-9-CM: 427.31  9/6/2021 - Present        Abdominal pain ICD-10-CM: R10.9  ICD-9-CM: 789.00  9/6/2021 - Present        Lung mass ICD-10-CM: R91.8  ICD-9-CM: 786.6  9/6/2021 - Present        Hypokalemia ICD-10-CM: E87.6  ICD-9-CM: 276.8  9/6/2021 - Present        Allergic rhinitis ICD-10-CM: J30.9  ICD-9-CM: 477.9  Unknown - Present    Overview Signed 9/1/2020 11:55 AM by Emanuel Stewart     04- visitStable. No flare-ups. Last modified by Mayra Wheeler  10-, 09:13             Chronic obstructive lung disease ([facility] ICD-10-CM: J44.9  ICD-9-CM: 816  Unknown - Present    Overview Signed 9/1/2020 12:00 PM by Emanuel Stewart     11- visitsees pulmonology (Dr. Stephen Rodgers) for for COPD.  no signs of exacerbation.     REQUESTING ORFDER FOR CXR ( MISSED THE ORDER DONE BY DR Lexy Arora FOR FU LEONARDO)  Last modified by [email or web address]  10-, 09:13             Congestive heart failure (CHF) ([facility] ICD-10-CM: I50.9  ICD-9-CM: 428.0  Unknown - Present    Overview Signed 9/1/2020 12:01 PM by Jamal Tay     With acid    08- visitSees cardiology. No issues. Last modified by Amaris Mesa [facility]  10-, 09:13             Essential hypertension ICD-10-CM: I10  ICD-9-CM: 401.9  Unknown - Present    Overview Signed 9/1/2020 12:03 PM by Jamal Tay     04- visitBP stable. continue meds  -Discussed checking BP at home occasionally and recording for f/u visit.  -Patient is aware to call our office if BP is greater than 150/90 mmHg or less than 100/60 mmHg.  -Discussed low salt dieting and exercising 150 minutes/week.   Last modified by Amaris Mesa [facility]  10-, 09:13             Gastroesophageal reflux disease ICD-10-CM: K21.9  ICD-9-CM: 530.81  Unknown - Present    Overview Signed 9/1/2020 12:03 PM by Jamal Tay     10- visitnot controlled  change Zantac to Pepcid  Last modified by Amaris Mesa [facility]  10-, 09:13             Hyperlipidemia ICD-10-CM: E78.5  ICD-9-CM: 272.4  Unknown - Present        Myocardial infarction ([facility] ICD-10-CM: I21.9  ICD-9-CM: 410.90  Unknown - Present        Osteopenia ICD-10-CM: M85.80  ICD-9-CM: 733.90  Unknown - Present    Overview Signed 9/1/2020 12:05 PM by Jamal Tay     Takes calcium and vitamin D             Chronic constipation ICD-10-CM: K59.09  ICD-9-CM: 564.00  10/22/2018 - Present        Diverticular disease ICD-10-CM: K57.90  ICD-9-CM: 562.10  9/4/2018 - Present        Pulmonary hypertension ([facility] ICD-10-CM: I27.20  ICD-9-CM: 416.8  9/4/2018 - Present        Coronary arteriosclerosis ICD-10-CM: I25.10  ICD-9-CM: 414.00  8/30/2018 - Present        Cardiac pacemaker in situ ICD-10-CM: Z95.0  ICD-9-CM: V45.01  1/1/2007 - Present    Overview Signed 9/1/2020 11:55 AM by Vinnie Woods, Nandini Letty     04- visitSays she just had a new pacemaker put in back in August 2018. Sees cardiology routinely. Entered by Saniya Duron  08-, 09:49               22    Final Diagnoses:   Principal Problem:    Elevated troponin (10/25/2021)    Active Problems:    Chronic obstructive lung disease ([facility] ()      Overview: 11- visitsees pulmonology (Dr. Jamal Remy) for for COPD.      no signs of exacerbation. REQUESTING ORFDER FOR CXR ( MISSED THE ORDER DONE BY DR Julia Kwon FOR FU LEONARDO)      Last modified by uma  10-, 09:13      Essential hypertension ()      Overview: 04- visitBP stable. continue meds      -Discussed checking BP at home occasionally and recording for f/u visit.      -Patient is aware to call our office if BP is greater than 150/90 mmHg or       less than 100/60 mmHg.      -Discussed low salt dieting and exercising 150 minutes/week. Last modified by DELORES Trevino  10-, 09:13      Gastroesophageal reflux disease ()      Overview: 10- visitnot controlled      change Zantac to Pepcid      Last modified by DELORES Trevino  10-, 09:13      Paroxysmal atrial fibrillation ([facility] (9/6/2021)      Lung mass (9/6/2021)      HFrEF (heart failure with reduced ejection fraction) ([facility] (10/25/2021)        Reason for Hospitalization:    Matt Cali is a 80year old female with a PMH of CAD, CHF, Hyperlipidemia, Asthma, lung mass that presented to the ED after the patient's daughter revoked hospice and presented to the ED for pain management. In the ED patient was found to have an elevated troponin, BNP of 780, and a CT that shown pleural effusions.  While admitted cardiologist was consulted and patient was diuresed, at this time the patient is stable for discharge back to home with [facility], patient requesting to have an hospital at home for comfort, spoke with case management Argenis Salmon and we are working on getting patient a hospital bed.    Elevated Troponin  -initial 0.36, 0.65, 0.67, 0.24  -likely secondary to lung mass versus elevated BNP  -EKG shown no ischemic changes  -cardiologist was consulted  -ECHO: LVEF is 60%. Biplane method used to measure ejection fraction. Normal cavity size and systolic function (ejection fraction normal). Upper normal wall thickness. Abnormal left ventricular septal motion consistent with right ventricular pacing. IVC: Moderately elevated central venous pressure (8 mmHg    Congested Heart Failure with nonischemic cardiomyopathy  -CXR and Chest CT shown pleural effusion  -continue Lasix 40 mg daily and Coreg    Paroxsymal Atrial Fibrillation  -rate controlled  -continue Eliquis and Coreg    Lung mass  -Chronic/known problem  -No intervention being taken at this time    Chronic obstructive lung disease (HCC)  -Maintain oxygen saturation above 92%, NC 3L by NC  -Continue with home inhalers of Brovana and albuterol    Hyperlipidemia   - Continue with Statin     GERD  - Continue with Pepcid     Discharge Medications:   Current Discharge Medication List      START taking these medications    Details   furosemide (LASIX) 40 mg tablet Take 1 Tablet by mouth daily. Qty: 30 Tablet, Refills: 0  Start date: 10/27/2021      potassium chloride SR (K-TAB) 20 mEq tablet Take 1 Tablet by mouth daily. Qty: 30 Tablet, Refills: 0  Start date: 10/27/2021         CONTINUE these medications which have NOT CHANGED    Details   famotidine (PEPCID) 20 mg tablet Take 1 Tablet by mouth two (2) times a day. Qty: 60 Tablet, Refills: 2    Associated Diagnoses: Gastroesophageal reflux disease without esophagitis      dicyclomine (BENTYL) 10 mg capsule Take 10 mg by mouth every six (6) hours as needed for Abdominal Cramps. albuterol (PROVENTIL HFA, VENTOLIN HFA, PROAIR HFA) 90 mcg/actuation inhaler Take 2 Puffs by inhalation every six (6) hours as needed for Wheezing.  Indications: chronic obstructive pulmonary disease  Qty: 1 Inhaler, Refills: 3    Associated Diagnoses: Chronic obstructive pulmonary disease, unspecified COPD type (HCC)      promethazine (PHENERGAN) 25 mg tablet Take 1 Tab by mouth every twelve (12) hours as needed for Nausea. Qty: 60 Tab, Refills: 3    Associated Diagnoses: Nausea      Linzess 72 mcg cap capsule Take 1 capsule by mouth once daily for 90 days  Qty: 90 Cap, Refills: 0      arformoteroL (BROVANA) 15 mcg/2 mL nebu neb solution 15 mcg by Nebulization route. Oxygen 2 Liters at night      methocarbamoL (ROBAXIN) 500 mg tablet Take 1 Tab by mouth four (4) times daily. Qty: 360 Tab, Refills: 3    Associated Diagnoses: Night muscle spasms      apixaban (ELIQUIS) 2.5 mg tablet Take 2.5 mg by mouth two (2) times a day. simvastatin (ZOCOR) 20 mg tablet Take 20 mg by mouth nightly. cyanocobalamin (VITAMIN B12) 500 mcg tablet Take 500 mcg by mouth daily. calcium citrate-vitamin d3 (CITRACAL+D) 315 mg-5 mcg (200 unit) tab Take 1 Tab by mouth daily (with breakfast). Omega-3 Fatty Acids 60- mg cpDR Take  by mouth.      multivitamin (ONE A DAY) tablet Take 1 Tab by mouth daily. carvediloL (COREG) 3.125 mg tablet Take 3.125 mg by mouth two (2) times daily (with meals). tamsulosin (Flomax) 0.4 mg capsule Take 0.4 mg by mouth daily. lidocaine (LIDODERM) 5 % Apply patch to the affected area for 12 hours a day and remove for 12 hours a day. Qty: 30 Each, Refills: 2    Associated Diagnoses: Trapezius muscle spasm      cetirizine (ZYRTEC) 5 mg tablet Take 5 mg by mouth daily. fluticasone propionate (FLONASE ALLERGY RELIEF NA) 1 Spray by Nasal route. levocetirizine (XYZAL) 5 mg tablet Take 5 mg by mouth daily. polyethylene glycol (MIRALAX) 17 gram/dose powder Take 17 g by mouth daily as needed. Follow up Care:    1. Graciela Lomeli MD in 1-2 weeks.       Follow-up Information     Follow up With Specialties Details Why Lasha Mario MD Internal Medicine   [address] [address]  [phone]          Patient Follow Up Instructions: Activity: Activity as tolerated  Diet:  Cardiac Diet    Condition at Discharge:  Stable  __________________________________________________________________    Disposition  Home Health Care Svc  ____________________________________________________________________    Code Status:  DNR  ___________________________________________________________________    Discharge Exam:  Patient seen and examined by me on discharge day. Pertinent Findings:  Gen:    Not in distress  Chest: Clear lungs  CVS:   Regular rhythm. No edema  Abd:  Soft, not distended, not tender  Neuro:  Alert    CONSULTATIONS: Cardiology    Significant Diagnostic Studies:   Recent Results (from the past 24 hour(s))   METABOLIC PANEL, BASIC    Collection Time: 10/27/21  4:43 AM   Result Value Ref Range    Sodium 142 135 - 145 mmol/L    Potassium 3.0 (L) 3.2 - 5.1 mmol/L    Chloride 105 94 - 111 mmol/L    CO2 28 21 - 33 mmol/L    Anion gap 9 mmol/L    Glucose 85 70 - 110 mg/dL    BUN 18 9 - 21 mg/dL    Creatinine 0.50 (L) 0.70 - 1.20 mg/dL    BUN/Creatinine ratio 36      GFR est AA >60 ml/min/1.73m2    GFR est non-AA >60 ml/min/1.73m2    Calcium 8.0 (L) 8.5 - 10.5 mg/dL   CBC WITH AUTOMATED DIFF    Collection Time: 10/27/21  4:43 AM   Result Value Ref Range    WBC 10.0 4.6 - 13.2 K/uL    RBC 3.91 (L) 4.20 - 5.30 M/uL    HGB 11.7 (L) 12.0 - 16.0 g/dL    HCT 38.1 35.0 - 45.0 %    MCV 97.4 78.0 - 100.0 FL    MCH 29.9 24.0 - 34.0 PG    MCHC 30.7 (L) 31.0 - 37.0 g/dL    RDW 14.9 (H) 11.6 - 14.5 %    PLATELET 986 906 - 046 K/uL    MPV 10.4 9.2 - 11.8 FL    NRBC 0.0 0.0  WBC    ABSOLUTE NRBC 0.00 0.00 - 0.01 K/uL    NEUTROPHILS 68 40 - 73 %    LYMPHOCYTES 23 21 - 52 %    MONOCYTES 8 3 - 10 %    EOSINOPHILS 1 0 - 5 %    BASOPHILS 0 0 - 2 %    IMMATURE GRANULOCYTES 0 0 - 0.5 %    ABS. NEUTROPHILS 6.8 1.8 - 8.0 K/UL    ABS.  LYMPHOCYTES 2.3 0.9 - 3.6 K/UL    ABS. MONOCYTES 0.8 0.05 - 1.2 K/UL    ABS. EOSINOPHILS 0.1 0.0 - 0.4 K/UL    ABS. BASOPHILS 0.0 0.0 - 0.1 K/UL    ABS. IMM. GRANS. 0.0 0.00 - 0.04 K/UL    DF AUTOMATED       CT ABD PELV WO CONT   Final Result      Small to moderate right pleural effusion with right lung base consolidation   suggesting atelectasis and/or infiltrate. Small left pleural effusion with left lung base atelectasis. Small pericardial effusion. Transverse, descending and sigmoid colon diverticulosis without evidence for   diverticulitis. Small amount of presacral fluid noted. XR CHEST PORT   Final Result         1. Small bilateral effusions with asymmetric airspace disease/atelectasis right   lower lobe.          Signed:  GELA Mcgovern  10/27/2021  1:12 PM

## 2021-10-27 NOTE — PROGRESS NOTES
Problem: Falls - Risk of  Goal: *Absence of Falls  Description: Document Juwan Gutierrez Fall Risk and appropriate interventions in the flowsheet.   Outcome: Progressing Towards Goal  Note: Fall Risk Interventions:       Mentation Interventions: Adequate sleep, hydration, pain control    Medication Interventions: Patient to call before getting OOB    Elimination Interventions: Call light in reach    History of Falls Interventions: Bed/chair exit alarm

## 2021-10-27 NOTE — PROGRESS NOTES
CARDIOLOGY PROGRESS NOTE - NP    Patient seen and examined. This is a patient who is followed for elevated troponin, acute heart failure, and atrial fibrillation. She is free of chest pain or shortness of breath. No other complaints reported. COVID PCR is pending. Telemetry reviewed, there were no events noted in the past 24 hours. She is ventricular paced; underlying rhythm is atrial fibrillation with rate in the 50s-60s. Pertinent review of systems items noted above, all other systems are negative. Current medications reviewed. PHYSICAL EXAMINATION:  Vital sign assessment reveal a blood pressure of 100/60 and pulse rate of 54. Cardiovascular exam has a heart with a regular rate and rhythm on telemetry. Respiratory exam reveals 3L oxygen use. Neurologic exam is nonfocal.  Further exam was deferred due to COVID-19 crisis. Recent labs results and imaging reviewed.   Notable findings include:   Recent Results (from the past 24 hour(s))   METABOLIC PANEL, BASIC    Collection Time: 10/27/21  4:43 AM   Result Value Ref Range    Sodium 142 135 - 145 mmol/L    Potassium 3.0 (L) 3.2 - 5.1 mmol/L    Chloride 105 94 - 111 mmol/L    CO2 28 21 - 33 mmol/L    Anion gap 9 mmol/L    Glucose 85 70 - 110 mg/dL    BUN 18 9 - 21 mg/dL    Creatinine 0.50 (L) 0.70 - 1.20 mg/dL    BUN/Creatinine ratio 36      GFR est AA >60 ml/min/1.73m2    GFR est non-AA >60 ml/min/1.73m2    Calcium 8.0 (L) 8.5 - 10.5 mg/dL   CBC WITH AUTOMATED DIFF    Collection Time: 10/27/21  4:43 AM   Result Value Ref Range    WBC 10.0 4.6 - 13.2 K/uL    RBC 3.91 (L) 4.20 - 5.30 M/uL    HGB 11.7 (L) 12.0 - 16.0 g/dL    HCT 38.1 35.0 - 45.0 %    MCV 97.4 78.0 - 100.0 FL    MCH 29.9 24.0 - 34.0 PG    MCHC 30.7 (L) 31.0 - 37.0 g/dL    RDW 14.9 (H) 11.6 - 14.5 %    PLATELET 526 797 - 397 K/uL    MPV 10.4 9.2 - 11.8 FL    NRBC 0.0 0.0  WBC    ABSOLUTE NRBC 0.00 0.00 - 0.01 K/uL    NEUTROPHILS 68 40 - 73 %    LYMPHOCYTES 23 21 - 52 %    MONOCYTES 8 3 - 10 %    EOSINOPHILS 1 0 - 5 %    BASOPHILS 0 0 - 2 %    IMMATURE GRANULOCYTES 0 0 - 0.5 %    ABS. NEUTROPHILS 6.8 1.8 - 8.0 K/UL    ABS. LYMPHOCYTES 2.3 0.9 - 3.6 K/UL    ABS. MONOCYTES 0.8 0.05 - 1.2 K/UL    ABS. EOSINOPHILS 0.1 0.0 - 0.4 K/UL    ABS. BASOPHILS 0.0 0.0 - 0.1 K/UL    ABS. IMM. GRANS. 0.0 0.00 - 0.04 K/UL    DF AUTOMATED       Discussed case with Dr. Sue Marin, and our impression and recommendations are as follows:  1. Elevated troponins: Troponins peaked at 0.67 and trended down. EKG is nonischemic. Troponin elevation is likely due to right lung mass and acute heart failure in the setting of nonischemic cardiomyopathy. Will defer aspirin therapy for now due to Eliquis use. Will continue beta blocker and statin. Echocardiogram obtained this admission shows preserved EF and no wall motion abnormalities. The patient will need outpatient ischemic evaluation. 2. Acute heart failure with preserved EF: Bilateral pleural effusions on abdomen/pelvis CT. . She was diuresed with Lasix 40 mg IV; now with low-normal BP readings. Will hold PO Lasix. Echocardiogram with preserved EF of 60% and no wall motion abnormalities. Continue telemetry monitoring. Keep serum potassium between 4-5 and serum magnesium > 2.  3. Paroxysmal atrial fibrillation: Rate is controlled. Hold carvedilol today due to low-normal BP readings; likely resume tomorrow for rate control. Continue Eliquis for anticoagulation. Continue telemetry monitoring. Keep serum potassium between 4-5 and serum magnesium > 2. Thank you for involving us in the care of this patient. Please do not hesitate to call me or Dr. Sue Marin if additional questions arise. If assistance is needed after hours or on weekends, please call the answering service at [phone].

## 2021-10-27 NOTE — PROGRESS NOTES
Problem: Mobility Impaired (Adult and Pediatric)  Goal: *Acute Goals and Plan of Care (Insert Text)  Description: Physical Therapy Goals  Initiated 10/25/2021 and to be accomplished within 7 day(s)  1. Patient will move from supine to sit and sit to supine , scoot up and down, and roll side to side in bed with supervision/set-up. 2.  Patient will transfer from bed to chair and chair to bed with minimal assistance/contact guard assist using the least restrictive device. 3.  Patient will perform sit to stand with minimal assistance/contact guard assist.  4.  Patient will ambulate with minimal assistance/contact guard assist for 25 feet with the least restrictive device. 5.  Patient will ascend/descend 4 stairs with 2 handrail(s) with minimal assistance/contact guard assist.    PLOF: Community ambulator who used a RW or cane and who was (I) with ADLs. Recently pt has been a household ambulator who used a RW with assist for ADLs from family. Outcome: Progressing Towards Goal   PHYSICAL THERAPY TREATMENT    Patient: Zari Ritchie (27 y.o. female)  Date: 10/27/2021  Diagnosis: HFrEF (heart failure with reduced ejection fraction) ([facility]) [I50.20]  Elevated troponin [R77.8] Elevated troponin       Precautions: Fall, Other (comment) (Monitor SpO2)    ASSESSMENT:  Pt agreeable to P.T. She comes to sit and then stand. She ambulates with a RW for a short distance and has some bowel incontinence during gait. She then returns to bed and nursing comes into room to help with clean up. Progression toward goals:   []      Improving appropriately and progressing toward goals  [x]      Improving slowly and progressing toward goals  []      Not making progress toward goals and plan of care will be adjusted     PLAN:  Patient continues to benefit from skilled intervention to address the above impairments. Continue treatment per established plan of care.   Discharge Recommendations:  Home Health, Home Physical Therapy, or Hospice  Further Equipment Recommendations for Discharge:  hospital bed     SUBJECTIVE:   Patient stated i'm just so sleepy.     OBJECTIVE DATA SUMMARY:   Critical Behavior:  Neurologic State: Alert  Orientation Level: Oriented X4  Functional Mobility Training:  Bed Mobility:  Rolling: Minimum assistance  Supine to Sit: Minimum assistance  Sit to Supine: Modified independent  Scooting: Contact guard assistance  Transfers:  Sit to Stand: Minimum assistance  Stand to Sit: Minimum assistance  Balance:  Sitting: Intact  Standing: Impaired  Standing - Static: Fair  Standing - Dynamic : Fair   Ambulation/Gait Training:  Distance (ft): 30 Feet (ft)  Assistive Device: Walker, rolling  Ambulation - Level of Assistance: Contact guard assistance  Gait Description (WDL): Exceptions to WDL  Gait Abnormalities: Other (flexed posture)  Pain:  Pain level pre-treatment: 0/10  Pain level post-treatment: 0/10   Pain Location: N/A  Activity Tolerance:   Poor  Please refer to the flowsheet for vital signs taken during this treatment. After treatment:   [] Patient left in no apparent distress sitting up in chair  [x] Patient left in no apparent distress in bed  [x] Call bell left within reach  [x] Nursing notified  [] Caregiver present  [] Bed alarm activated  [] SCDs applied      COMMUNICATION/EDUCATION:   [x]         Role of Physical Therapy in the acute care setting. [x]         Fall prevention education was provided and the patient/caregiver indicated understanding. [x]         Patient/family have participated as able in working toward goals and plan of care. [x]         Patient/family agree to work toward stated goals and plan of care. []         Patient understands intent and goals of therapy, but is neutral about his/her participation.   []         Patient is unable to participate in stated goals/plan of care: ongoing with therapy staff.  []         Other:        Jay Andre PT, DPT   Time Calculation: 06 mins

## 2021-10-27 NOTE — PROGRESS NOTES
Pt cleaned of incontinent stool and urine, bed in lowest position, fresh ice water given, wt obtained, call bell in reach.

## 2021-10-28 ENCOUNTER — PATIENT OUTREACH (OUTPATIENT)
Dept: CASE MANAGEMENT | Age: 86
End: 2021-10-28

## 2021-10-28 NOTE — PROGRESS NOTES
Care Transitions Initial Call    Call within 2 business days of discharge: Yes     Patient: Britton [facility] Patient : 1934 MRN: [id number]    Last Discharge [address]       Complaint Diagnosis Description Type Department Provider    10/25/21 Generalized Body Aches Acute on chronic combined systolic and diastolic congestive heart failure ([facility] [address] . .. ED to Hosp-Admission (Discharged) (ADMIT) Phyllis Villasenor MD; Almas Orellana. .. Was this an external facility discharge? No Discharge Facility: n/a    Challenges to be reviewed by the provider   Additional needs identified to be addressed with provider: no  none         Method of communication with provider : none    Discussed COVID-19 related testing which was available at this time. Test results were negative. Patient informed of results, if available? Unable to inform,  as Pt. Kept the conversation. NOVEL CORONAVIRUS (COVID-19)  Order: [id number]  Collected:  10/25/2021 03:28 Status:  Final result   0 Result Notes  Component 10/25/21 0328   SARS-CoV-2 Not Detected              Advance Care Planning:   Does patient have an Advance Directive:  noted on file. Inpatient Readmission Risk score: Unplanned Readmit Risk Score: 19.9    Was this a readmission? yes   Patient stated reason for the admission: c/o  pain all over, chest heaviness, shortness of breath. Patients top risk factors for readmission: chronic medical condition   Interventions to address risk factors: Scheduled appointment with PCP-Closely follow up with PCP. Care Transition Nurse (CTN) contacted the patient by telephone to perform post hospital discharge assessment. Verified name and  with patient as identifiers. Provided introduction to self, and explanation of the CTN role. Patient reports that she is doing fine. No CP or SOB at this time as per Pt. No new or worsening of symptoms reported by Pt. At this time.   Patient states that home health came and saw her today. Pt. States that she is no longer in hospice. Pt. Is aware of her upcoming VV apt with PCP. Pt. Malik Roberson med review at this time. Reminded Pt. to go to the nearest emergency room for chest pain, shortness of breath, returning of symptoms that brought her to the emergency room and/or worsening of symptoms. Pt. Verbalized and repeated back understanding. No questions, concerns and/or needs at this time as per Pt. Pt. Kept the conversation short . CTN reviewed discharge instructions, medical action plan and red flags with patient who verbalized understanding. Were discharge instructions available to patient? yes. Reviewed appropriate site of care based on symptoms and resources available to patient including: PCP and When to call 911. Patient given an opportunity to ask questions and does not have any further questions or concerns at this time. The patient agrees to contact the PCP office for questions related to their healthcare. Medication reconciliation was not performed with patient, Pt. Declined. Referral to Pharm D needed: Will defer with Pt. PCP. Home Health/Outpatient orders at discharge: home health care  [address]: [facility]   Date of initial visit: 10/28/21 per Pt. Durable Medical Equipment ordered at discharge: [address]: [employer]  Durable Medical Equipment received: Possible delivery date today 10/28/21 per Pt. Covid Risk Education    Educated patient about risk for severe COVID-19 due to risk factors according to CDC guidelines. CTN reviewed discharge instructions, medical action plan and red flag symptoms with the patient who verbalized understanding. Discussed exposure protocols and quarantine with CDC Guidelines. Patient was given an opportunity to verbalize any questions and concerns and agrees to contact CTN or health care provider for questions related to their healthcare.     Was patient discharged with a pulse oximeter? no  Discussed follow-up appointments. If no appointment was previously scheduled, appointment scheduling offered: already have scheduled PCP apt. . Is follow up appointment scheduled within 7 days of discharge? yes. [facility] follow up appointment(s):   Future Appointments   Date Time Provider Leola Bartlett   11/3/2021 11:00 AM Valeria Lomax MD [facility]     Non-[facility] follow up appointment(s): none noted at this time. Plan for follow-up call in 7-10 days based on severity of symptoms and risk factors. Plan for next call: symptoms, PCP apt. assess for any questions or needs. CTN provided contact information for future needs. Goals Addressed                 This Visit's Progress     Prevent complications post hospitalization. Patient will attend VV with PCP as scheduled. Pt. Will continue to take all medications as prescribed. Pt. Will call CTN or PCP office for any questions, concerns and/or needs.  Understands red flags post discharge. Patient will go to the nearest emergency room for chest pain, shortness of breath, returning of symptoms that brought her to the emergency room and/or worsening of symptoms. Patient will contact PCP office for any questions or concerns related to healthcare.

## 2021-11-01 ENCOUNTER — TELEPHONE (OUTPATIENT)
Dept: INTERNAL MEDICINE CLINIC | Age: 86
End: 2021-11-01

## 2021-11-01 NOTE — TELEPHONE ENCOUNTER
Marta with [address] called stated patient has had diarrhea and abdominal pain for 2 days she would like to know what she can take for this.   [phone]

## 2021-11-03 ENCOUNTER — VIRTUAL VISIT (OUTPATIENT)
Dept: INTERNAL MEDICINE CLINIC | Age: 86
End: 2021-11-03
Payer: MEDICARE

## 2021-11-03 DIAGNOSIS — Z09 HOSPITAL DISCHARGE FOLLOW-UP: ICD-10-CM

## 2021-11-03 DIAGNOSIS — R11.0 NAUSEA: Primary | ICD-10-CM

## 2021-11-03 DIAGNOSIS — E87.6 HYPOKALEMIA: ICD-10-CM

## 2021-11-03 PROCEDURE — G8427 DOCREV CUR MEDS BY ELIG CLIN: HCPCS | Performed by: INTERNAL MEDICINE

## 2021-11-03 PROCEDURE — 1111F DSCHRG MED/CURRENT MED MERGE: CPT | Performed by: INTERNAL MEDICINE

## 2021-11-03 PROCEDURE — 99496 TRANSJ CARE MGMT HIGH F2F 7D: CPT | Performed by: INTERNAL MEDICINE

## 2021-11-03 RX ORDER — PROMETHAZINE HYDROCHLORIDE 12.5 MG/1
12.5 TABLET ORAL
Qty: 60 TABLET | Refills: 1 | Status: SHIPPED | OUTPATIENT
Start: 2021-11-03

## 2021-11-03 RX ORDER — PROMETHAZINE HYDROCHLORIDE 12.5 MG/1
12.5 TABLET ORAL
COMMUNITY
End: 2021-11-03 | Stop reason: SDUPTHER

## 2021-11-03 NOTE — PROGRESS NOTES
Discharged from [facility] on 10/27/2021 for CHF and elevated troponin. Alejandrina Shaw presents today for   Chief Complaint   Patient presents with   [facility] Follow Up       Depression Screening:  3 most recent PHQ Screens 11/3/2021   Little interest or pleasure in doing things Not at all   Feeling down, depressed, irritable, or hopeless Not at all   Total Score PHQ 2 0       Learning Assessment:  Learning Assessment 1/25/2021   PRIMARY LEARNER Patient   HIGHEST LEVEL OF EDUCATION - PRIMARY LEARNER  GRADUATED HIGH SCHOOL OR GED   BARRIERS PRIMARY LEARNER NONE   PRIMARY LANGUAGE ENGLISH   LEARNER PREFERENCE PRIMARY READING   ANSWERED BY patient   RELATIONSHIP SELF       Fall Risk  Fall Risk Assessment, last 12 mths 11/3/2021   Able to walk? Yes   Fall in past 12 months? 0   Do you feel unsteady? 0   Are you worried about falling 0   Fall with injury? -       ADL  ADL Assessment 11/3/2021   Feeding yourself No Help Needed   Getting from bed to chair Help Needed   Getting dressed No Help Needed   Bathing or showering No Help Needed   Walk across the room (includes cane/walker) Help Needed   Using the telphone No Help Needed   Taking your medications No Help Needed   Preparing meals Help Needed   Managing money (expenses/bills) No Help Needed   Moderately strenuous housework (laundry) Help Needed   Shopping for personal items (toiletries/medicines) Help Needed   Shopping for groceries Help Needed   Driving Help Needed   Climbing a flight of stairs Help Needed   Getting to places beyond walking distances Help Needed       Health Maintenance reviewed and discussed and ordered per Provider. Health Maintenance Due   Topic Date Due    DTaP/Tdap/Td series (1 - Tdap) Never done    Shingrix Vaccine Age 50> (1 of 2) Never done    Bone Densitometry (Dexa) Screening  Never done   . Coordination of Care:  1. \"Have you been to the ER, urgent care clinic since your last visit? Hospitalized since your last visit? \" Yes Where: [facility]    2. \"Have you seen or consulted any other health care providers outside of the [address] [place] [address] since your last visit? \" No

## 2021-11-03 NOTE — PROGRESS NOTES
I was at my office in [place], [place] while conducting this encounter. The patient is at home. Consent:  Patient and/or HIS/HER healthcare decision maker is aware that this patient-initiated Telehealth encounter is a billable service, with coverage as determined by patient's insurance carrier. Patient is aware that He/She may receive a bill and has provided verbal consent to proceed: Consent has been obtained within past 12 months of the date of this encounter. This virtual visit was conducted via Telephone    1. Nausea  This is a chronic issue for this patient. I did explain that she cannot take Phenergan and potassium simultaneously due to the risk of GI upset. - promethazine (PHENERGAN) 12.5 mg tablet; Take 1 Tablet by mouth every six (6) hours as needed for Nausea. Dispense: 60 Tablet; Refill: 1    2. Hypokalemia  I emphasized to the patient that she must get her labs drawn by 13 November to follow-up on her potassium  - METABOLIC PANEL, BASIC    3. Hospital discharge follow-up  This was a transitional care visit. Case management from [facility] core reached out on October October 28, 2021. The patient was discharged on October 27 from [facility]. I am seeing the patient within 7 days. I have reconciled her medication and reviewed all data associated with her most recent hospitalization including cardiology's consult notes and hospitalist progress notes and discharge summaries. I have also reviewed the case management notes. - ME DISCHARGE MEDS RECONCILED W/ CURRENT OUTPATIENT MED LIST       Chief Complaint   Patient presents with   [facility] Follow Up        HPI   This is a delightful 63-year-old female with advanced COPD and heart failure who began to feel unwell and presented to the emergency department just over a week ago. She had previously been in the hospital more than 1 occasion for decompensated heart failure. She had also previously had C. difficile colitis.  The patient reports that she was admitted to the hospital and according to the medical records the patient was aggressively diuresed. She was seen in consultation with cardiology as well. She was seen by physical therapy and noted to be significantly deconditioned and thus the patient is now receiving physical therapy and home health at home. She reports her oxygen status is stable. She reports she feels very tired though. She denies any new shortness of breath nausea vomiting or diarrhea and otherwise reports she is doing better. Of note the patient does have a lung mass which was evaluated by pulmonary. Patient also is noted to have paroxysmal atrial fibrillation. Current Outpatient Medications on File Prior to Visit   Medication Sig Dispense Refill    furosemide (LASIX) 40 mg tablet Take 1 Tablet by mouth daily. 30 Tablet 0    potassium chloride SR (K-TAB) 20 mEq tablet Take 1 Tablet by mouth daily. 30 Tablet 0    famotidine (PEPCID) 20 mg tablet Take 1 Tablet by mouth two (2) times a day. 60 Tablet 2    dicyclomine (BENTYL) 10 mg capsule Take 10 mg by mouth every six (6) hours as needed for Abdominal Cramps.  tamsulosin (Flomax) 0.4 mg capsule Take 0.4 mg by mouth daily.  albuterol (PROVENTIL HFA, VENTOLIN HFA, PROAIR HFA) 90 mcg/actuation inhaler Take 2 Puffs by inhalation every six (6) hours as needed for Wheezing. Indications: chronic obstructive pulmonary disease 1 Inhaler 3    lidocaine (LIDODERM) 5 % Apply patch to the affected area for 12 hours a day and remove for 12 hours a day. 30 Each 2    Linzess 72 mcg cap capsule Take 1 capsule by mouth once daily for 90 days 90 Cap 0    arformoteroL (BROVANA) 15 mcg/2 mL nebu neb solution 15 mcg by Nebulization route.  cetirizine (ZYRTEC) 5 mg tablet Take 5 mg by mouth daily.  fluticasone propionate (FLONASE ALLERGY RELIEF NA) 1 [place] by Nasal route.  levocetirizine (XYZAL) 5 mg tablet Take 5 mg by mouth daily.       Oxygen 2 Liters at night      methocarbamoL (ROBAXIN) 500 mg tablet Take 1 Tab by mouth four (4) times daily. 360 Tab 3    apixaban (ELIQUIS) 2.5 mg tablet Take 2.5 mg by mouth two (2) times a day.  simvastatin (ZOCOR) 20 mg tablet Take 20 mg by mouth nightly.  cyanocobalamin (VITAMIN B12) 500 mcg tablet Take 500 mcg by mouth daily.  calcium citrate-vitamin d3 (CITRACAL+D) 315 mg-5 mcg (200 unit) tab Take 1 Tab by mouth daily (with breakfast).  Omega-3 Fatty Acids 60- mg cpDR Take  by mouth.  multivitamin (ONE A DAY) tablet Take 1 Tab by mouth daily.  polyethylene glycol (MIRALAX) 17 gram/dose powder Take 17 g by mouth daily as needed.  carvediloL (COREG) 3.125 mg tablet Take 3.125 mg by mouth two (2) times daily (with meals). No current facility-administered medications on file prior to visit.         Patient Active Problem List   Diagnosis Code    Allergic rhinitis J30.9    Cardiac pacemaker in situ Z95.0    Chronic constipation K59.09    Chronic obstructive lung disease ([facility] [address] J44.9    Congestive heart failure (CHF) ([facility]) I50.9    Coronary arteriosclerosis I25.10    Diverticular disease K57.90    Essential hypertension I10    Gastroesophageal reflux disease K21.9    Hyperlipidemia E78.5    Myocardial infarction ([facility] [address] I21.9    Osteopenia M85.80    Pulmonary hypertension ([facility]) I27.20    Colitis K52.9    Urinary retention R33.9    Paroxysmal atrial fibrillation ([facility]) I48.0    Abdominal pain R10.9    Lung mass R91.8    Hypokalemia E87.6    COPD (chronic obstructive pulmonary disease) ([facility]) J44.9    Diverticulitis K57.92    Dehydration E86.0    Physical deconditioning R53.81    Clostridium difficile colitis A04.72    Elevated troponin R77.8    HFrEF (heart failure with reduced ejection fraction) ([facility]) I50.20             Total Time Spent on this Encounter: total time spent was greater than 21 minutes

## 2021-11-04 ENCOUNTER — TELEPHONE (OUTPATIENT)
Dept: INTERNAL MEDICINE CLINIC | Age: 86
End: 2021-11-04

## 2021-11-04 NOTE — TELEPHONE ENCOUNTER
Sam Paniagua with [facility] is at patients house, stated patient is having pain in stomach and loose stools.   [phone]

## 2021-11-05 ENCOUNTER — PATIENT OUTREACH (OUTPATIENT)
Dept: CASE MANAGEMENT | Age: 86
End: 2021-11-05

## 2021-11-05 NOTE — PROGRESS NOTES
Care Transitions Follow Up Call    Challenges to be reviewed by the provider   Additional needs identified to be addressed with provider: no  none           Method of communication with provider : none    Care Transition Nurse (CTN) contacted the patient by telephone to follow up. Verified name and  with patient as identifiers. Patient reports that she is doing okay. Pt. Denied CP, SOB, fever, chills, Nausea and vomiting. Pt. States that she is still having lose stool but not worsening. Pt. States that she spoke with Dr. Jenniffer mitchell and she knows when to go to urgent care or ED. No questions, concerns and/or needs at this time as per Pt. Pt.kept the conversation short and thanked us for follow up call. CTN reviewed discharge instructions, medical action plan and red flags with patient and discussed any barriers to care and/or understanding of plan of care after discharge. Discussed appropriate site of care based on symptoms and resources available to patient including: PCP, After hours contact [phone], Urgent Care Clinics and When to call 911. The patient agrees to contact the PCP office for questions related to their healthcare. CTN provided contact information for future needs. Plan for follow-up call in 10-14 days based on severity of symptoms and risk factors. Plan for next call: symptoms , assess for any needs. superficial abrasions to left dorsal aspect of hand from previous cutting behavior. no surrounding warmth or erythema

## 2021-11-12 ENCOUNTER — HOSPITAL ENCOUNTER (EMERGENCY)
Age: 86
Discharge: HOME | End: 2021-11-12
Attending: EMERGENCY MEDICINE
Payer: MEDICARE

## 2021-11-12 ENCOUNTER — APPOINTMENT (OUTPATIENT)
Dept: CT IMAGING | Age: 86
End: 2021-11-12
Attending: EMERGENCY MEDICINE
Payer: MEDICARE

## 2021-11-12 ENCOUNTER — APPOINTMENT (OUTPATIENT)
Dept: GENERAL RADIOLOGY | Age: 86
End: 2021-11-12
Attending: EMERGENCY MEDICINE
Payer: MEDICARE

## 2021-11-12 VITALS
TEMPERATURE: 97.9 F | RESPIRATION RATE: 22 BRPM | HEIGHT: 63 IN | HEART RATE: 78 BPM | WEIGHT: 118 LBS | SYSTOLIC BLOOD PRESSURE: 115 MMHG | OXYGEN SATURATION: 95 % | BODY MASS INDEX: 20.91 KG/M2 | DIASTOLIC BLOOD PRESSURE: 71 MMHG

## 2021-11-12 DIAGNOSIS — K57.20 COLONIC DIVERTICULAR ABSCESS: ICD-10-CM

## 2021-11-12 DIAGNOSIS — K57.90 DIVERTICULAR DISEASE: Primary | ICD-10-CM

## 2021-11-12 DIAGNOSIS — R10.84 GENERALIZED ABDOMINAL PAIN: ICD-10-CM

## 2021-11-12 DIAGNOSIS — R91.8 LUNG MASS: ICD-10-CM

## 2021-11-12 DIAGNOSIS — R10.84 ABDOMINAL PAIN, GENERALIZED: ICD-10-CM

## 2021-11-12 DIAGNOSIS — K56.7 ILEUS OF UNSPECIFIED TYPE (HCC): ICD-10-CM

## 2021-11-12 DIAGNOSIS — K57.92 DIVERTICULITIS: ICD-10-CM

## 2021-11-12 LAB
ALBUMIN SERPL-MCNC: 2.4 G/DL (ref 3.5–4.7)
ALBUMIN/GLOB SERPL: 0.5 {RATIO}
ALP SERPL-CCNC: 85 U/L (ref 38–126)
ALT SERPL-CCNC: 13 U/L (ref 3–52)
ANION GAP SERPL CALC-SCNC: 8 MMOL/L
APPEARANCE UR: ABNORMAL
AST SERPL W P-5'-P-CCNC: 21 U/L (ref 14–74)
BACTERIA URNS QL MICRO: ABNORMAL /HPF
BASOPHILS # BLD: 0 K/UL (ref 0–0.1)
BASOPHILS NFR BLD: 0 % (ref 0–2)
BILIRUB SERPL-MCNC: 0.6 MG/DL (ref 0.2–1)
BILIRUB UR QL: NEGATIVE
BUN SERPL-MCNC: 9 MG/DL (ref 9–21)
BUN/CREAT SERPL: 18
CA-I BLD-MCNC: 8.4 MG/DL (ref 8.5–10.5)
CHLORIDE SERPL-SCNC: 103 MMOL/L (ref 94–111)
CO2 SERPL-SCNC: 22 MMOL/L (ref 21–33)
COLOR UR: ABNORMAL
CREAT SERPL-MCNC: 0.5 MG/DL (ref 0.7–1.2)
DIFFERENTIAL METHOD BLD: ABNORMAL
EOSINOPHIL # BLD: 0 K/UL (ref 0–0.4)
EOSINOPHIL NFR BLD: 0 % (ref 0–5)
EPITH CASTS URNS QL MICRO: ABNORMAL /LPF (ref 0–20)
ERYTHROCYTE [DISTWIDTH] IN BLOOD BY AUTOMATED COUNT: 14.7 % (ref 11.6–14.5)
GLOBULIN SER CALC-MCNC: 4.5 G/DL
GLUCOSE SERPL-MCNC: 92 MG/DL (ref 70–110)
GLUCOSE UR STRIP.AUTO-MCNC: NEGATIVE MG/DL
HCT VFR BLD AUTO: 39 % (ref 35–45)
HGB BLD-MCNC: 12.3 G/DL (ref 12–16)
HGB UR QL STRIP: NEGATIVE
IMM GRANULOCYTES # BLD AUTO: 0.1 K/UL (ref 0–0.04)
IMM GRANULOCYTES NFR BLD AUTO: 1 % (ref 0–0.5)
KETONES UR QL STRIP.AUTO: NEGATIVE MG/DL
LACTATE SERPL-SCNC: 0.9 MMOL/L (ref 0.5–2)
LEUKOCYTE ESTERASE UR QL STRIP.AUTO: ABNORMAL
LIPASE SERPL-CCNC: 19 U/L (ref 10–57)
LYMPHOCYTES # BLD: 2.7 K/UL (ref 0.9–3.6)
LYMPHOCYTES NFR BLD: 21 % (ref 21–52)
MCH RBC QN AUTO: 30.6 PG (ref 24–34)
MCHC RBC AUTO-ENTMCNC: 31.5 G/DL (ref 31–37)
MCV RBC AUTO: 97 FL (ref 78–100)
MONOCYTES # BLD: 0.8 K/UL (ref 0.05–1.2)
MONOCYTES NFR BLD: 6 % (ref 3–10)
NEUTS SEG # BLD: 9.6 K/UL (ref 1.8–8)
NEUTS SEG NFR BLD: 72 % (ref 40–73)
NITRITE UR QL STRIP.AUTO: POSITIVE
NRBC # BLD: 0 K/UL (ref 0–0.01)
NRBC BLD-RTO: 0 PER 100 WBC
PH UR STRIP: 6 [PH] (ref 5–8)
PHOSPHATE SERPL-MCNC: 2.4 MG/DL (ref 2.5–4.5)
PLATELET # BLD AUTO: 339 K/UL (ref 135–420)
PMV BLD AUTO: 9.3 FL (ref 9.2–11.8)
POTASSIUM SERPL-SCNC: 4.5 MMOL/L (ref 3.2–5.1)
PROT SERPL-MCNC: 6.9 G/DL (ref 6.1–8.4)
PROT UR STRIP-MCNC: ABNORMAL MG/DL
RBC # BLD AUTO: 4.02 M/UL (ref 4.2–5.3)
RBC #/AREA URNS HPF: ABNORMAL /HPF (ref 0–2)
SODIUM SERPL-SCNC: 133 MMOL/L (ref 135–145)
SP GR UR REFRACTOMETRY: 1.02 (ref 1–1.03)
TSH SERPL DL<=0.05 MIU/L-ACNC: 1.42 UIU/ML (ref 0.35–6.2)
UROBILINOGEN UR QL STRIP.AUTO: 0.2 EU/DL (ref 0.2–1)
WBC # BLD AUTO: 13.2 K/UL (ref 4.6–13.2)
WBC URNS QL MICRO: ABNORMAL /HPF (ref 0–4)

## 2021-11-12 PROCEDURE — 71045 X-RAY EXAM CHEST 1 VIEW: CPT

## 2021-11-12 PROCEDURE — 74177 CT ABD & PELVIS W/CONTRAST: CPT

## 2021-11-12 PROCEDURE — 83605 ASSAY OF LACTIC ACID: CPT

## 2021-11-12 PROCEDURE — 74011000258 HC RX REV CODE- 258: Performed by: EMERGENCY MEDICINE

## 2021-11-12 PROCEDURE — 99284 EMERGENCY DEPT VISIT MOD MDM: CPT

## 2021-11-12 PROCEDURE — 83690 ASSAY OF LIPASE: CPT

## 2021-11-12 PROCEDURE — 81001 URINALYSIS AUTO W/SCOPE: CPT

## 2021-11-12 PROCEDURE — 74011000250 HC RX REV CODE- 250: Performed by: EMERGENCY MEDICINE

## 2021-11-12 PROCEDURE — 80053 COMPREHEN METABOLIC PANEL: CPT

## 2021-11-12 PROCEDURE — 87040 BLOOD CULTURE FOR BACTERIA: CPT

## 2021-11-12 PROCEDURE — 96375 TX/PRO/DX INJ NEW DRUG ADDON: CPT

## 2021-11-12 PROCEDURE — 74011250636 HC RX REV CODE- 250/636: Performed by: EMERGENCY MEDICINE

## 2021-11-12 PROCEDURE — 96361 HYDRATE IV INFUSION ADD-ON: CPT

## 2021-11-12 PROCEDURE — 96374 THER/PROPH/DIAG INJ IV PUSH: CPT

## 2021-11-12 PROCEDURE — 84443 ASSAY THYROID STIM HORMONE: CPT

## 2021-11-12 PROCEDURE — 84100 ASSAY OF PHOSPHORUS: CPT

## 2021-11-12 PROCEDURE — 74011000636 HC RX REV CODE- 636: Performed by: EMERGENCY MEDICINE

## 2021-11-12 PROCEDURE — C9113 INJ PANTOPRAZOLE SODIUM, VIA: HCPCS | Performed by: EMERGENCY MEDICINE

## 2021-11-12 PROCEDURE — 85025 COMPLETE CBC W/AUTO DIFF WBC: CPT

## 2021-11-12 RX ORDER — TRAMADOL HYDROCHLORIDE 50 MG/1
1 TABLET ORAL AS NEEDED
COMMUNITY
Start: 2021-09-01 | End: 2021-11-12

## 2021-11-12 RX ORDER — ACETAMINOPHEN 325 MG/1
650 TABLET ORAL
COMMUNITY
Start: 2021-09-29 | End: 2021-11-12

## 2021-11-12 RX ORDER — PANTOPRAZOLE SODIUM 40 MG/1
1 TABLET, DELAYED RELEASE ORAL DAILY
COMMUNITY
Start: 2021-10-31

## 2021-11-12 RX ORDER — METRONIDAZOLE 500 MG/1
500 TABLET ORAL 3 TIMES DAILY
Qty: 30 TABLET | Refills: 0 | Status: SHIPPED | OUTPATIENT
Start: 2021-11-12 | End: 2021-11-22

## 2021-11-12 RX ORDER — HYDROCODONE BITARTRATE AND ACETAMINOPHEN 5; 325 MG/1; MG/1
1 TABLET ORAL
Qty: 12 TABLET | Refills: 0 | Status: SHIPPED | OUTPATIENT
Start: 2021-11-12 | End: 2021-11-15

## 2021-11-12 RX ORDER — MORPHINE SULFATE 2 MG/ML
2 INJECTION, SOLUTION INTRAMUSCULAR; INTRAVENOUS
Status: COMPLETED | OUTPATIENT
Start: 2021-11-12 | End: 2021-11-12

## 2021-11-12 RX ORDER — AMLODIPINE BESYLATE 5 MG/1
1 TABLET ORAL DAILY
COMMUNITY
Start: 2021-11-10

## 2021-11-12 RX ORDER — CIPROFLOXACIN 500 MG/1
500 TABLET ORAL 2 TIMES DAILY
Qty: 14 TABLET | Refills: 0 | Status: SHIPPED | OUTPATIENT
Start: 2021-11-12 | End: 2021-11-19

## 2021-11-12 RX ADMIN — IOPAMIDOL 100 ML: 755 INJECTION, SOLUTION INTRAVENOUS at 14:43

## 2021-11-12 RX ADMIN — SODIUM CHLORIDE 40 MG: 9 INJECTION, SOLUTION INTRAMUSCULAR; INTRAVENOUS; SUBCUTANEOUS at 15:00

## 2021-11-12 RX ADMIN — MORPHINE SULFATE 2 MG: 2 INJECTION, SOLUTION INTRAMUSCULAR; INTRAVENOUS at 15:01

## 2021-11-12 RX ADMIN — PIPERACILLIN AND TAZOBACTAM 3.38 G: 3; .375 INJECTION, POWDER, LYOPHILIZED, FOR SOLUTION INTRAVENOUS at 17:07

## 2021-11-12 RX ADMIN — SODIUM CHLORIDE 500 ML: 9 INJECTION, SOLUTION INTRAVENOUS at 15:00

## 2021-11-12 NOTE — ED TRIAGE NOTES
Abdominal pain - chronic - since at least Aug, 28. Has been seen at this facility for same issue, pain in left lower and right lower quadrant 9/10. Has not f/u with GI since last visit at this facility or after any other visit for this same issue.

## 2021-11-12 NOTE — PROGRESS NOTES
Received call from Nursing Supervisor about seeing patient in the ER. Pt is with [facility] at present. She has decided she wants a different Hospice Agency other than [facility], that she has had before. I have called Omero Hensley [phone] with [facility]. Waiting for call back.

## 2021-11-12 NOTE — ED PROVIDER NOTES
EMERGENCY DEPARTMENT HISTORY AND PHYSICAL EXAM      Date: 11/12/2021  Patient Name: Kwabena Richards      History of Presenting Illness     Chief Complaint   Patient presents with    Abdominal Pain       History Provided By: Patient    HPI: Kwabena Richards, 80 y.o. female with a past medical history significant Failure to thrive, CHF, Elevated troponin, C Diff, Diverticulitis presents to the ED with cc of Abdominal pain  3 months. States she is unable to follow up with GI and is home bound. She was asked to follow up but states she cannot and wants to see GI here. Her abdominal pain has not changed since her last discharge. She denies fever, nausea and vomiting. Patient states she has a chronic lung mass with probable mets in abdomen. She states she was a Hospice patient and signed DNR and also requested her PPM be turned off because she was afraid it would keep bringing her back if her heart stopped. She states she signed out of Hospice because they promised to send a doctor and nurse to turn it off but they didn't. she also claims she was supposed to be on pain medication but never got it. She requests admission. There are no other complaints, changes, or physical findings at this time. PCP: Samira Fernando MD    Current Facility-Administered Medications   Medication Dose Route Frequency Provider Last Rate Last Admin    piperacillin-tazobactam (ZOSYN) 3.375 g in 0.9% sodium chloride (MBP/ADV) 100 mL MBP  3.375 g IntraVENous NOW Talha DICKERSON MD 50 mL/hr at 11/12/21 1707 3.375 g at 11/12/21 1707     Current Outpatient Medications   Medication Sig Dispense Refill    ciprofloxacin HCl (Cipro) 500 mg tablet Take 1 Tablet by mouth two (2) times a day for 7 days. 14 Tablet 0    metroNIDAZOLE (FlagyL) 500 mg tablet Take 1 Tablet by mouth three (3) times daily for 10 days.  30 Tablet 0    HYDROcodone-acetaminophen (Norco) 5-325 mg per tablet Take 1 Tablet by mouth every six (6) hours as needed for Pain for up to 3 days. Max Daily Amount: 4 Tablets. 12 Tablet 0    amLODIPine (NORVASC) 5 mg tablet Take 1 Tablet by mouth daily.  pantoprazole (PROTONIX) 40 mg tablet Take 1 Tablet by mouth daily.  promethazine (PHENERGAN) 12.5 mg tablet Take 1 Tablet by mouth every six (6) hours as needed for Nausea. 60 Tablet 1    furosemide (LASIX) 40 mg tablet Take 1 Tablet by mouth daily. 30 Tablet 0    potassium chloride SR (K-TAB) 20 mEq tablet Take 1 Tablet by mouth daily. 30 Tablet 0    famotidine (PEPCID) 20 mg tablet Take 1 Tablet by mouth two (2) times a day. 60 Tablet 2    dicyclomine (BENTYL) 10 mg capsule Take 10 mg by mouth every six (6) hours as needed for Abdominal Cramps.  tamsulosin (Flomax) 0.4 mg capsule Take 0.4 mg by mouth daily.  albuterol (PROVENTIL HFA, VENTOLIN HFA, PROAIR HFA) 90 mcg/actuation inhaler Take 2 Puffs by inhalation every six (6) hours as needed for Wheezing. Indications: chronic obstructive pulmonary disease 1 Inhaler 3    lidocaine (LIDODERM) 5 % Apply patch to the affected area for 12 hours a day and remove for 12 hours a day. 30 Each 2    Linzess 72 mcg cap capsule Take 1 capsule by mouth once daily for 90 days 90 Cap 0    arformoteroL (BROVANA) 15 mcg/2 mL nebu neb solution 15 mcg by Nebulization route.  cetirizine (ZYRTEC) 5 mg tablet Take 5 mg by mouth daily.  fluticasone propionate (FLONASE ALLERGY RELIEF NA) 1 Severance by Nasal route.  levocetirizine (XYZAL) 5 mg tablet Take 5 mg by mouth daily.  Oxygen 2 Liters at night      methocarbamoL (ROBAXIN) 500 mg tablet Take 1 Tab by mouth four (4) times daily. 360 Tab 3    apixaban (ELIQUIS) 2.5 mg tablet Take 2.5 mg by mouth two (2) times a day.  simvastatin (ZOCOR) 20 mg tablet Take 20 mg by mouth nightly.  cyanocobalamin (VITAMIN B12) 500 mcg tablet Take 500 mcg by mouth daily.  Omega-3 Fatty Acids 60- mg cpDR Take  by mouth.       multivitamin (ONE A DAY) tablet Take 1 Tab by mouth daily.  carvediloL (COREG) 3.125 mg tablet Take 3.125 mg by mouth two (2) times daily (with meals). Past History     Past Medical History:  Past Medical History:   Diagnosis Date    Abdominal pain, chronic, generalized     at belly button area    Arthritis     Asthma     CAD (coronary artery disease)     Diverticulitis     Heart disease     High cholesterol     Hyperlipidemia        Past Surgical History:  Past Surgical History:   Procedure Laterality Date    HX APPENDECTOMY      HX COLONOSCOPY  01/26/2015    per gi no further colonoscopy needed due to age of the patient, see note 1/26/15    HX HERNIA REPAIR Left 2001    inguinal    HX HYSTERECTOMY      AK CARDIAC SURG PROCEDURE UNLIST  2007    defibrillator placed       Family History:  Family History   Problem Relation Age of Onset    Emphysema Mother        Social History:  Social History     Tobacco Use    Smoking status: Former Smoker    Smokeless tobacco: Never Used   Vaping Use    Vaping Use: Never used   Substance Use Topics    Alcohol use: Not Currently    Drug use: Never       Allergies: Allergies   Allergen Reactions    Codeine Nausea and Vomiting         Review of Systems     Review of Systems   Constitutional: Positive for activity change. HENT: Negative. Respiratory: Negative. Cardiovascular: Negative. Gastrointestinal: Positive for abdominal pain. Genitourinary: Negative. Musculoskeletal: Negative. Neurological: Negative. All other systems reviewed and are negative. Physical Exam     Physical Exam  Vitals and nursing note reviewed. Constitutional:       Appearance: She is ill-appearing. She is not toxic-appearing. Comments: Cachectic   HENT:      Head: Normocephalic. Cardiovascular:      Rate and Rhythm: Normal rate and regular rhythm. Heart sounds: No murmur heard.       Pulmonary:      Effort: Pulmonary effort is normal.      Breath sounds: Normal breath sounds. Abdominal:      Palpations: Abdomen is soft. Tenderness: There is generalized abdominal tenderness. There is no guarding or rebound. Hernia: No hernia is present. Skin:     General: Skin is warm. Neurological:      General: No focal deficit present. Mental Status: She is alert and oriented to person, place, and time. Lab and Diagnostic Study Results     Labs -     Recent Results (from the past 12 hour(s))   CBC WITH AUTOMATED DIFF    Collection Time: 11/12/21 12:55 PM   Result Value Ref Range    WBC 13.2 4.6 - 13.2 K/uL    RBC 4.02 (L) 4.20 - 5.30 M/uL    HGB 12.3 12.0 - 16.0 g/dL    HCT 39.0 35.0 - 45.0 %    MCV 97.0 78.0 - 100.0 FL    MCH 30.6 24.0 - 34.0 PG    MCHC 31.5 31.0 - 37.0 g/dL    RDW 14.7 (H) 11.6 - 14.5 %    PLATELET 690 483 - 343 K/uL    MPV 9.3 9.2 - 11.8 FL    NRBC 0.0 0.0  WBC    ABSOLUTE NRBC 0.00 0.00 - 0.01 K/uL    NEUTROPHILS 72 40 - 73 %    LYMPHOCYTES 21 21 - 52 %    MONOCYTES 6 3 - 10 %    EOSINOPHILS 0 0 - 5 %    BASOPHILS 0 0 - 2 %    IMMATURE GRANULOCYTES 1 (H) 0 - 0.5 %    ABS. NEUTROPHILS 9.6 (H) 1.8 - 8.0 K/UL    ABS. LYMPHOCYTES 2.7 0.9 - 3.6 K/UL    ABS. MONOCYTES 0.8 0.05 - 1.2 K/UL    ABS. EOSINOPHILS 0.0 0.0 - 0.4 K/UL    ABS. BASOPHILS 0.0 0.0 - 0.1 K/UL    ABS. IMM. GRANS. 0.1 (H) 0.00 - 0.04 K/UL    DF AUTOMATED     METABOLIC PANEL, COMPREHENSIVE    Collection Time: 11/12/21 12:55 PM   Result Value Ref Range    Sodium 133 (L) 135 - 145 mmol/L    Potassium 4.5 3.2 - 5.1 mmol/L    Chloride 103 94 - 111 mmol/L    CO2 22 21 - 33 mmol/L    Anion gap 8 mmol/L    Glucose 92 70 - 110 mg/dL    BUN 9 9 - 21 mg/dL    Creatinine 0.50 (L) 0.70 - 1.20 mg/dL    BUN/Creatinine ratio 18      GFR est AA >60 ml/min/1.73m2    GFR est non-AA >60 ml/min/1.73m2    Calcium 8.4 (L) 8.5 - 10.5 mg/dL    Bilirubin, total 0.6 0.2 - 1.0 mg/dL    AST (SGOT) 21 14 - 74 U/L    ALT (SGPT) 13 3 - 52 U/L    Alk.  phosphatase 85 38 - 126 U/L    Protein, total 6.9 6.1 - 8.4 g/dL    Albumin 2.4 (L) 3.5 - 4.7 g/dL    Globulin 4.5 g/dL    A-G Ratio 0.5     LIPASE    Collection Time: 11/12/21 12:55 PM   Result Value Ref Range    Lipase 19 10 - 57 U/L   LACTIC ACID    Collection Time: 11/12/21 12:55 PM   Result Value Ref Range    Lactic acid 0.9 0.5 - 2.0 mmol/L   URINALYSIS W/ RFLX MICROSCOPIC    Collection Time: 11/12/21 12:55 PM   Result Value Ref Range    Color Dark Yellow      Appearance Cloudy      Specific gravity 1.016 1.005 - 1.030      pH (UA) 6.0 5.0 - 8.0      Protein Trace (A) Negative mg/dL    Glucose Negative Negative mg/dL    Ketone Negative Negative mg/dL    Bilirubin Negative Negative      Blood Negative Negative      Urobilinogen 0.2 0.2 - 1.0 EU/dL    Nitrites Positive (A) Negative      Leukocyte Esterase Large (A) Negative     PHOSPHORUS    Collection Time: 11/12/21 12:55 PM   Result Value Ref Range    Phosphorus 2.4 (L) 2.5 - 4.5 mg/dL   TSH 3RD GENERATION    Collection Time: 11/12/21 12:55 PM   Result Value Ref Range    TSH 1.42 0.35 - 6.20 uIU/mL   URINE MICROSCOPIC    Collection Time: 11/12/21 12:55 PM   Result Value Ref Range    WBC  0 - 4 /hpf    RBC 0-5 0 - 2 /hpf    Epithelial cells Few 0 - 20 /lpf    Bacteria 3+ (A) None /hpf   CULTURE, BLOOD, PAIRED    Collection Time: 11/12/21  4:35 PM    Specimen: Blood   Result Value Ref Range    Special Requests: No Special Requests      Culture result: PENDING        Radiologic Studies -   [unfilled]  CT Results  (Last 48 hours)               11/12/21 1450  CT ABD PELV W CONT Final result    Impression:      1. Marked sigmoid colonic thickening and inflammatory stranding; the appearance   of which favors an infectious/inflammatory colitis/diverticulitis.      > Small elliptical fluid and gas collection adjacent to the inflamed colonic   segment.      > No gross free intraperitoneal perforation.       > Several mildly dilated fluid-filled loops of small bowel and adjacent   proximity likely reactive ileus.       2. Small right pleural effusion and right basilar atelectasis. > Low attenuating right lower lobe mass lesion on chest CT 9/6/2021 not   included within the imaged field of view for this exam.           Narrative:  EXAM: CT of the abdomen and pelvis       INDICATION: Weakness and abdominal pain       COMPARISON: CT performed 10/25/2021; CT chest 9/7/2021       TECHNIQUE: Axial CT imaging of the abdomen and pelvis was performed with   intravenous contrast. Multiplanar reformats were generated. One or more dose reduction techniques were used on this CT: automated exposure   control, adjustment of the mAs and/or kVp according to patient size, and   iterative reconstruction techniques. The specific techniques used on this CT   exam have been documented in the patient's electronic medical record. Digital   Imaging and Communications in Medicine (DICOM) format image data are available   to nonaffiliated external healthcare facilities or entities on a secure, media   free, reciprocally searchable basis with patient authorization for at least a   12-month period after this study. _______________       FINDINGS:       LOWER CHEST: Cardiac size within normal limits. Small pericardial effusion. Biventricular pacemaker/defibrillator leads in situ. Mild bibasilar atelectasis. LIVER, BILIARY: Liver is normal. No biliary dilation. Gallbladder is   unremarkable. PANCREAS: Normal.       SPLEEN: Normal.       ADRENALS: Normal.       KIDNEYS/URETERS/BLADDER: Renal parenchymal enhancement is symmetric. There is no   hydronephrosis involving either kidney. Contrast bolus timing results in   opacification of the renal collecting systems bilaterally. Urinary bladder is   unremarkable. PELVIC ORGANS: Uterus is surgically absent. VASCULATURE: There is diffuse aortic atherosclerosis. Visceral arterial and   venous branches are patent.        LYMPH NODES: Scattered subcentimeter mesenteric and retroperitoneal lymph nodes   are present without evidence of abdominal or pelvic adenopathy. GASTROINTESTINAL TRACT: There is marked thickening and inflammatory change of   the sigmoid colon, encompassing several inflamed appearing colonic diverticula. There is an elliptical configuration collection measuring approximately 3.6 x   1.3 x 2.0 cm in size which appears to be extraluminal although and very close   proximation to the adjacent inflamed colonic wall.      > No gross free intraperitoneal perforation. > Several mildly dilated loops of small bowel noted within the adjacent   pelvis, likely reactive ileus. Darlynn Magic BONES: No acute or aggressive osseous abnormalities identified. Multilevel   spondylosis as well as mild compression deformities of the T11 and L5 vertebral   bodies are noted. Lower lumbar predominant facet joint osteoarthritis, as   previously. OTHER: None.       _______________               CXR Results  (Last 48 hours)               11/12/21 1331  XR CHEST PORT Final result    Impression:          1. Redemonstration of right lower lobe masslike consolidation, without   superimposed acute radiographic cardiopulmonary abnormality. Narrative:  EXAM: XR CHEST PORT       CLINICAL INDICATION/HISTORY: Weakness   -Additional: None       COMPARISON: 10/25/2021       TECHNIQUE: Frontal view of the chest       _______________       FINDINGS:       HEART AND MEDIASTINUM: Left subclavian approach biventricular   pacemaker/defibrillator with lead tips in stable position. Stable appearing   cardiac size and mediastinal contours. LUNGS AND PLEURAL SPACES: Masslike consolidation right lower lobe as noted on CT   scan 9/7/2021 No evidence of pneumothorax or pleural effusion.        BONY THORAX AND SOFT TISSUES: No acute osseous abnormality       _______________                 Medical Decision Making and ED Course   - I am the first and primary provider for this patient AND AM THE PRIMARY PROVIDER OF RECORD. - I reviewed the vital signs, available nursing notes, past medical history, past surgical history, family history and social history. - Initial assessment performed. The patients presenting problems have been discussed, and the staff are in agreement with the care plan formulated and outlined with them. I have encouraged them to ask questions as they arise throughout their visit. Vital Signs-Reviewed the patient's vital signs. Patient Vitals for the past 12 hrs:   Temp Pulse Resp BP SpO2   11/12/21 1237 97.9 °F (36.6 °C) 75 19 99/61 95 %         Records Reviewed: Nursing Notes and Old Medical Records    The patient presents with     ED Course:              Provider Notes (Medical Decision Making):     MDM  Number of Diagnoses or Management Options     Amount and/or Complexity of Data Reviewed  Clinical lab tests: reviewed and ordered  Tests in the radiology section of CPT®: reviewed and ordered    Risk of Complications, Morbidity, and/or Mortality  Presenting problems: high  Diagnostic procedures: high  Management options: high  General comments: 3:45p Patient declined surgical consult to evaluate possible intra abdominal abscess. I discussed with Nursing supervisor who will involve case management. Discussed with  Vishnu Edmond at bedside along with patient's sister. She requests new Hospice team provided they will assure she gets pain management. She still wants her PPM turned off which I could not promise. She refused Surgical, Radiation Onc, evaluation and does not want her probable metastatic lung cancer addressed. She states she knows she probably has cancer and does not want to address it except for pain control. She declined surgical evaluation of possible diverticular abscess. She states she is DNR and Sister confirms she has it on file. She wishes to be discharged on antibiotics and pain medication.                Consultations:       Consultations: - Case Management. .. We have asked for emergent assistance with regard to this patients social and medical outpatient needs. They will discuss and evaluate the patient        Procedures and Critical Care       Performed by: Asad Scherer MD  PROCEDURES:  Procedures                     Disposition     Disposition: [place] Discharge: I informed the pt that they needed admission for adequate evaluation for their abdominal pain and that by refusing the above, they at risk for further deterioration. They are awake, alert, and they understand their condition and the risks involved in leaving. They are clinically aware of their surroundings and able to ask appropriate questions. The patients relative and the nurse present confirms They are not clinically intoxicated and able to make medical decisions. They have verbalized knowing the risks and understood it was recommended that they stay and could also return at any time. The patient's relative was present for the discussion and also verbalized that they understood both diagnosis, risks and could return at any time. They were both provided with warnings regarding worsening of Their condition and were provided instructions to follow up with their PCP tomorrow or return to the Emergency Room as soon as possible. This discussion was witnessed by nurse  Ezio Swan. Remove if not discharged  DISCHARGE PLAN:  1. Current Discharge Medication List      CONTINUE these medications which have NOT CHANGED    Details   acetaminophen (TYLENOL) 325 mg tablet Take 650 mg by mouth every four (4) hours as needed. amLODIPine (NORVASC) 5 mg tablet Take 1 Tablet by mouth daily. traMADoL (ULTRAM) 50 mg tablet Take 1 Tablet by mouth as needed. pantoprazole (PROTONIX) 40 mg tablet Take 1 Tablet by mouth daily. promethazine (PHENERGAN) 12.5 mg tablet Take 1 Tablet by mouth every six (6) hours as needed for Nausea.   Qty: 60 Tablet, Refills: 1    Associated Diagnoses: Nausea      furosemide (LASIX) 40 mg tablet Take 1 Tablet by mouth daily. Qty: 30 Tablet, Refills: 0      potassium chloride SR (K-TAB) 20 mEq tablet Take 1 Tablet by mouth daily. Qty: 30 Tablet, Refills: 0      famotidine (PEPCID) 20 mg tablet Take 1 Tablet by mouth two (2) times a day. Qty: 60 Tablet, Refills: 2    Associated Diagnoses: Gastroesophageal reflux disease without esophagitis      dicyclomine (BENTYL) 10 mg capsule Take 10 mg by mouth every six (6) hours as needed for Abdominal Cramps. tamsulosin (Flomax) 0.4 mg capsule Take 0.4 mg by mouth daily. albuterol (PROVENTIL HFA, VENTOLIN HFA, PROAIR HFA) 90 mcg/actuation inhaler Take 2 Puffs by inhalation every six (6) hours as needed for Wheezing. Indications: chronic obstructive pulmonary disease  Qty: 1 Inhaler, Refills: 3    Associated Diagnoses: Chronic obstructive pulmonary disease, unspecified COPD type (HCC)      lidocaine (LIDODERM) 5 % Apply patch to the affected area for 12 hours a day and remove for 12 hours a day. Qty: 30 Each, Refills: 2    Associated Diagnoses: Trapezius muscle spasm      Linzess 72 mcg cap capsule Take 1 capsule by mouth once daily for 90 days  Qty: 90 Cap, Refills: 0      arformoteroL (BROVANA) 15 mcg/2 mL nebu neb solution 15 mcg by Nebulization route. cetirizine (ZYRTEC) 5 mg tablet Take 5 mg by mouth daily. fluticasone propionate (FLONASE ALLERGY RELIEF NA) 1 Spray by Nasal route. levocetirizine (XYZAL) 5 mg tablet Take 5 mg by mouth daily. Oxygen 2 Liters at night      methocarbamoL (ROBAXIN) 500 mg tablet Take 1 Tab by mouth four (4) times daily. Qty: 360 Tab, Refills: 3    Associated Diagnoses: Night muscle spasms      apixaban (ELIQUIS) 2.5 mg tablet Take 2.5 mg by mouth two (2) times a day. simvastatin (ZOCOR) 20 mg tablet Take 20 mg by mouth nightly. cyanocobalamin (VITAMIN B12) 500 mcg tablet Take 500 mcg by mouth daily.       calcium citrate-vitamin d3 (CITRACAL+D) 315 mg-5 mcg (200 unit) tab Take 1 Tab by mouth daily (with breakfast). Omega-3 Fatty Acids 60- mg cpDR Take  by mouth.      multivitamin (ONE A DAY) tablet Take 1 Tab by mouth daily. polyethylene glycol (MIRALAX) 17 gram/dose powder Take 17 g by mouth daily as needed. carvediloL (COREG) 3.125 mg tablet Take 3.125 mg by mouth two (2) times daily (with meals). 2.   Follow-up Information     Follow up With Specialties Details Why Contact Info    Julissa Ladd MD Internal Medicine In 3 days  [address] [place] [address]  [phone]          3. Return to ED if worse   4. Current Discharge Medication List      START taking these medications    Details   ciprofloxacin HCl (Cipro) 500 mg tablet Take 1 Tablet by mouth two (2) times a day for 7 days. Qty: 14 Tablet, Refills: 0  Start date: 11/12/2021, End date: 11/19/2021      metroNIDAZOLE (FlagyL) 500 mg tablet Take 1 Tablet by mouth three (3) times daily for 10 days. Qty: 30 Tablet, Refills: 0  Start date: 11/12/2021, End date: 11/22/2021      HYDROcodone-acetaminophen (Norco) 5-325 mg per tablet Take 1 Tablet by mouth every six (6) hours as needed for Pain for up to 3 days. Max Daily Amount: 4 Tablets. Qty: 12 Tablet, Refills: 0  Start date: 11/12/2021, End date: 11/15/2021    Associated Diagnoses: Generalized abdominal pain; Diverticular disease             Diagnosis     Clinical Impression:   1. Diverticular disease    2. Abdominal pain, generalized    3. Diverticulitis    4. Lung mass    5. Colonic diverticular abscess    6. Ileus of unspecified type ([address]    7. Generalized abdominal pain        Attestations:    Vita Acuna MD    Please note that this dictation was completed with [employer], the computer voice recognition software. Quite often unanticipated grammatical, syntax, homophones, and other interpretive errors are inadvertently transcribed by the computer software. Please disregard these errors. Please excuse any errors that have escaped final proofreading. Thank you.

## 2021-11-12 NOTE — PROGRESS NOTES
Notified case management of patient return to the ER. Patient reported that she dropped Hospice because of lack of care and pain management. Currently being followed by [facility] that her daughter arranged. Patient reported that no pain medication has be presciped by PCP or hospice. She reports trying to tolerate pain at home but is in severe pain now with no relief.

## 2021-11-15 ENCOUNTER — TELEPHONE (OUTPATIENT)
Dept: INTERNAL MEDICINE CLINIC | Age: 86
End: 2021-11-15

## 2021-11-15 ENCOUNTER — HOSPITAL ENCOUNTER (EMERGENCY)
Age: 86
Discharge: HOSPICE | End: 2021-11-15
Attending: EMERGENCY MEDICINE
Payer: MEDICARE

## 2021-11-15 VITALS
DIASTOLIC BLOOD PRESSURE: 72 MMHG | TEMPERATURE: 98.8 F | BODY MASS INDEX: 20.91 KG/M2 | HEIGHT: 63 IN | RESPIRATION RATE: 16 BRPM | SYSTOLIC BLOOD PRESSURE: 119 MMHG | HEART RATE: 85 BPM | OXYGEN SATURATION: 95 % | WEIGHT: 118 LBS

## 2021-11-15 DIAGNOSIS — C79.9 METASTATIC MALIGNANT NEOPLASM, UNSPECIFIED SITE (HCC): ICD-10-CM

## 2021-11-15 DIAGNOSIS — R69 TERMINAL ILLNESS: Primary | ICD-10-CM

## 2021-11-15 LAB
ALBUMIN SERPL-MCNC: 2.2 G/DL (ref 3.5–4.7)
ALBUMIN/GLOB SERPL: 0.5 {RATIO}
ALP SERPL-CCNC: 77 U/L (ref 38–126)
ALT SERPL-CCNC: 12 U/L (ref 3–52)
ANION GAP SERPL CALC-SCNC: 8 MMOL/L
AST SERPL W P-5'-P-CCNC: 28 U/L (ref 14–74)
BASOPHILS # BLD: 0.1 K/UL (ref 0–0.1)
BASOPHILS NFR BLD: 1 % (ref 0–2)
BILIRUB DIRECT SERPL-MCNC: 0.4 MG/DL (ref 0–0.3)
BILIRUB SERPL-MCNC: 1.3 MG/DL (ref 0.2–1)
BUN SERPL-MCNC: 7 MG/DL (ref 9–21)
BUN/CREAT SERPL: 12
CA-I BLD-MCNC: 8.3 MG/DL (ref 8.5–10.5)
CHLORIDE SERPL-SCNC: 104 MMOL/L (ref 94–111)
CO2 SERPL-SCNC: 25 MMOL/L (ref 21–33)
CREAT SERPL-MCNC: 0.6 MG/DL (ref 0.7–1.2)
DIFFERENTIAL METHOD BLD: ABNORMAL
EOSINOPHIL # BLD: 0.1 K/UL (ref 0–0.4)
EOSINOPHIL NFR BLD: 1 % (ref 0–5)
ERYTHROCYTE [DISTWIDTH] IN BLOOD BY AUTOMATED COUNT: 14.8 % (ref 11.6–14.5)
GLOBULIN SER CALC-MCNC: 4.5 G/DL
GLUCOSE SERPL-MCNC: 90 MG/DL (ref 70–110)
HCT VFR BLD AUTO: 37.7 % (ref 35–45)
HGB BLD-MCNC: 11.6 G/DL (ref 12–16)
IMM GRANULOCYTES # BLD AUTO: 0.1 K/UL (ref 0–0.04)
IMM GRANULOCYTES NFR BLD AUTO: 1 % (ref 0–0.5)
LIPASE SERPL-CCNC: 21 U/L (ref 10–57)
LYMPHOCYTES # BLD: 2.5 K/UL (ref 0.9–3.6)
LYMPHOCYTES NFR BLD: 25 % (ref 21–52)
MCH RBC QN AUTO: 29.8 PG (ref 24–34)
MCHC RBC AUTO-ENTMCNC: 30.8 G/DL (ref 31–37)
MCV RBC AUTO: 96.9 FL (ref 78–100)
MONOCYTES # BLD: 0.6 K/UL (ref 0.05–1.2)
MONOCYTES NFR BLD: 6 % (ref 3–10)
NEUTS SEG # BLD: 6.5 K/UL (ref 1.8–8)
NEUTS SEG NFR BLD: 66 % (ref 40–73)
NRBC # BLD: 0 K/UL (ref 0–0.01)
NRBC BLD-RTO: 0 PER 100 WBC
PLATELET # BLD AUTO: 362 K/UL (ref 135–420)
PMV BLD AUTO: 9.4 FL (ref 9.2–11.8)
POTASSIUM SERPL-SCNC: 4.8 MMOL/L (ref 3.2–5.1)
PROT SERPL-MCNC: 6.7 G/DL (ref 6.1–8.4)
RBC # BLD AUTO: 3.89 M/UL (ref 4.2–5.3)
SODIUM SERPL-SCNC: 137 MMOL/L (ref 135–145)
WBC # BLD AUTO: 9.8 K/UL (ref 4.6–13.2)

## 2021-11-15 PROCEDURE — 74011250636 HC RX REV CODE- 250/636: Performed by: EMERGENCY MEDICINE

## 2021-11-15 PROCEDURE — 80048 BASIC METABOLIC PNL TOTAL CA: CPT

## 2021-11-15 PROCEDURE — 80076 HEPATIC FUNCTION PANEL: CPT

## 2021-11-15 PROCEDURE — 85025 COMPLETE CBC W/AUTO DIFF WBC: CPT

## 2021-11-15 PROCEDURE — 96376 TX/PRO/DX INJ SAME DRUG ADON: CPT

## 2021-11-15 PROCEDURE — 96374 THER/PROPH/DIAG INJ IV PUSH: CPT

## 2021-11-15 PROCEDURE — 96375 TX/PRO/DX INJ NEW DRUG ADDON: CPT

## 2021-11-15 PROCEDURE — 83690 ASSAY OF LIPASE: CPT

## 2021-11-15 PROCEDURE — 99284 EMERGENCY DEPT VISIT MOD MDM: CPT

## 2021-11-15 RX ORDER — FENTANYL CITRATE 50 UG/ML
50 INJECTION, SOLUTION INTRAMUSCULAR; INTRAVENOUS
Status: COMPLETED | OUTPATIENT
Start: 2021-11-15 | End: 2021-11-15

## 2021-11-15 RX ORDER — OXYCODONE HYDROCHLORIDE 5 MG/1
5 TABLET ORAL
Qty: 12 TABLET | Refills: 0 | Status: SHIPPED | OUTPATIENT
Start: 2021-11-15 | End: 2021-11-18

## 2021-11-15 RX ORDER — ONDANSETRON 4 MG/1
4 TABLET, ORALLY DISINTEGRATING ORAL
Qty: 20 TABLET | Refills: 0 | Status: SHIPPED | OUTPATIENT
Start: 2021-11-15

## 2021-11-15 RX ORDER — ONDANSETRON 2 MG/ML
4 INJECTION INTRAMUSCULAR; INTRAVENOUS
Status: COMPLETED | OUTPATIENT
Start: 2021-11-15 | End: 2021-11-15

## 2021-11-15 RX ADMIN — FENTANYL CITRATE 50 MCG: 50 INJECTION, SOLUTION INTRAMUSCULAR; INTRAVENOUS at 08:36

## 2021-11-15 RX ADMIN — ONDANSETRON 4 MG: 2 INJECTION INTRAMUSCULAR; INTRAVENOUS at 08:34

## 2021-11-15 RX ADMIN — SODIUM CHLORIDE 1000 ML: 9 INJECTION, SOLUTION INTRAVENOUS at 08:34

## 2021-11-15 RX ADMIN — FENTANYL CITRATE 50 MCG: 50 INJECTION, SOLUTION INTRAMUSCULAR; INTRAVENOUS at 13:17

## 2021-11-15 NOTE — TELEPHONE ENCOUNTER
Patient is being discharged from ER today and patients daughter Barney Coley [phone] is requesting a RX for when patient is in pain, patient had a V V on 11/03

## 2021-11-15 NOTE — ED NOTES
Daughter states that she is unable to transport pt home due to condition and O2. [facility] called for transport.

## 2021-11-15 NOTE — ED PROVIDER NOTES
EMERGENCY DEPARTMENT HISTORY AND PHYSICAL EXAM      Date: 11/15/2021  Patient Name: Cookie Magdaleno    History of Presenting Illness     Chief Complaint   Patient presents with    Abdominal Pain       History Provided By: Patient and Patient's Daughter    HPI: Cookie Magdaleno, 80 y.o. female with a past medical history significant Failure to thrive, CHF, Elevated troponin, C Diff, Diverticulitis presents to the ED with cc of Abdominal pain . Patient seen in ED a day ago and refused any further evaluation of her pain and wanted to go home on antibiotics and pain medication. Case management was involved because patient stated she was a hospice patient and signed DNR and also had requested a PPM to be turned off but she  signed out of hospice because she states she had been promised to have a doctor sent and a nurse to turn off her PPM  but that was not done. She is returning because of worsening pain and is again requesting new hospice. She was discharged on Vicodin yesterday but she only took half dose, as she thought she was allergic to it is she is allergic to codeine. She had very little relief with the half tablet of the Vicodin. States all she wants is hip pain to be under control. She still does not want to be admitted. Her daughter is at bedside and confirms the same. Daughter states that she had talked with a new hospice program in [place] who were supposed to be coming down to see her mother. There are no other complaints, changes, or physical findings at this time.     There are no other complaints, changes, or physical findings at this time. PCP: Maria L Choe., MD    No current facility-administered medications on file prior to encounter. Current Outpatient Medications on File Prior to Encounter   Medication Sig Dispense Refill    amLODIPine (NORVASC) 5 mg tablet Take 1 Tablet by mouth daily.  pantoprazole (PROTONIX) 40 mg tablet Take 1 Tablet by mouth daily.  ciprofloxacin HCl (Cipro) 500 mg tablet Take 1 Tablet by mouth two (2) times a day for 7 days. 14 Tablet 0    metroNIDAZOLE (FlagyL) 500 mg tablet Take 1 Tablet by mouth three (3) times daily for 10 days. 30 Tablet 0    HYDROcodone-acetaminophen (Norco) 5-325 mg per tablet Take 1 Tablet by mouth every six (6) hours as needed for Pain for up to 3 days. Max Daily Amount: 4 Tablets. 12 Tablet 0    promethazine (PHENERGAN) 12.5 mg tablet Take 1 Tablet by mouth every six (6) hours as needed for Nausea. 60 Tablet 1    furosemide (LASIX) 40 mg tablet Take 1 Tablet by mouth daily. 30 Tablet 0    potassium chloride SR (K-TAB) 20 mEq tablet Take 1 Tablet by mouth daily. 30 Tablet 0    famotidine (PEPCID) 20 mg tablet Take 1 Tablet by mouth two (2) times a day. 60 Tablet 2    dicyclomine (BENTYL) 10 mg capsule Take 10 mg by mouth every six (6) hours as needed for Abdominal Cramps.  tamsulosin (Flomax) 0.4 mg capsule Take 0.4 mg by mouth daily.  albuterol (PROVENTIL HFA, VENTOLIN HFA, PROAIR HFA) 90 mcg/actuation inhaler Take 2 Puffs by inhalation every six (6) hours as needed for Wheezing. Indications: chronic obstructive pulmonary disease 1 Inhaler 3    lidocaine (LIDODERM) 5 % Apply patch to the affected area for 12 hours a day and remove for 12 hours a day. 30 Each 2    Linzess 72 mcg cap capsule Take 1 capsule by mouth once daily for 90 days 90 Cap 0    arformoteroL (BROVANA) 15 mcg/2 mL nebu neb solution 15 mcg by Nebulization route.  cetirizine (ZYRTEC) 5 mg tablet Take 5 mg by mouth daily.  fluticasone propionate (FLONASE ALLERGY RELIEF NA) 1 [place] by Nasal route.  levocetirizine (XYZAL) 5 mg tablet Take 5 mg by mouth daily.  Oxygen 2 Liters at night      methocarbamoL (ROBAXIN) 500 mg tablet Take 1 Tab by mouth four (4) times daily. 360 Tab 3    apixaban (ELIQUIS) 2.5 mg tablet Take 2.5 mg by mouth two (2) times a day.       simvastatin (ZOCOR) 20 mg tablet Take 20 mg by mouth nightly.  cyanocobalamin (VITAMIN B12) 500 mcg tablet Take 500 mcg by mouth daily.  Omega-3 Fatty Acids 60- mg cpDR Take  by mouth.  multivitamin (ONE A DAY) tablet Take 1 Tab by mouth daily.  carvediloL (COREG) 3.125 mg tablet Take 3.125 mg by mouth two (2) times daily (with meals). Past History     Past Medical History:  Past Medical History:   Diagnosis Date    Abdominal pain, chronic, generalized     at belly button area    Arthritis     Asthma     CAD (coronary artery disease)     Diverticulitis     Heart disease     High cholesterol     Hyperlipidemia        Past Surgical History:  Past Surgical History:   Procedure Laterality Date    HX APPENDECTOMY      HX COLONOSCOPY  01/26/2015    per gi no further colonoscopy needed due to age of the patient, see note 1/26/15    HX HERNIA REPAIR Left 2001    inguinal    HX HYSTERECTOMY      NV CARDIAC SURG PROCEDURE UNLIST  2007    defibrillator placed       Family History:  Family History   Problem Relation Age of Onset    Emphysema Mother        Social History:  Social History     Tobacco Use    Smoking status: Former Smoker    Smokeless tobacco: Never Used   Vaping Use    Vaping Use: Never used   Substance Use Topics    Alcohol use: Not Currently    Drug use: Never       Allergies: Allergies   Allergen Reactions    Codeine Nausea and Vomiting         Review of Systems     Review of Systems   Reason unable to perform ROS: poor historian, groaning in pain, unable to give much history. Constitutional: Negative for chills and fever. HENT: Negative for congestion and sore throat. Respiratory: Negative for cough and shortness of breath. Cardiovascular: Negative for chest pain. Gastrointestinal: Positive for abdominal pain. Negative for abdominal distention, nausea and vomiting.    Genitourinary: Negative for difficulty urinating, dysuria, flank pain, frequency, hematuria, urgency, vaginal bleeding and vaginal discharge. Musculoskeletal: Negative for arthralgias and joint swelling. Skin: Negative for rash and wound. Neurological: Negative for dizziness, weakness, light-headedness and headaches. Hematological: Negative for adenopathy. Physical Exam     Physical Exam  Vitals and nursing note reviewed. Constitutional:       General: She is not in acute distress. Appearance: She is well-developed. She is not diaphoretic. Comments: Chronically ill looking frail female who appears uncomfortable but in no acute distress. HENT:      Head: Normocephalic and atraumatic. Jaw: No trismus. Right Ear: External ear normal. No swelling or tenderness. Tympanic membrane is not perforated, erythematous or bulging. Left Ear: External ear normal. No swelling or tenderness. Tympanic membrane is not perforated, erythematous or bulging. Nose: Nose normal. No mucosal edema or rhinorrhea. Right Sinus: No maxillary sinus tenderness or frontal sinus tenderness. Left Sinus: No maxillary sinus tenderness or frontal sinus tenderness. Mouth/Throat:      Mouth: No oral lesions. Dentition: No dental abscesses. Pharynx: Uvula midline. No oropharyngeal exudate, posterior oropharyngeal erythema or uvula swelling. Tonsils: No tonsillar abscesses. Eyes:      General: No scleral icterus. Right eye: No discharge. Left eye: No discharge. Conjunctiva/sclera: Conjunctivae normal.   Cardiovascular:      Rate and Rhythm: Normal rate and regular rhythm. Heart sounds: Normal heart sounds. No murmur heard. No friction rub. No gallop. Pulmonary:      Effort: Pulmonary effort is normal. No tachypnea, accessory muscle usage or respiratory distress. Breath sounds: Normal breath sounds. No decreased breath sounds, wheezing, rhonchi or rales. Abdominal:      Palpations: Abdomen is soft. Tenderness: There is generalized abdominal tenderness.  There is guarding. Musculoskeletal:         General: No tenderness. Normal range of motion. Cervical back: Normal range of motion and neck supple. Lymphadenopathy:      Cervical: No cervical adenopathy. Skin:     General: Skin is warm and dry. Findings: No erythema or rash. Neurological:      Mental Status: She is alert and oriented to person, place, and time. Psychiatric:         Judgment: Judgment normal.         Lab and Diagnostic Study Results     Labs -   No results found for this or any previous visit (from the past 12 hour(s)). Radiologic Studies -   @lastxrresult@  CT Results  (Last 48 hours)    None        CXR Results  (Last 48 hours)    None            Medical Decision Making   - I am the first provider for this patient. - I reviewed the vital signs, available nursing notes, past medical history, past surgical history, family history and social history. - Initial assessment performed. The patients presenting problems have been discussed, and they are in agreement with the care plan formulated and outlined with them. I have encouraged them to ask questions as they arise throughout their visit. Vital Signs-Reviewed the patient's vital signs. No data found. Records Reviewed: Nursing Notes, Old Medical Records, Previous Radiology Studies and Previous Laboratory Studies    The patient presents with abdominal pain with a differential diagnosis of abdominal pain, appendicitis, biliary colic, cholecystitis, diverticulitis, PUD, renal Colic, UTI and mets. ED Course:   As scheduled case management to see patient, he was seen in the advised patient is hospice was coming to see and evaluate patient. Patient seen by Bhupinder Kaye who is with [facility],  she was able to arrange hospice with Dr. Kira Tirado to continue as primary care doctor and patient to be seen by hospice tomorrow. Patient and her daughter were agreeable with this plan.        Provider Notes (Medical Decision Making): Procedures   Medical Decision Makingedical Decision Making      Disposition   Disposition: Condition stable and improved  DC- Adult Discharges: All of the diagnostic tests were reviewed and questions answered. Diagnosis, care plan and treatment options were discussed. The patient understands the instructions and will follow up as directed. The patients results have been reviewed with them. They have been counseled regarding their diagnosis. The patient and family member patient and daughter verbally convey understanding and agreement of the signs, symptoms, diagnosis, treatment and prognosis and additionally agrees to follow up as recommended with their PCP in 24 - 48 hours. They also agree with the care-plan and convey that all of their questions have been answered. I have also put together some discharge instructions for them that include: 1) educational information regarding their diagnosis, 2) how to care for their diagnosis at home, as well a 3) list of reasons why they would want to return to the ED prior to their follow-up appointment, should their condition change. DC- Pain Control DC Home plan: Analgesics and Hospice    Diagnosis:   1. Terminal illness    2.  Metastatic malignant neoplasm, unspecified site [facility])          Disposition:     Follow-up Information     Follow up With Specialties Details Why Contact Info    Guero Gomes MD Internal Medicine In 1 day  [address] [place] [address] Liaison [phone]          Discharge Medication List as of 11/15/2021  2:09 PM      START taking these medications    Details   ondansetron (Zofran ODT) 4 mg disintegrating tablet 1 Tablet by SubLINGual route every eight (8) hours as needed for Nausea or Vomiting., Normal, Disp-20 Tablet, R-0      oxyCODONE IR (Roxicodone) 5 mg immediate release tablet Take 1 Tablet by mouth every six (6) hours as needed for Pain for up to 3 days. Max Daily Amount: 20 mg., Normal, Disp-12 Tablet, R-0         CONTINUE these medications which have NOT CHANGED    Details   ciprofloxacin HCl (Cipro) 500 mg tablet Take 1 Tablet by mouth two (2) times a day for 7 days. , Normal, Disp-14 Tablet, R-0      metroNIDAZOLE (FlagyL) 500 mg tablet Take 1 Tablet by mouth three (3) times daily for 10 days. , Normal, Disp-30 Tablet, R-0      HYDROcodone-acetaminophen (Norco) 5-325 mg per tablet Take 1 Tablet by mouth every six (6) hours as needed for Pain for up to 3 days. Max Daily Amount: 4 Tablets., Normal, Disp-12 Tablet, R-0      amLODIPine (NORVASC) 5 mg tablet Take 1 Tablet by mouth daily. , Historical Med      pantoprazole (PROTONIX) 40 mg tablet Take 1 Tablet by mouth daily. , Historical Med      promethazine (PHENERGAN) 12.5 mg tablet Take 1 Tablet by mouth every six (6) hours as needed for Nausea., Normal, Disp-60 Tablet, R-1      furosemide (LASIX) 40 mg tablet Take 1 Tablet by mouth daily. , Normal, Disp-30 Tablet, R-0      potassium chloride SR (K-TAB) 20 mEq tablet Take 1 Tablet by mouth daily. , Normal, Disp-30 Tablet, R-0      famotidine (PEPCID) 20 mg tablet Take 1 Tablet by mouth two (2) times a day., Normal, Disp-60 Tablet, R-2      dicyclomine (BENTYL) 10 mg capsule Take 10 mg by mouth every six (6) hours as needed for Abdominal Cramps., Historical Med      tamsulosin (Flomax) 0.4 mg capsule Take 0.4 mg by mouth daily. , Historical Med      albuterol (PROVENTIL HFA, VENTOLIN HFA, PROAIR HFA) 90 mcg/actuation inhaler Take 2 Puffs by inhalation every six (6) hours as needed for Wheezing.  Indications: chronic obstructive pulmonary disease, Normal, Disp-1 Inhaler, R-3      lidocaine (LIDODERM) 5 % Apply patch to the affected area for 12 hours a day and remove for 12 hours a day., Normal, Disp-30 Each, R-2      Linzess 72 mcg cap capsule Take 1 capsule by mouth once daily for 90 days, Normal, Disp-90 Cap, R-0      arformoteroL (BROVANA) 15 mcg/2 mL nebu neb solution 15 mcg by Nebulization route., Historical Med      cetirizine (ZYRTEC) 5 mg tablet Take 5 mg by mouth daily. , Historical Med      fluticasone propionate (FLONASE ALLERGY RELIEF NA) 1 [place] by Nasal route., Historical Med      levocetirizine (XYZAL) 5 mg tablet Take 5 mg by mouth daily. , Historical Med      Oxygen 2 Liters at night, Historical Med      methocarbamoL (ROBAXIN) 500 mg tablet Take 1 Tab by mouth four (4) times daily. , Normal, Disp-360 Tab, R-3      apixaban (ELIQUIS) 2.5 mg tablet Take 2.5 mg by mouth two (2) times a day., Historical Med      simvastatin (ZOCOR) 20 mg tablet Take 20 mg by mouth nightly., Historical Med      cyanocobalamin (VITAMIN B12) 500 mcg tablet Take 500 mcg by mouth daily. , Historical Med      Omega-3 Fatty Acids 60- mg cpDR Take  by mouth., Historical Med      multivitamin (ONE A DAY) tablet Take 1 Tab by mouth daily. , Historical Med      carvediloL (COREG) 3.125 mg tablet Take 3.125 mg by mouth two (2) times daily (with meals). , Historical Med             DISCHARGE PLAN:  1. Current Discharge Medication List      CONTINUE these medications which have NOT CHANGED    Details   amLODIPine (NORVASC) 5 mg tablet Take 1 Tablet by mouth daily. pantoprazole (PROTONIX) 40 mg tablet Take 1 Tablet by mouth daily. ciprofloxacin HCl (Cipro) 500 mg tablet Take 1 Tablet by mouth two (2) times a day for 7 days. Qty: 14 Tablet, Refills: 0      metroNIDAZOLE (FlagyL) 500 mg tablet Take 1 Tablet by mouth three (3) times daily for 10 days. Qty: 30 Tablet, Refills: 0      HYDROcodone-acetaminophen (Norco) 5-325 mg per tablet Take 1 Tablet by mouth every six (6) hours as needed for Pain for up to 3 days. Max Daily Amount: 4 Tablets. Qty: 12 Tablet, Refills: 0    Associated Diagnoses: Generalized abdominal pain; Diverticular disease      promethazine (PHENERGAN) 12.5 mg tablet Take 1 Tablet by mouth every six (6) hours as needed for Nausea.   Qty: 60 Tablet, Refills: 1    Associated Diagnoses: Nausea      furosemide (LASIX) 40 mg tablet Take 1 Tablet by mouth daily. Qty: 30 Tablet, Refills: 0      potassium chloride SR (K-TAB) 20 mEq tablet Take 1 Tablet by mouth daily. Qty: 30 Tablet, Refills: 0      famotidine (PEPCID) 20 mg tablet Take 1 Tablet by mouth two (2) times a day. Qty: 60 Tablet, Refills: 2    Associated Diagnoses: Gastroesophageal reflux disease without esophagitis      dicyclomine (BENTYL) 10 mg capsule Take 10 mg by mouth every six (6) hours as needed for Abdominal Cramps. tamsulosin (Flomax) 0.4 mg capsule Take 0.4 mg by mouth daily. albuterol (PROVENTIL HFA, VENTOLIN HFA, PROAIR HFA) 90 mcg/actuation inhaler Take 2 Puffs by inhalation every six (6) hours as needed for Wheezing. Indications: chronic obstructive pulmonary disease  Qty: 1 Inhaler, Refills: 3    Associated Diagnoses: Chronic obstructive pulmonary disease, unspecified COPD type (HCC)      lidocaine (LIDODERM) 5 % Apply patch to the affected area for 12 hours a day and remove for 12 hours a day. Qty: 30 Each, Refills: 2    Associated Diagnoses: Trapezius muscle spasm      Linzess 72 mcg cap capsule Take 1 capsule by mouth once daily for 90 days  Qty: 90 Cap, Refills: 0      arformoteroL (BROVANA) 15 mcg/2 mL nebu neb solution 15 mcg by Nebulization route. cetirizine (ZYRTEC) 5 mg tablet Take 5 mg by mouth daily. fluticasone propionate (FLONASE ALLERGY RELIEF NA) 1 [place] by Nasal route. levocetirizine (XYZAL) 5 mg tablet Take 5 mg by mouth daily. Oxygen 2 Liters at night      methocarbamoL (ROBAXIN) 500 mg tablet Take 1 Tab by mouth four (4) times daily. Qty: 360 Tab, Refills: 3    Associated Diagnoses: Night muscle spasms      apixaban (ELIQUIS) 2.5 mg tablet Take 2.5 mg by mouth two (2) times a day. simvastatin (ZOCOR) 20 mg tablet Take 20 mg by mouth nightly. cyanocobalamin (VITAMIN B12) 500 mcg tablet Take 500 mcg by mouth daily. Omega-3 Fatty Acids 60- mg cpDR Take  by mouth.      multivitamin (ONE A DAY) tablet Take 1 Tab by mouth daily. carvediloL (COREG) 3.125 mg tablet Take 3.125 mg by mouth two (2) times daily (with meals). 2.   Follow-up Information     Follow up With Specialties Details Why Contact Info    Guero Gomes MD Internal Medicine In 1 day  [address] [address]  [address] Liaison [phone]        3. Return to ED if worse   4. Discharge Medication List as of 11/15/2021  2:09 PM      START taking these medications    Details   ondansetron (Zofran ODT) 4 mg disintegrating tablet 1 Tablet by SubLINGual route every eight (8) hours as needed for Nausea or Vomiting., Normal, Disp-20 Tablet, R-0      oxyCODONE IR (Roxicodone) 5 mg immediate release tablet Take 1 Tablet by mouth every six (6) hours as needed for Pain for up to 3 days. Max Daily Amount: 20 mg., Normal, Disp-12 Tablet, R-0         CONTINUE these medications which have NOT CHANGED    Details   ciprofloxacin HCl (Cipro) 500 mg tablet Take 1 Tablet by mouth two (2) times a day for 7 days. , Normal, Disp-14 Tablet, R-0      metroNIDAZOLE (FlagyL) 500 mg tablet Take 1 Tablet by mouth three (3) times daily for 10 days. , Normal, Disp-30 Tablet, R-0      HYDROcodone-acetaminophen (Norco) 5-325 mg per tablet Take 1 Tablet by mouth every six (6) hours as needed for Pain for up to 3 days. Max Daily Amount: 4 Tablets., Normal, Disp-12 Tablet, R-0      amLODIPine (NORVASC) 5 mg tablet Take 1 Tablet by mouth daily. , Historical Med      pantoprazole (PROTONIX) 40 mg tablet Take 1 Tablet by mouth daily. , Historical Med      promethazine (PHENERGAN) 12.5 mg tablet Take 1 Tablet by mouth every six (6) hours as needed for Nausea., Normal, Disp-60 Tablet, R-1      furosemide (LASIX) 40 mg tablet Take 1 Tablet by mouth daily. , Normal, Disp-30 Tablet, R-0 potassium chloride SR (K-TAB) 20 mEq tablet Take 1 Tablet by mouth daily. , Normal, Disp-30 Tablet, R-0      famotidine (PEPCID) 20 mg tablet Take 1 Tablet by mouth two (2) times a day., Normal, Disp-60 Tablet, R-2      dicyclomine (BENTYL) 10 mg capsule Take 10 mg by mouth every six (6) hours as needed for Abdominal Cramps., Historical Med      tamsulosin (Flomax) 0.4 mg capsule Take 0.4 mg by mouth daily. , Historical Med      albuterol (PROVENTIL HFA, VENTOLIN HFA, PROAIR HFA) 90 mcg/actuation inhaler Take 2 Puffs by inhalation every six (6) hours as needed for Wheezing. Indications: chronic obstructive pulmonary disease, Normal, Disp-1 Inhaler, R-3      lidocaine (LIDODERM) 5 % Apply patch to the affected area for 12 hours a day and remove for 12 hours a day., Normal, Disp-30 Each, R-2      Linzess 72 mcg cap capsule Take 1 capsule by mouth once daily for 90 days, Normal, Disp-90 Cap, R-0      arformoteroL (BROVANA) 15 mcg/2 mL nebu neb solution 15 mcg by Nebulization route., Historical Med      cetirizine (ZYRTEC) 5 mg tablet Take 5 mg by mouth daily. , Historical Med      fluticasone propionate (FLONASE ALLERGY RELIEF NA) 1 [place] by Nasal route., Historical Med      levocetirizine (XYZAL) 5 mg tablet Take 5 mg by mouth daily. , Historical Med      Oxygen 2 Liters at night, Historical Med      methocarbamoL (ROBAXIN) 500 mg tablet Take 1 Tab by mouth four (4) times daily. , Normal, Disp-360 Tab, R-3      apixaban (ELIQUIS) 2.5 mg tablet Take 2.5 mg by mouth two (2) times a day., Historical Med      simvastatin (ZOCOR) 20 mg tablet Take 20 mg by mouth nightly., Historical Med      cyanocobalamin (VITAMIN B12) 500 mcg tablet Take 500 mcg by mouth daily. , Historical Med      Omega-3 Fatty Acids 60- mg cpDR Take  by mouth., Historical Med      multivitamin (ONE A DAY) tablet Take 1 Tab by mouth daily. , Historical Med      carvediloL (COREG) 3.125 mg tablet Take 3.125 mg by mouth two (2) times daily (with meals). , Historical Med               Diagnosis     Clinical Impression:   1. Terminal illness    2. Metastatic malignant neoplasm, unspecified site [facility])        Attestations:    Ifeoma Fontenot MD    Please note that this dictation was completed with [employer], the computer voice recognition software. Quite often unanticipated grammatical, syntax, homophones, and other interpretive errors are inadvertently transcribed by the computer software. Please disregard these errors. Please excuse any errors that have escaped final proofreading. Thank you.

## 2021-11-15 NOTE — PROGRESS NOTES
Spoke with patient on 11/12 in the ER. She had chosen [facility]. They were to see her today and she was aware of them coming. Spoke with Radha Durbin [phone] with [facility]. She will be coming to the ER to speak with patient and her daughter. Nurse made aware.

## 2021-11-15 NOTE — PROGRESS NOTES
Bhupinder Mensah with [facility] states they will open patient up tomorrow with [facility].

## 2021-11-19 LAB
BACTERIA SPEC CULT: NORMAL
SPECIAL REQUESTS,SREQ: NORMAL

## 2021-12-03 ENCOUNTER — PATIENT OUTREACH (OUTPATIENT)
Dept: CASE MANAGEMENT | Age: 86
End: 2021-12-03

## 2021-12-03 NOTE — PROGRESS NOTES
CTN attempt to contact Patient on phone. CTN reached a female voice and states that Patient is unable to talk at this time. The female voice kept the conversation short and ended the call. The call is to verify if Hospice is following Patient.

## 2022-02-10 ENCOUNTER — HOSPITAL ENCOUNTER (EMERGENCY)
Age: 87
Discharge: HOME | End: 2022-02-10
Attending: FAMILY MEDICINE
Payer: OTHER MISCELLANEOUS

## 2022-02-10 ENCOUNTER — APPOINTMENT (OUTPATIENT)
Dept: CT IMAGING | Age: 87
End: 2022-02-10
Attending: FAMILY MEDICINE
Payer: OTHER MISCELLANEOUS

## 2022-02-10 VITALS
TEMPERATURE: 98.2 F | WEIGHT: 118 LBS | HEIGHT: 63 IN | DIASTOLIC BLOOD PRESSURE: 72 MMHG | HEART RATE: 80 BPM | RESPIRATION RATE: 18 BRPM | SYSTOLIC BLOOD PRESSURE: 132 MMHG | OXYGEN SATURATION: 99 % | BODY MASS INDEX: 20.91 KG/M2

## 2022-02-10 DIAGNOSIS — R10.84 ABDOMINAL PAIN, GENERALIZED: Primary | ICD-10-CM

## 2022-02-10 DIAGNOSIS — N30.01 ACUTE CYSTITIS WITH HEMATURIA: ICD-10-CM

## 2022-02-10 DIAGNOSIS — K57.92 DIVERTICULITIS: ICD-10-CM

## 2022-02-10 DIAGNOSIS — K59.00 CONSTIPATION, UNSPECIFIED CONSTIPATION TYPE: ICD-10-CM

## 2022-02-10 LAB
ALBUMIN SERPL-MCNC: 2.4 G/DL (ref 3.5–4.7)
ALBUMIN/GLOB SERPL: 0.6 {RATIO}
ALP SERPL-CCNC: 82 U/L (ref 38–126)
ALT SERPL-CCNC: 8 U/L (ref 3–52)
ANION GAP SERPL CALC-SCNC: 9 MMOL/L
APPEARANCE UR: CLEAR
AST SERPL W P-5'-P-CCNC: 14 U/L (ref 14–74)
BACTERIA URNS QL MICRO: ABNORMAL /HPF
BASOPHILS # BLD: 0 K/UL (ref 0–0.1)
BASOPHILS NFR BLD: 0 % (ref 0–2)
BILIRUB SERPL-MCNC: 0.3 MG/DL (ref 0.2–1)
BILIRUB UR QL: NEGATIVE
BUN SERPL-MCNC: 7 MG/DL (ref 9–21)
BUN/CREAT SERPL: 18
CA-I BLD-MCNC: 9.1 MG/DL (ref 8.5–10.5)
CHLORIDE SERPL-SCNC: 103 MMOL/L (ref 94–111)
CO2 SERPL-SCNC: 26 MMOL/L (ref 21–33)
COLOR UR: YELLOW
CREAT SERPL-MCNC: 0.4 MG/DL (ref 0.7–1.2)
DIFFERENTIAL METHOD BLD: ABNORMAL
EOSINOPHIL # BLD: 0.2 K/UL (ref 0–0.4)
EOSINOPHIL NFR BLD: 2 % (ref 0–5)
EPITH CASTS URNS QL MICRO: ABNORMAL /LPF (ref 0–20)
ERYTHROCYTE [DISTWIDTH] IN BLOOD BY AUTOMATED COUNT: 15.5 % (ref 11.6–14.5)
GLOBULIN SER CALC-MCNC: 4.2 G/DL
GLUCOSE SERPL-MCNC: 97 MG/DL (ref 70–110)
GLUCOSE UR STRIP.AUTO-MCNC: NEGATIVE MG/DL
HCT VFR BLD AUTO: 37.5 % (ref 35–45)
HGB BLD-MCNC: 11.1 G/DL (ref 12–16)
HGB UR QL STRIP: NEGATIVE
IMM GRANULOCYTES # BLD AUTO: 0 K/UL (ref 0–0.04)
IMM GRANULOCYTES NFR BLD AUTO: 0 % (ref 0–0.5)
KETONES UR QL STRIP.AUTO: NEGATIVE MG/DL
LEUKOCYTE ESTERASE UR QL STRIP.AUTO: ABNORMAL
LIPASE SERPL-CCNC: 18 U/L (ref 10–57)
LYMPHOCYTES # BLD: 2.6 K/UL (ref 0.9–3.6)
LYMPHOCYTES NFR BLD: 27 % (ref 21–52)
MAGNESIUM SERPL-MCNC: 1.8 MG/DL (ref 1.7–2.8)
MCH RBC QN AUTO: 28.2 PG (ref 24–34)
MCHC RBC AUTO-ENTMCNC: 29.6 G/DL (ref 31–37)
MCV RBC AUTO: 95.4 FL (ref 78–100)
MONOCYTES # BLD: 0.5 K/UL (ref 0.05–1.2)
MONOCYTES NFR BLD: 5 % (ref 3–10)
MUCOUS THREADS URNS QL MICRO: ABNORMAL /LPF
NEUTS SEG # BLD: 6.4 K/UL (ref 1.8–8)
NEUTS SEG NFR BLD: 66 % (ref 40–73)
NITRITE UR QL STRIP.AUTO: NEGATIVE
NRBC # BLD: 0 K/UL (ref 0–0.01)
NRBC BLD-RTO: 0 PER 100 WBC
PH UR STRIP: 6.5 [PH] (ref 5–8)
PLATELET # BLD AUTO: 472 K/UL (ref 135–420)
PMV BLD AUTO: 9 FL (ref 9.2–11.8)
POTASSIUM SERPL-SCNC: 3.8 MMOL/L (ref 3.2–5.1)
PROT SERPL-MCNC: 6.6 G/DL (ref 6.1–8.4)
PROT UR STRIP-MCNC: NEGATIVE MG/DL
RBC # BLD AUTO: 3.93 M/UL (ref 4.2–5.3)
RBC #/AREA URNS HPF: ABNORMAL /HPF (ref 0–2)
SODIUM SERPL-SCNC: 138 MMOL/L (ref 135–145)
SP GR UR REFRACTOMETRY: 1.02 (ref 1–1.03)
UROBILINOGEN UR QL STRIP.AUTO: 0.2 EU/DL (ref 0.2–1)
WBC # BLD AUTO: 9.8 K/UL (ref 4.6–13.2)
WBC URNS QL MICRO: ABNORMAL /HPF (ref 0–4)
YEAST URNS QL MICRO: PRESENT

## 2022-02-10 PROCEDURE — 85025 COMPLETE CBC W/AUTO DIFF WBC: CPT

## 2022-02-10 PROCEDURE — 83690 ASSAY OF LIPASE: CPT

## 2022-02-10 PROCEDURE — 74176 CT ABD & PELVIS W/O CONTRAST: CPT

## 2022-02-10 PROCEDURE — 36415 COLL VENOUS BLD VENIPUNCTURE: CPT

## 2022-02-10 PROCEDURE — 99285 EMERGENCY DEPT VISIT HI MDM: CPT

## 2022-02-10 PROCEDURE — 96374 THER/PROPH/DIAG INJ IV PUSH: CPT

## 2022-02-10 PROCEDURE — 74011250637 HC RX REV CODE- 250/637: Performed by: FAMILY MEDICINE

## 2022-02-10 PROCEDURE — 81001 URINALYSIS AUTO W/SCOPE: CPT

## 2022-02-10 PROCEDURE — 74011250636 HC RX REV CODE- 250/636: Performed by: FAMILY MEDICINE

## 2022-02-10 PROCEDURE — 96375 TX/PRO/DX INJ NEW DRUG ADDON: CPT

## 2022-02-10 PROCEDURE — 80053 COMPREHEN METABOLIC PANEL: CPT

## 2022-02-10 PROCEDURE — 83735 ASSAY OF MAGNESIUM: CPT

## 2022-02-10 RX ORDER — LORAZEPAM 0.5 MG/1
TABLET ORAL
COMMUNITY
Start: 2021-12-04

## 2022-02-10 RX ORDER — FLUCONAZOLE 100 MG/1
200 TABLET ORAL
Status: COMPLETED | OUTPATIENT
Start: 2022-02-10 | End: 2022-02-10

## 2022-02-10 RX ORDER — AMOXICILLIN AND CLAVULANATE POTASSIUM 875; 125 MG/1; MG/1
1 TABLET, FILM COATED ORAL 2 TIMES DAILY
Qty: 20 TABLET | Refills: 0 | Status: SHIPPED | OUTPATIENT
Start: 2022-02-10 | End: 2022-02-20

## 2022-02-10 RX ORDER — FENTANYL 12.5 UG/1
PATCH TRANSDERMAL
COMMUNITY
Start: 2021-12-03

## 2022-02-10 RX ORDER — OXYCODONE HYDROCHLORIDE 10 MG/1
TABLET ORAL
COMMUNITY
Start: 2021-12-03

## 2022-02-10 RX ORDER — MORPHINE SULFATE 20 MG/ML
SOLUTION ORAL
COMMUNITY
Start: 2021-11-19

## 2022-02-10 RX ORDER — KETOROLAC TROMETHAMINE 30 MG/ML
15 INJECTION, SOLUTION INTRAMUSCULAR; INTRAVENOUS
Status: COMPLETED | OUTPATIENT
Start: 2022-02-10 | End: 2022-02-10

## 2022-02-10 RX ORDER — LACTULOSE 10 G/15ML
SOLUTION ORAL; RECTAL
COMMUNITY
Start: 2021-12-03

## 2022-02-10 RX ORDER — FACIAL-BODY WIPES
10 EACH TOPICAL
Status: COMPLETED | OUTPATIENT
Start: 2022-02-10 | End: 2022-02-10

## 2022-02-10 RX ORDER — METOCLOPRAMIDE HYDROCHLORIDE 5 MG/ML
5 INJECTION INTRAMUSCULAR; INTRAVENOUS
Status: COMPLETED | OUTPATIENT
Start: 2022-02-10 | End: 2022-02-10

## 2022-02-10 RX ORDER — AMOXICILLIN AND CLAVULANATE POTASSIUM 875; 125 MG/1; MG/1
1 TABLET, FILM COATED ORAL
Status: COMPLETED | OUTPATIENT
Start: 2022-02-10 | End: 2022-02-10

## 2022-02-10 RX ORDER — ACETAMINOPHEN 650 MG/1
SUPPOSITORY RECTAL
COMMUNITY
Start: 2021-12-03

## 2022-02-10 RX ORDER — STANDARDIZED SENNA CONCENTRATE 8.6 MG/1
TABLET ORAL
COMMUNITY
Start: 2021-12-04

## 2022-02-10 RX ORDER — HYOSCYAMINE SULFATE 0.12 MG/1
TABLET SUBLINGUAL
COMMUNITY
Start: 2021-12-04

## 2022-02-10 RX ADMIN — BISACODYL 10 MG: 10 SUPPOSITORY RECTAL at 20:53

## 2022-02-10 RX ADMIN — AMOXICILLIN AND CLAVULANATE POTASSIUM 1 TABLET: 875; 125 TABLET, FILM COATED ORAL at 20:49

## 2022-02-10 RX ADMIN — METOCLOPRAMIDE HYDROCHLORIDE 5 MG: 5 INJECTION INTRAMUSCULAR; INTRAVENOUS at 19:20

## 2022-02-10 RX ADMIN — FLUCONAZOLE 200 MG: 100 TABLET ORAL at 20:49

## 2022-02-10 RX ADMIN — SODIUM CHLORIDE 1000 ML: 9 INJECTION, SOLUTION INTRAVENOUS at 19:20

## 2022-02-10 RX ADMIN — KETOROLAC TROMETHAMINE 15 MG: 30 INJECTION, SOLUTION INTRAMUSCULAR at 19:19

## 2022-02-11 NOTE — ED NOTES
I have reviewed discharge instructions with the patient and caregiver. The patient and caregiver verbalized understanding.       Reviewed medication compliance, follow up with PCP, return to ER for any new or worrisome concerns

## 2022-02-11 NOTE — DISCHARGE INSTRUCTIONS
Thank you for allowing us to provide you with excellent care today. We hope we addressed all of your concerns and needs. We strive to provide excellent quality care in the Emergency Department. Anything less than excellent does not meet our expectations for you. If you feel that you have not received excellent quality care or timely care, please ask to speak to the nurse manager. Please choose us in the future for your continued health care needs. The exam and treatment you received in the Emergency Department were for an urgent problem and are not intended as complete care. It is important that you follow-up with a doctor, nurse practitioner, or physician assistant to:  (1) confirm your diagnosis,  (2) re-evaluation of changes in your illness and treatment, and  (3) for ongoing care. If your symptoms become worse or you do not improve as expected and you are unable to reach your usual health care provider, you should return to the Emergency Department. We are available 24 hours a day. Take this sheet with you when you go to your follow-up visit. If you have any problem arranging the follow-up visit, contact [phone] ([phone])    Make an appointment with your Primary Care doctor for follow up of this visit. Return to the ER if you are unable to be seen in the time recommended on your discharge instructions.

## 2022-02-11 NOTE — ED NOTES
States feels better at present. Dr Stephany Cotton at bedside discussing findings okay for discharge.  Jai Melissa contacted for transfer

## 2022-02-11 NOTE — ED PROVIDER NOTES
EMERGENCY DEPARTMENT HISTORY AND PHYSICAL EXAM      Date: 2/10/2022  Patient Name: Keren Meadows    History of Presenting Illness     Chief Complaint   Patient presents with    Abdominal Pain       History Provided By: Patient, EMS and chart review/med records    HPI: Keren Meadows, 80 y.o. female with a past medical history significant hyperlipidemia, osteoarthritis, malignancy, asthma and CHF, failure to thrive, chronic abdominal pain with past episodes of C. difficile colitis, diverticulitis, hernia surgery presents to the ED via EMS with cc of abdominal pain x 2 days. 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 She is currently on hospice with chronic pain regimen including fentanyl patch, morphine, and oxycodone presents to the ER via EMS for 2-day history of generalized abdominal pain. Patient states the pain is 10 out of 10. She states she has not had a bowel movement since yesterday and it was small and hard. She denies chest pain, shortness of breath, headache, dizziness, numbness, tingling, falls, dysuria, hematuria, vomiting, diarrhea, fevers, chills, changes in diet, or recent travel. There are no other complaints, changes, or physical findings at this time. PCP: Joshua Otto MD    No current facility-administered medications on file prior to encounter.      Current Outpatient Medications on File Prior to Encounter   Medication Sig Dispense Refill    acetaminophen (TYLENOL) 650 mg suppository       fentaNYL (DURAGESIC) 12 mcg/hr patch       hyoscyamine SL (LEVSIN/SL) 0.125 mg SL tablet       lactulose (CHRONULAC) 10 gram/15 mL solution       LORazepam (ATIVAN) 0.5 mg tablet       morphine (ROXANOL) 100 mg/5 mL (20 mg/mL) concentrated solution       oxyCODONE IR (ROXICODONE) 10 mg tab immediate release tablet       Senokot 8.6 mg tablet       ondansetron (Zofran ODT) 4 mg disintegrating tablet 1 Tablet by SubLINGual route every eight (8) hours as needed for Nausea or Vomiting. 20 Tablet 0    amLODIPine (NORVASC) 5 mg tablet Take 1 Tablet by mouth daily.  promethazine (PHENERGAN) 12.5 mg tablet Take 1 Tablet by mouth every six (6) hours as needed for Nausea. 60 Tablet 1    furosemide (LASIX) 40 mg tablet Take 1 Tablet by mouth daily. 30 Tablet 0    dicyclomine (BENTYL) 10 mg capsule Take 10 mg by mouth every six (6) hours as needed for Abdominal Cramps.  albuterol (PROVENTIL HFA, VENTOLIN HFA, PROAIR HFA) 90 mcg/actuation inhaler Take 2 Puffs by inhalation every six (6) hours as needed for Wheezing. Indications: chronic obstructive pulmonary disease 1 Inhaler 3    Linzess 72 mcg cap capsule Take 1 capsule by mouth once daily for 90 days 90 Cap 0    arformoteroL (BROVANA) 15 mcg/2 mL nebu neb solution 15 mcg by Nebulization route.  fluticasone propionate (FLONASE ALLERGY RELIEF NA) 1 [place] by Nasal route.  Oxygen 2 Liters at night      methocarbamoL (ROBAXIN) 500 mg tablet Take 1 Tab by mouth four (4) times daily. 360 Tab 3    apixaban (ELIQUIS) 2.5 mg tablet Take 2.5 mg by mouth two (2) times a day.  multivitamin (ONE A DAY) tablet Take 1 Tab by mouth daily.  carvediloL (COREG) 3.125 mg tablet Take 3.125 mg by mouth two (2) times daily (with meals).  pantoprazole (PROTONIX) 40 mg tablet Take 1 Tablet by mouth daily. (Patient not taking: Reported on 2/10/2022)      potassium chloride SR (K-TAB) 20 mEq tablet Take 1 Tablet by mouth daily. (Patient not taking: Reported on 2/10/2022) 30 Tablet 0    famotidine (PEPCID) 20 mg tablet Take 1 Tablet by mouth two (2) times a day. (Patient not taking: Reported on 2/10/2022) 60 Tablet 2    tamsulosin (Flomax) 0.4 mg capsule Take 0.4 mg by mouth daily. (Patient not taking: Reported on 2/10/2022)      lidocaine (LIDODERM) 5 % Apply patch to the affected area for 12 hours a day and remove for 12 hours a day.  (Patient not taking: Reported on 2/10/2022) 30 Each 2    cetirizine (ZYRTEC) 5 mg tablet Take 5 mg by mouth daily. (Patient not taking: Reported on 2/10/2022)      levocetirizine (XYZAL) 5 mg tablet Take 5 mg by mouth daily. (Patient not taking: Reported on 2/10/2022)      simvastatin (ZOCOR) 20 mg tablet Take 20 mg by mouth nightly. (Patient not taking: Reported on 2/10/2022)      cyanocobalamin (VITAMIN B12) 500 mcg tablet Take 500 mcg by mouth daily. (Patient not taking: Reported on 2/10/2022)      Omega-3 Fatty Acids 60- mg cpDR Take  by mouth. (Patient not taking: Reported on 2/10/2022)         Past History     Past Medical History:  Past Medical History:   Diagnosis Date    Abdominal pain, chronic, generalized     at belly button area    Arthritis     Asthma     CAD (coronary artery disease)     COPD (chronic obstructive pulmonary disease) (HCC)     Diverticulitis     Heart disease     High cholesterol     Hyperlipidemia        Past Surgical History:  Past Surgical History:   Procedure Laterality Date    HX APPENDECTOMY      HX COLONOSCOPY  01/26/2015    per gi no further colonoscopy needed due to age of the patient, see note 1/26/15    HX HERNIA REPAIR Left 2001    inguinal    HX HYSTERECTOMY      AL CARDIAC SURG PROCEDURE UNLIST  2007    defibrillator placed       Family History:  Family History   Problem Relation Age of Onset    Emphysema Mother        Social History:  Social History     Tobacco Use    Smoking status: Former Smoker    Smokeless tobacco: Never Used   Vaping Use    Vaping Use: Never used   Substance Use Topics    Alcohol use: Not Currently    Drug use: Never       Allergies: Allergies   Allergen Reactions    Codeine Nausea and Vomiting         Review of Systems     Review of Systems   Constitutional: Negative for activity change, appetite change, diaphoresis and fever. HENT: Negative for congestion, rhinorrhea, sore throat, tinnitus and trouble swallowing.     Eyes: Negative for photophobia, pain, redness and visual disturbance. Respiratory: Negative for apnea, cough, choking, chest tightness, shortness of breath, wheezing and stridor. Cardiovascular: Negative for chest pain, palpitations and leg swelling. Gastrointestinal: Positive for abdominal pain and constipation. Negative for anal bleeding, blood in stool, diarrhea, nausea, rectal pain and vomiting. Endocrine: Negative for cold intolerance, heat intolerance, polydipsia and polyphagia. Genitourinary: Negative for difficulty urinating, dysuria, flank pain, hematuria and menstrual problem. Musculoskeletal: Positive for arthralgias and myalgias. Negative for neck pain and neck stiffness. Chronic myalgias/arthralgias for which she takes chronic opioid pain medications   Skin: Negative for rash and wound. Allergic/Immunologic: Negative for food allergies. Neurological: Negative for dizziness, tremors, seizures, syncope, facial asymmetry, numbness and headaches. Hematological: Negative for adenopathy. Psychiatric/Behavioral: Negative for agitation, behavioral problems, dysphoric mood, hallucinations and suicidal ideas. All other systems reviewed and are negative. Physical Exam     Physical Exam  Vitals and nursing note reviewed. Constitutional:       General: She is not in acute distress. Appearance: She is not toxic-appearing or diaphoretic. Comments: Thin, chronically-ill appearing; pleasant   HENT:      Head: Normocephalic and atraumatic. Mouth/Throat:      Pharynx: Oropharynx is clear. No pharyngeal swelling or oropharyngeal exudate. Eyes:      Extraocular Movements: Extraocular movements intact. Pupils: Pupils are equal, round, and reactive to light. Cardiovascular:      Rate and Rhythm: Normal rate and regular rhythm. Heart sounds: Normal heart sounds. No friction rub. No gallop. Pulmonary:      Effort: Pulmonary effort is normal. No respiratory distress. Breath sounds: Normal breath sounds.  No wheezing. Chest:      Chest wall: No tenderness. Abdominal:      General: Abdomen is flat. Bowel sounds are decreased. There are no signs of injury. Palpations: There is no shifting dullness, fluid wave or mass. Tenderness: There is generalized abdominal tenderness. There is no right CVA tenderness, left CVA tenderness, guarding or rebound. Negative signs include Mcbride's sign, Rovsing's sign and McBurney's sign. Hernia: No hernia is present. Comments: Mild distension in lower abdomen with tenderness to palpation throughout. No rebound tenderness or guarding. Bowel sounds present, somewhat decreased. Skin:     General: Skin is warm and dry. Capillary Refill: Capillary refill takes 2 to 3 seconds. Coloration: Skin is not cyanotic or jaundiced. Findings: No rash. Neurological:      General: No focal deficit present. Mental Status: She is alert and oriented to person, place, and time. Cranial Nerves: No cranial nerve deficit. Psychiatric:         Mood and Affect: Mood normal. Mood is not anxious or depressed.          Behavior: Behavior normal.         Lab and Diagnostic Study Results     Labs -     Recent Results (from the past 12 hour(s))   URINALYSIS W/ RFLX MICROSCOPIC    Collection Time: 02/10/22  7:30 PM   Result Value Ref Range    Color Yellow      Appearance Clear      Specific gravity 1.018 1.005 - 1.030      pH (UA) 6.5 5.0 - 8.0      Protein Negative Negative mg/dL    Glucose Negative Negative mg/dL    Ketone Negative Negative mg/dL    Bilirubin Negative Negative      Blood Negative Negative      Urobilinogen 0.2 0.2 - 1.0 EU/dL    Nitrites Negative Negative      Leukocyte Esterase Small (A) Negative     URINE MICROSCOPIC    Collection Time: 02/10/22  7:30 PM   Result Value Ref Range    WBC 5-10 0 - 4 /hpf    RBC 5-10 0 - 2 /hpf    Epithelial cells Few 0 - 20 /lpf    Bacteria 1+ (A) None /hpf    Mucus 1+ /lpf    Yeast Present     METABOLIC PANEL, COMPREHENSIVE    Collection Time: 02/10/22  7:43 PM   Result Value Ref Range    Sodium 138 135 - 145 mmol/L    Potassium 3.8 3.2 - 5.1 mmol/L    Chloride 103 94 - 111 mmol/L    CO2 26 21 - 33 mmol/L    Anion gap 9 mmol/L    Glucose 97 70 - 110 mg/dL    BUN 7 (L) 9 - 21 mg/dL    Creatinine 0.40 (L) 0.70 - 1.20 mg/dL    BUN/Creatinine ratio 18      GFR est AA >60 ml/min/1.73m2    GFR est non-AA >60 ml/min/1.73m2    Calcium 9.1 8.5 - 10.5 mg/dL    Bilirubin, total 0.3 0.2 - 1.0 mg/dL    AST (SGOT) 14 14 - 74 U/L    ALT (SGPT) 8 3 - 52 U/L    Alk. phosphatase 82 38 - 126 U/L    Protein, total 6.6 6.1 - 8.4 g/dL    Albumin 2.4 (L) 3.5 - 4.7 g/dL    Globulin 4.2 g/dL    A-G Ratio 0.6     MAGNESIUM    Collection Time: 02/10/22  7:43 PM   Result Value Ref Range    Magnesium 1.8 1.7 - 2.8 mg/dL   CBC WITH AUTOMATED DIFF    Collection Time: 02/10/22  7:43 PM   Result Value Ref Range    WBC 9.8 4.6 - 13.2 K/uL    RBC 3.93 (L) 4.20 - 5.30 M/uL    HGB 11.1 (L) 12.0 - 16.0 g/dL    HCT 37.5 35.0 - 45.0 %    MCV 95.4 78.0 - 100.0 FL    MCH 28.2 24.0 - 34.0 PG    MCHC 29.6 (L) 31.0 - 37.0 g/dL    RDW 15.5 (H) 11.6 - 14.5 %    PLATELET 675 (H) 160 - 420 K/uL    MPV 9.0 (L) 9.2 - 11.8 FL    NRBC 0.0 0.0  WBC    ABSOLUTE NRBC 0.00 0.00 - 0.01 K/uL    NEUTROPHILS 66 40 - 73 %    LYMPHOCYTES 27 21 - 52 %    MONOCYTES 5 3 - 10 %    EOSINOPHILS 2 0 - 5 %    BASOPHILS 0 0 - 2 %    IMMATURE GRANULOCYTES 0 0 - 0.5 %    ABS. NEUTROPHILS 6.4 1.8 - 8.0 K/UL    ABS. LYMPHOCYTES 2.6 0.9 - 3.6 K/UL    ABS. MONOCYTES 0.5 0.05 - 1.2 K/UL    ABS. EOSINOPHILS 0.2 0.0 - 0.4 K/UL    ABS. BASOPHILS 0.0 0.0 - 0.1 K/UL    ABS. IMM.  GRANS. 0.0 0.00 - 0.04 K/UL    DF AUTOMATED     LIPASE    Collection Time: 02/10/22  7:43 PM   Result Value Ref Range    Lipase 18 10 - 57 U/L       Radiologic Studies -   @[email or web address]@  CT Results  (Last 48 hours)               02/10/22 1957  CT ABD PELV WO CONT Final result    Impression:      There is colonic diverticulosis with inflammation about the sigmoid colon   suggesting persistent or recurrent diverticulitis. Advise GI consultation. Consider colonoscopy after appropriate therapy to exclude any underlying colonic   malignancy. No extraluminal gas or abscess. Small amount of free fluid in the   right hemipelvis likely reactive. Mild right hydronephrosis and hydroureter   likely secondary to this inflammatory process. Worsening right lower lobe airspace disease suggesting pneumonia. Small right   effusion. Moderate to severe emphysema. Narrative:  EXAM: CT of the abdomen and pelvis       INDICATION: Abdominal pain, constipation       COMPARISON: November 12, 2021       TECHNIQUE: Axial CT imaging of the abdomen and pelvis was performed without   intravenous contrast. Multiplanar reformats were generated. One or more dose   reduction techniques were used on this CT: automated exposure control,   adjustment of the mAs and/or kVp according to patient size, and iterative   reconstruction techniques. The specific techniques used on this CT exam have   been documented in the patient's electronic medical record. Digital Imaging and   Communications in Medicine (DICOM) format image data are available to   nonaffiliated external healthcare facilities or entities on a secure, media   free, reciprocally searchable basis with patient authorization for at least a   12-month period after this study. _______________       FINDINGS:       LOWER CHEST: There is moderate to severe emphysema. There is right lower lobe   airspace disease suggesting pneumonia. Small right pleural effusion present. LIVER, BILIARY: Liver is normal. No biliary dilation. Gallbladder is   unremarkable.        PANCREAS: Normal.       SPLEEN: Normal.       ADRENALS: Normal.       KIDNEYS: Right kidney: There is mild right hydronephrosis and hydroureter down   to the right hemipelvis where there are inflammatory changes likely causing the   ureteral obstruction. Left kidney: There is a pedunculated cyst off of the lower pole the left kidney   otherwise kidneys demonstrate no hydronephrosis or nephrolithiasis. There is no   hydroureter. VASCULATURE: Severe calcific atherosclerosis present. LYMPH NODES: No enlarged lymph nodes. GASTROINTESTINAL TRACT: There is colonic diverticulosis with pericolonic   inflammation about the sigmoid colon in the right hemipelvis suggesting   persistent or recurrent diverticulitis. There is no extraluminal gas or abscess. This inflammatory process is likely resulting in the mild right hydronephrosis   and hydroureter. Given the fact that there is still inflammation in this region   since November 12, 2021, this raises the concern for possible underlying   malignancy. I would advise GI consultation and consider colonoscopy after   appropriate therapy. There is moderate to large amount of stool in the colon. There is a right   inguinal hernia containing one wall of cecum without colonic obstruction. There   is no bowel obstruction. PELVIC ORGANS: Small amount of free fluid is present in the pelvis. Hysterectomy. Evaluation the urinary bladder is limited due to the beam   hardening artifacts from left hip arthroplasty. There is some high density   material in the urinary bladder suggesting bladder calculi or sludge. BONES: No acute or aggressive osseous abnormalities identified. OTHER: None.       _______________               CXR Results  (Last 48 hours)    None            Medical Decision Making   - I am the first provider for this patient. - I reviewed the vital signs, available nursing notes, past medical history, past surgical history, family history and social history. - Initial assessment performed. The patients presenting problems have been discussed, and they are in agreement with the care plan formulated and outlined with them.   I have encouraged them to ask questions as they arise throughout their visit. Vital Signs-Reviewed the patient's vital signs. Patient Vitals for the past 12 hrs:   Temp Pulse Resp BP SpO2   02/10/22 1912 98.4 °F (36.9 °C) 95 18 (!) 123/92 97 %       Records Reviewed: Nursing Notes, Old Medical Records, Ambulance Run Sheet, Previous Radiology Studies and Previous Laboratory Studies    The patient presents with abdominal pain with a differential diagnosis of abdominal pain, appendicitis, biliary colic, cholecystitis, diverticulitis, gastritis, gastroenteritis, GERD, obstruction, pancreatitis, PUD, renal Colic, UTI and constipation, slow gastric transit secondary to chronic opioid use, dehydration, electrolyte abnormality, malignancy      ED Course/Provider Notes (Medical Decision Making):     ED Course as of 02/10/22 2142   Thu Feb 10, 2022   232 80year-old female with a history of CAD, asthma, hyperlipidemia, chronic abdominal pain with diverticulitis and hernia repair in the past, malignancy, currently on hospice with chronic pain regimen including fentanyl patch, morphine, and oxycodone presents to the ER via EMS for 2-day history of generalized abdominal pain. Patient states the pain is 10 out of 10. She states she has not had a bowel movement since yesterday and it was small and hard. She denies chest pain, shortness of breath, headache, dizziness, numbness, tingling, falls, dysuria, hematuria, vomiting, diarrhea, fevers, chills, changes in diet, or recent travel. [OM]   2029 CT suggests recurrent/persistent diverticulitis, moderate-large stool burden, possible malignancy. GI follow up is recommended. Results are discussed with patient. She states she is aware of these things, and she is on hospice because she just wants pain treated. Advised her that antibiotic treatment recommended for diverticular disease/PNA/UTI.  [OM]   2030 Yeast: Present [OM]   2030 Bacteria(!): 1+ [OM]   2030 Mucus: 1+ [OM]   2044 Magnesium: 1.8 [OM] 2044 Lipase: 18 [OM]   2044 Sodium: 138 [OM]   2044 Potassium: 3.8 [OM]   2044 Chloride: 103 [OM]   2044 CO2: 26 [OM]   2044 Chloride: 103  Electrolytes are within normal limits. No leukocytosis. Lipase normal.  No CONCEPCIÓN. [OM]   2044 Results discussed again with patient with Rhona Amaya RN, present. Patient verbalized understanding. She wishes to be discharged home. She feels better. She will take antibiotics as prescribed. 10-day course of Augmentin called in. She has Phenergan at home. She will continue her prescribed pain management course. She has a bowel regimen at home, however we will give her a bottle of magnesium citrate to take as needed. Discussed with her how to use this. [OM]      ED Course User Index  [OM] Lindsay Seaman DO           MDM       Procedures   Medical Decision Makingedical Decision Making  Performed by: Franchesca Edwards DO  PROCEDURES:  Procedures       Disposition   Disposition: Condition improved  DC- Adult Discharges: All of the diagnostic tests were reviewed and questions answered. Diagnosis, care plan and treatment options were discussed. The patient understands the instructions and will follow up as directed. The patients results have been reviewed with them. They have been counseled regarding their diagnosis. The patient and family member patient and daughter verbally convey understanding and agreement of the signs, symptoms, diagnosis, treatment and prognosis and additionally agrees to follow up as recommended with their PCP in 24 - 48 hours. They also agree with the care-plan and convey that all of their questions have been answered. I have also put together some discharge instructions for them that include: 1) educational information regarding their diagnosis, 2) how to care for their diagnosis at home, as well a 3) list of reasons why they would want to return to the ED prior to their follow-up appointment, should their condition change.         DISCHARGE PLAN:  1. Current Discharge Medication List      CONTINUE these medications which have NOT CHANGED    Details   ondansetron (Zofran ODT) 4 mg disintegrating tablet 1 Tablet by SubLINGual route every eight (8) hours as needed for Nausea or Vomiting. Qty: 20 Tablet, Refills: 0      amLODIPine (NORVASC) 5 mg tablet Take 1 Tablet by mouth daily. pantoprazole (PROTONIX) 40 mg tablet Take 1 Tablet by mouth daily. promethazine (PHENERGAN) 12.5 mg tablet Take 1 Tablet by mouth every six (6) hours as needed for Nausea. Qty: 60 Tablet, Refills: 1    Associated Diagnoses: Nausea      furosemide (LASIX) 40 mg tablet Take 1 Tablet by mouth daily. Qty: 30 Tablet, Refills: 0      potassium chloride SR (K-TAB) 20 mEq tablet Take 1 Tablet by mouth daily. Qty: 30 Tablet, Refills: 0      famotidine (PEPCID) 20 mg tablet Take 1 Tablet by mouth two (2) times a day. Qty: 60 Tablet, Refills: 2    Associated Diagnoses: Gastroesophageal reflux disease without esophagitis      dicyclomine (BENTYL) 10 mg capsule Take 10 mg by mouth every six (6) hours as needed for Abdominal Cramps. tamsulosin (Flomax) 0.4 mg capsule Take 0.4 mg by mouth daily. albuterol (PROVENTIL HFA, VENTOLIN HFA, PROAIR HFA) 90 mcg/actuation inhaler Take 2 Puffs by inhalation every six (6) hours as needed for Wheezing. Indications: chronic obstructive pulmonary disease  Qty: 1 Inhaler, Refills: 3    Associated Diagnoses: Chronic obstructive pulmonary disease, unspecified COPD type (HCC)      lidocaine (LIDODERM) 5 % Apply patch to the affected area for 12 hours a day and remove for 12 hours a day. Qty: 30 Each, Refills: 2    Associated Diagnoses: Trapezius muscle spasm      Linzess 72 mcg cap capsule Take 1 capsule by mouth once daily for 90 days  Qty: 90 Cap, Refills: 0      arformoteroL (BROVANA) 15 mcg/2 mL nebu neb solution 15 mcg by Nebulization route. cetirizine (ZYRTEC) 5 mg tablet Take 5 mg by mouth daily.       fluticasone propionate (FLONASE ALLERGY RELIEF NA) 1 Spray by Nasal route. levocetirizine (XYZAL) 5 mg tablet Take 5 mg by mouth daily. Oxygen 2 Liters at night      methocarbamoL (ROBAXIN) 500 mg tablet Take 1 Tab by mouth four (4) times daily. Qty: 360 Tab, Refills: 3    Associated Diagnoses: Night muscle spasms      apixaban (ELIQUIS) 2.5 mg tablet Take 2.5 mg by mouth two (2) times a day. simvastatin (ZOCOR) 20 mg tablet Take 20 mg by mouth nightly. cyanocobalamin (VITAMIN B12) 500 mcg tablet Take 500 mcg by mouth daily. Omega-3 Fatty Acids 60- mg cpDR Take  by mouth.      multivitamin (ONE A DAY) tablet Take 1 Tab by mouth daily. carvediloL (COREG) 3.125 mg tablet Take 3.125 mg by mouth two (2) times daily (with meals). 2.   Follow-up Information     Follow up With Specialties Details Why Lei Garcia MD Internal Medicine   [address] [facility] [place] [address]  [phone]      [facility] EMERGENCY DEPT Emergency Medicine  As needed [facility] [id number] [phone]        3. Return to ED if worse   4. Current Discharge Medication List      START taking these medications    Details   amoxicillin-clavulanate (Augmentin) 875-125 mg per tablet Take 1 Tablet by mouth two (2) times a day for 10 days. Qty: 20 Tablet, Refills: 0  Start date: 2/10/2022, End date: 2/20/2022               Diagnosis     Clinical Impression:   1. Abdominal pain, generalized    2. Diverticulitis    3. Acute cystitis with hematuria    4. Constipation, unspecified constipation type        Attestations:    Franchesca Edwards, DO    Please note that this dictation was completed with MT[employer], the [employer] voice recognition software. Quite often unanticipated grammatical, syntax, homophones, and other interpretive errors are inadvertently transcribed by the computer software. Please disregard these errors.   Please excuse any errors that have escaped final proofreading. Thank you.

## 2022-02-11 NOTE — ED NOTES
In to straight cath patient for ua, patient started to void, able to collect urine specimen. Lab called to perform venipuncture.

## 2022-02-11 NOTE — ED TRIAGE NOTES
EMS states she has been complaining of abdominal pain for 2 days. Patient is on hospice for lung cancer per EMS. Her family called hospice nurse and they state to bring her in to evaluation. Patient wears 02 2 L NC when she lays down.

## 2022-02-17 ENCOUNTER — PATIENT OUTREACH (OUTPATIENT)
Dept: CASE MANAGEMENT | Age: 87
End: 2022-02-17

## 2022-02-17 NOTE — PROGRESS NOTES
Per Nurse Regine Dickson progress note dated 2/10/22, Patient is on hospice care. Noted no [facility] hospitalization admission post 30 days from discharge date 10/27/21. This episode is closed and resolved.

## 2022-02-22 NOTE — PROGRESS NOTES
Discussed and recommended.  Patient has declined multiple times in the past.  She continues to decline again today even after repeat detailed discussion, recommendations, early detection, etc..   Pt c/o Nausea; Zofran 4mg IVP given.

## 2022-05-25 NOTE — PROGRESS NOTES
Dear Deanne  Your chest xray was normal without evidence of pneumonia.  Please call or MyChart my office with any questions or concerns.   Vee Tao, PAC         HOSPITALIST PROGRESS NOTE  Christiano Langley MD, [address]         Daily Progress Note: 10/4/2021  C. difficile contact precautions noted. Appropriate PPE donned and doffed. Subjective:     Patient is alert and oriented x3. Tolerating clear liquids for breakfast with slowly improving abdominal pain. Nausea vomiting has now resolved since the a.m. No overnight fever/chills, chest pain, shortness of breath noted. Reassured that we will continue to treat her and monitor for improvement in convalescence over the next 24 to 48 hours for possible discharge.       Medications reviewed  Current Facility-Administered Medications   Medication Dose Route Frequency    therapeutic multivitamin (THERAGRAN) tablet 1 Tablet  1 Tablet Oral DAILY    potassium, sodium phosphates (NEUTRA-PHOS) packet 1 Packet  1 Packet Oral QID    dextrose 5% - 0.45% NaCl with KCl 20 mEq/L infusion  100 mL/hr IntraVENous CONTINUOUS    oxyCODONE-acetaminophen (PERCOCET) 5-325 mg per tablet 1 Tablet  1 Tablet Oral Q6H PRN    sodium chloride (NS) flush 5-40 mL  5-40 mL IntraVENous Q8H    sodium chloride (NS) flush 5-40 mL  5-40 mL IntraVENous PRN    acetaminophen (TYLENOL) tablet 650 mg  650 mg Oral Q6H PRN    Or    acetaminophen (TYLENOL) suppository 650 mg  650 mg Rectal Q6H PRN    polyethylene glycol (MIRALAX) packet 17 g  17 g Oral DAILY PRN    ondansetron (ZOFRAN ODT) tablet 4 mg  4 mg Oral Q8H PRN    Or    ondansetron (ZOFRAN) injection 4 mg  4 mg IntraVENous Q6H PRN    albuterol (PROVENTIL HFA, VENTOLIN HFA, PROAIR HFA) inhaler 2 Puff  2 Puff Inhalation Q6H PRN    apixaban (ELIQUIS) tablet 2.5 mg  2.5 mg Oral BID    [Held by provider] carvediloL (COREG) tablet 3.125 mg  3.125 mg Oral BID WITH MEALS    cyanocobalamin (VITAMIN B12) tablet 500 mcg  500 mcg Oral DAILY    dicyclomine (BENTYL) capsule 10 mg  10 mg Oral Q6H PRN    famotidine (PEPCID) tablet 20 mg  20 mg Oral BID    fluticasone propionate (FLONASE) 50 mcg/actuation nasal spray 2 Spray  2 Spray Both Nostrils DAILY    [Held by provider] furosemide (LASIX) tablet 40 mg  40 mg Oral DAILY    linaCLOtide (LINZESS) caspule 72 mcg- PT MUST BRING FROM HOME (Patient Supplied)  72 mcg Oral ACB    [Held by provider] lisinopriL (PRINIVIL, ZESTRIL) tablet 20 mg  20 mg Oral DAILY    atorvastatin (LIPITOR) tablet 10 mg  10 mg Oral QHS    tamsulosin (FLOMAX) capsule 0.4 mg  0.4 mg Oral DAILY    budesonide-formoteroL (SYMBICORT) 160-4.5 mcg/actuation HFA inhaler 2 Puff  2 Puff Inhalation BID RT    vancomycin (FIRVANQ) 50 mg/mL oral solution 250 mg  250 mg Oral Q6H       Review of Systems:   A comprehensive review of systems was negative except for that written in the HPI. Objective:   Physical Exam:     Visit Vitals  /64   Pulse 82   Temp 97.9 °F (36.6 °C)   Resp 18   Ht 5' 3\" (1.6 m)   Wt 59.9 kg (132 lb)   SpO2 96%   BMI 23.38 kg/m²    O2 Flow Rate (L/min): 2 l/min (Home O2) O2 Device: Nasal cannula (2 lpm)  Patient Vitals for the past 8 hrs:   Temp Pulse Resp BP SpO2   10/04/21 1450     96 %   10/04/21 1230 97.9 °F (36.6 °C) 82 18 124/64 93 %   10/04/21 0800 98.1 °F (36.7 °C) 86 18 135/66 93 %          Temp (24hrs), Av.8 °F (36.6 °C), Min:97.2 °F (36.2 °C), Max:98.3 °F (36.8 °C)    No intake/output data recorded. 10/02 1901 - 10/04 0700  In: 7353 [P.O.:500; I.V.:2990]  Out: -     General:  Alert, cooperative, no distress, appears stated age. Lungs:   Clear to auscultation bilaterally. Chest wall:  No tenderness or deformity. Heart:  Regular rate and rhythm, S1, S2 normal, no murmur, click, rub or gallop. Abdomen:   Soft, non-tender. Bowel sounds normal. No masses,  No organomegaly. Extremities: Extremities normal, atraumatic, no cyanosis or edema. Pulses: 2+ and symmetric all extremities.    Skin: Skin color, texture, turgor normal. No rashes or lesions Neurologic: CNII-XII intact. No gross sensory or motor deficits     Data Review:       Recent Days:  Recent Labs     10/04/21  0345 10/03/21  0359 10/02/21  0450   WBC 6.7 12.8 15.6*   HGB 9.1* 10.0* 10.4*   HCT 29.7* 32.2* 34.0*    321 306     Recent Labs     10/04/21  0345 10/03/21  0359 10/02/21  0900 10/02/21  0450 10/02/21  0449 10/02/21  0449    140 139  --    < > 140   K 3.4 3.5 3.1*  --    < > 3.4   * 111 107  --    < > 109   CO2 24 22 18*  --    < > 16*   GLU 83 103 71  --    < > 50*   BUN 3* 4* 8*  --    < > 8*   CREA 0.40* 0.50* 0.60*  --    < > 0.60*   CA 6.9* 7.2* 7.3*  --    < > 7.4*   MG 1.7 1.7  --   --   --  1.8   PHOS 1.4* 1.1*  --  2.2*  --   --    ALB  --   --   --   --   --  2.6*   TBILI  --   --   --   --   --  1.0   ALT  --   --   --   --   --  12    < > = values in this interval not displayed. Recent Labs     10/02/21  0745   PH 7.41   PCO2 26*   PO2 78*   HCO3 16*   FIO2 29.6       24 Hour Results:  Recent Results (from the past 24 hour(s))   CBC WITH AUTOMATED DIFF    Collection Time: 10/04/21  3:45 AM   Result Value Ref Range    WBC 6.7 4.6 - 13.2 K/uL    RBC 2.93 (L) 4.20 - 5.30 M/uL    HGB 9.1 (L) 12.0 - 16.0 g/dL    HCT 29.7 (L) 35.0 - 45.0 %    .4 (H) 78.0 - 100.0 FL    MCH 31.1 24.0 - 34.0 PG    MCHC 30.6 (L) 31.0 - 37.0 g/dL    RDW 16.0 (H) 11.6 - 14.5 %    PLATELET 416 870 - 635 K/uL    MPV 10.0 9.2 - 11.8 FL    NRBC 0.0 0.0  WBC    ABSOLUTE NRBC 0.00 0.00 - 0.01 K/uL    NEUTROPHILS 67 40 - 73 %    LYMPHOCYTES 22 21 - 52 %    MONOCYTES 8 3 - 10 %    EOSINOPHILS 3 0 - 5 %    BASOPHILS 0 0 - 2 %    IMMATURE GRANULOCYTES 0 0 - 0.5 %    ABS. NEUTROPHILS 4.4 1.8 - 8.0 K/UL    ABS. LYMPHOCYTES 1.4 0.9 - 3.6 K/UL    ABS. MONOCYTES 0.6 0.05 - 1.2 K/UL    ABS. EOSINOPHILS 0.2 0.0 - 0.4 K/UL    ABS. BASOPHILS 0.0 0.0 - 0.1 K/UL    ABS. IMM.  GRANS. 0.0 0.00 - 0.04 K/UL    DF AUTOMATED     MAGNESIUM    Collection Time: 10/04/21  3:45 AM   Result Value Ref Range    Magnesium 1.7 1.7 - 2.8 mg/dL   METABOLIC PANEL, BASIC    Collection Time: 10/04/21  3:45 AM   Result Value Ref Range    Sodium 141 135 - 145 mmol/L    Potassium 3.4 3.2 - 5.1 mmol/L    Chloride 112 (H) 94 - 111 mmol/L    CO2 24 21 - 33 mmol/L    Anion gap 5 mmol/L    Glucose 83 70 - 110 mg/dL    BUN 3 (L) 9 - 21 mg/dL    Creatinine 0.40 (L) 0.70 - 1.20 mg/dL    BUN/Creatinine ratio 8      GFR est AA >60 ml/min/1.73m2    GFR est non-AA >60 ml/min/1.73m2    Calcium 6.9 (L) 8.5 - 10.5 mg/dL   PHOSPHORUS    Collection Time: 10/04/21  3:45 AM   Result Value Ref Range    Phosphorus 1.4 (L) 2.5 - 4.5 mg/dL           Assessment/Plan:     C. diff colitis  Remains uncomfortable, with continued abdominal pain. Continue on po vanco,   Contact isolation  Slowly improving diarrhea and continuing convalescence noted. Leukocytosis has resolved. If patient continues to tolerate oral diet and advance as tolerated then patient may be discharged over the next 24 hours.     Metabolic acidosis  Resolved with IVF.     Hypophosphatemia  Status post p.o. and IV replacement noted.     COPD (chronic obstructive pulmonary disease) ([address]  Continue Brovana and Proventil. Currently compensated.     Dehydration  Poor p.o. intake per daughter. Patient is emaciated.     Diverticulitis- no not suspect active diverticulitis  CT scan tonight showed mild diverticulitis  Discontinued all previous antibiotics. Patient is continuing to convalesce.     Essential hypertension  holding Coreg, Lasix, lisinopril- borderline low bp     Lung mass  Patient and daughter do not want any advanced measures for this lung mass considered malignant     Paroxysmal atrial fibrillation [facility])  Patient on Eliquis  hodling Coreg  Patient has an AICD    DNR/DNI      Care Plan discussed with: Patient/Family, nursing. Total time spent with patient: 35 minutes. With greater than 50% spent in coordination of care and counseling.     Haydee Jewell MD

## 2022-10-22 NOTE — PROGRESS NOTES
Progress Note    Patient: Kalie Walls MRN: [id number]  SSN: [id number]    YOB: 1934  Age: 80 y.o. Sex: female      Admit Date: 9/6/2021    LOS: 6 days     Assessment and Plan:     #1: Colitis with abdominal pain:  -Seen and evaluated by GI, Dr. Gloria Szymanski recommendations to continue oral antibiotics in the next 2 weeks, upon discharge. .  -Leukocytosis improved.  afebrile.   - Repeat ct showed severe inflammation of the sigmoid  - consult GI if persistant pain is present tomorrow. Today its slightly better.     #2: Right-sided lung mass, new finding per CT 9/6/21:  - Highly suspicious for primary lung malignancy with mediastinal and hilar LN involvement (ipsilateral)  -Pulmonology has been consulted. See notes.     #3: Hyperkalemia:   - stop KDur supplements. Recheck tomorrow     #4: Paroxysmal atrial fibrillation:  -Chronic issue, stable.   -Hx pacemaker, AV paced on tele. -continue carvedilol and Eliquis.     #5: Hypertension:  -Chronic issue, normotensive. -BP on the low end of normal, hold amlodipine and lisinopril.     #6: COPD/emphysema:  -Chronic issue, has home O2 at 2 L/min.  -Stable, not in acute exacerbation.       #7: Hyperlipidemia:  -Chronic issue, on statin.     Discussed with nursing. Up to a chair today.     Disposition: The patient has become too debilitated and really needs rehab. At this point the patient is not a safe discharge. Subjective:   Feels better today. Less pain. No bowel movement since yesterday. She has no chest pain palpitations nausea or vomiting. She reports the pain in her lower left quadrant is a little bit better today. She is tolerating a diet. She continues to have shortness of breath with exertion which is her baseline.       Current Facility-Administered Medications   Medication Dose Route Frequency    metroNIDAZOLE (FLAGYL) IVPB premix 500 mg  500 mg IntraVENous Q8H    levoFLOXacin (LEVAQUIN) tablet 750 mg  750 mg Oral Q48H    famotidine (PEPCID) tablet 10 mg  10 mg Oral BID    sodium chloride (NS) flush 5-40 mL  5-40 mL IntraVENous Q8H    sodium chloride (NS) flush 5-40 mL  5-40 mL IntraVENous PRN    acetaminophen (TYLENOL) tablet 650 mg  650 mg Oral Q6H PRN    Or    acetaminophen (TYLENOL) suppository 650 mg  650 mg Rectal Q6H PRN    polyethylene glycol (MIRALAX) packet 17 g  17 g Oral DAILY PRN    ondansetron (ZOFRAN ODT) tablet 4 mg  4 mg Oral Q8H PRN    Or    ondansetron (ZOFRAN) injection 4 mg  4 mg IntraVENous Q6H PRN    melatonin tablet 3 mg  3 mg Oral QHS PRN    traMADoL (ULTRAM) tablet 50 mg  50 mg Oral Q6H PRN    albuterol (PROVENTIL HFA, VENTOLIN HFA, PROAIR HFA) inhaler 2 Puff  2 Puff Inhalation Q6H PRN    [Held by provider] amLODIPine (NORVASC) tablet 5 mg  5 mg Oral DAILY    apixaban (ELIQUIS) tablet 2.5 mg  2.5 mg Oral BID    calcium-vitamin D (OS-MOOK +D3) 250 mg-125 unit per tablet 1 Tablet  1 Tablet Oral DAILY    carvediloL (COREG) tablet 3.125 mg  3.125 mg Oral BID WITH MEALS    fluticasone propionate (FLONASE) 50 mcg/actuation nasal spray 1 Spray  1 Spray Both Nostrils DAILY    furosemide (LASIX) tablet 40 mg  40 mg Oral DAILY    [Held by provider] lisinopriL (PRINIVIL, ZESTRIL) tablet 20 mg  20 mg Oral DAILY    therapeutic multivitamin (THERAGRAN) tablet 1 Tablet  1 Tablet Oral DAILY    tamsulosin (FLOMAX) capsule 0.4 mg  0.4 mg Oral DAILY    atorvastatin (LIPITOR) tablet 20 mg  20 mg Oral QHS        Vitals:    09/11/21 1742 09/11/21 1946 09/11/21 2111 09/11/21 2352   BP: 109/73  127/69 (!) 111/48   Pulse: 81  77 67   Resp:   18 18   Temp:   98 °F (36.7 °C) 97 °F (36.1 °C)   SpO2:  96% 96% 96%   Weight:       Height:         Objective:   General: alert awake well-oriented, no acute distress. HEENT: EOMI, no Icterus, no Pallor,  mucosa moist.  Neck: Neck is supple, No JVD  Lungs: diminished in all fields   CVS: heart sounds normal, regular rate and rhythm, no murmurs, no rubs. GI: softer today. Slight TTP LLQ NO REBOUND. Extremeties: no cyanosis, no edema,   Neuro: Alert, awake, oriented x3, No new focal deficits, moving all extremeties well. Skin: normal skin turgor, no skin rashes. Intake and Output:  Current Shift: No intake/output data recorded. Last three shifts: No intake/output data recorded. Lab/Data Review:  Recent Results (from the past 24 hour(s))   CBC WITH AUTOMATED DIFF    Collection Time: 09/12/21  2:50 AM   Result Value Ref Range    WBC 10.4 4.6 - 13.2 K/uL    RBC 3.52 (L) 4.20 - 5.30 M/uL    HGB 10.6 (L) 12.0 - 16.0 g/dL    HCT 34.7 (L) 35.0 - 45.0 %    MCV 98.6 78.0 - 100.0 FL    MCH 30.1 24.0 - 34.0 PG    MCHC 30.5 (L) 31.0 - 37.0 g/dL    RDW 14.3 11.6 - 14.5 %    PLATELET 166 995 - 807 K/uL    MPV 9.3 9.2 - 11.8 FL    NRBC 0.0 0.0  WBC    ABSOLUTE NRBC 0.00 0.00 - 0.01 K/uL    NEUTROPHILS 62 40 - 73 %    LYMPHOCYTES 24 21 - 52 %    MONOCYTES 10 3 - 10 %    EOSINOPHILS 3 0 - 5 %    BASOPHILS 0 0 - 2 %    IMMATURE GRANULOCYTES 1 (H) 0 - 0.5 %    ABS. NEUTROPHILS 6.4 1.8 - 8.0 K/UL    ABS. LYMPHOCYTES 2.5 0.9 - 3.6 K/UL    ABS. MONOCYTES 1.0 0.05 - 1.2 K/UL    ABS. EOSINOPHILS 0.3 0.0 - 0.4 K/UL    ABS. BASOPHILS 0.0 0.0 - 0.1 K/UL    ABS. IMM. GRANS. 0.1 (H) 0.00 - 0.04 K/UL    DF AUTOMATED     METABOLIC PANEL, COMPREHENSIVE    Collection Time: 09/12/21  2:50 AM   Result Value Ref Range    Sodium 139 135 - 145 mmol/L    Potassium 5.3 (H) 3.2 - 5.1 mmol/L    Chloride 105 94 - 111 mmol/L    CO2 26 21 - 33 mmol/L    Anion gap 8 mmol/L    Glucose 94 70 - 110 mg/dL    BUN 6 (L) 9 - 21 mg/dL    Creatinine 0.70 0.70 - 1.20 mg/dL    BUN/Creatinine ratio 9      GFR est AA >60 ml/min/1.73m2    GFR est non-AA >60 ml/min/1.73m2    Calcium 9.1 8.5 - 10.5 mg/dL    Bilirubin, total 0.6 0.2 - 1.0 mg/dL    AST (SGOT) 24 14 - 74 U/L    ALT (SGPT) 14 3 - 52 U/L    Alk.  phosphatase 57 38 - 126 U/L    Protein, total 6.1 6.1 - 8.4 g/dL    Albumin 2.9 (L) 3.5 - 4.7 g/dL    Globulin 3.2 g/dL    A-G Ratio 0.9            Signed By: Bethany Perrin., MD     September 12, 2021 - DVT ppx: on Eliquis for Afib  - Diet: DASH

## 2023-06-05 NOTE — PROGRESS NOTES
0700- assumed care of pt   0800- full body assessment complete. Pt is complaining of abdominal pain/cramping. No distress noted. No edema noted to BLE. Skin intact. #22 intact to left FA. No distress noted. CBWR  0840- pt is continuing to c/o abd pain rated 8/10, 1 percocet and 1 zofran given orally. 9646- Pt states pain is better but she is still suffering with cramping. Bentyl offered but she declined for now. Pain is rated 3/10 CBWR  1248- Bentyl offered and accepted by patient. 1400- PT iv in left FA infiltrated after starting the potassium phosphate bag, pt is tearful stating \"please I have been stuck enough, they stuck me all night long. My arm is so sore\" multiple bruised sites noted to bilat arms. Unable to reach MD  [phone]- per Miguel NP, if pt does not want to be stuck again IVF can be held, potassium phos can be switched to PO powder. Pt aware and verbalized understanding. Quality 226: Preventive Care And Screening: Tobacco Use: Screening And Cessation Intervention: Patient screened for tobacco use and is an ex/non-smoker Detail Level: Detailed
